# Patient Record
Sex: FEMALE | Race: WHITE | Employment: OTHER | URBAN - METROPOLITAN AREA
[De-identification: names, ages, dates, MRNs, and addresses within clinical notes are randomized per-mention and may not be internally consistent; named-entity substitution may affect disease eponyms.]

---

## 2017-01-10 ENCOUNTER — TELEPHONE (OUTPATIENT)
Dept: RHEUMATOLOGY | Age: 62
End: 2017-01-10

## 2017-01-10 RX ORDER — SODIUM CHLORIDE 9 MG/ML
25 INJECTION, SOLUTION INTRAVENOUS CONTINUOUS
Status: DISPENSED | OUTPATIENT
Start: 2017-01-13 | End: 2017-01-14

## 2017-01-10 RX ORDER — DIPHENHYDRAMINE HYDROCHLORIDE 50 MG/ML
50 INJECTION, SOLUTION INTRAMUSCULAR; INTRAVENOUS
Status: ACTIVE | OUTPATIENT
Start: 2017-01-13 | End: 2017-01-14

## 2017-01-10 RX ORDER — ACETAMINOPHEN 325 MG/1
650 TABLET ORAL
Status: ACTIVE | OUTPATIENT
Start: 2017-01-13 | End: 2017-01-14

## 2017-01-13 ENCOUNTER — HOSPITAL ENCOUNTER (OUTPATIENT)
Dept: INFUSION THERAPY | Age: 62
Discharge: HOME | End: 2017-01-13
Payer: MEDICARE

## 2017-01-17 ENCOUNTER — OFFICE VISIT (OUTPATIENT)
Dept: RHEUMATOLOGY | Age: 62
End: 2017-01-17

## 2017-01-17 VITALS
SYSTOLIC BLOOD PRESSURE: 151 MMHG | OXYGEN SATURATION: 95 % | WEIGHT: 264 LBS | BODY MASS INDEX: 45.07 KG/M2 | HEIGHT: 64 IN | HEART RATE: 99 BPM | DIASTOLIC BLOOD PRESSURE: 104 MMHG | TEMPERATURE: 98.3 F | RESPIRATION RATE: 20 BRPM

## 2017-01-17 DIAGNOSIS — E55.9 VITAMIN D DEFICIENCY: ICD-10-CM

## 2017-01-17 DIAGNOSIS — R42 DIZZINESS: ICD-10-CM

## 2017-01-17 DIAGNOSIS — M05.79 SEROPOSITIVE RHEUMATOID ARTHRITIS OF MULTIPLE SITES (HCC): Primary | ICD-10-CM

## 2017-01-17 DIAGNOSIS — F33.2 SEVERE EPISODE OF RECURRENT MAJOR DEPRESSIVE DISORDER, WITHOUT PSYCHOTIC FEATURES (HCC): ICD-10-CM

## 2017-01-17 DIAGNOSIS — R29.6 FREQUENT FALLS: ICD-10-CM

## 2017-01-17 DIAGNOSIS — Z79.899 LONG TERM CURRENT USE OF IMMUNOSUPPRESSIVE DRUG: ICD-10-CM

## 2017-01-17 DIAGNOSIS — R61 GENERALIZED HYPERHIDROSIS: ICD-10-CM

## 2017-01-17 DIAGNOSIS — E03.9 ACQUIRED HYPOTHYROIDISM: ICD-10-CM

## 2017-01-17 DIAGNOSIS — Z79.52 LONG TERM (CURRENT) USE OF SYSTEMIC STEROIDS: ICD-10-CM

## 2017-01-17 DIAGNOSIS — E78.00 HYPERCHOLESTEROLEMIA: ICD-10-CM

## 2017-01-17 DIAGNOSIS — S81.811S: ICD-10-CM

## 2017-01-17 DIAGNOSIS — E66.01 MORBID OBESITY WITH BMI OF 40.0-44.9, ADULT (HCC): ICD-10-CM

## 2017-01-17 DIAGNOSIS — M85.80 OSTEOPENIA: ICD-10-CM

## 2017-01-17 RX ORDER — METHOTREXATE 2.5 MG/1
20 TABLET ORAL
Qty: 96 TAB | Refills: 0 | Status: SHIPPED | OUTPATIENT
Start: 2017-01-21 | End: 2017-03-06 | Stop reason: SDUPTHER

## 2017-01-17 RX ORDER — FOLIC ACID 1 MG/1
1 TABLET ORAL DAILY
Qty: 90 TAB | Refills: 0 | Status: SHIPPED | OUTPATIENT
Start: 2017-01-17 | End: 2017-04-17

## 2017-01-17 NOTE — MR AVS SNAPSHOT
Visit Information Date & Time Provider Department Dept. Phone Encounter #  
 1/17/2017  1:40 PM Saige Yadav MD [facility] [id number] Upcoming Health Maintenance Date Due  
 FOOT EXAM Q1 12/29/1963 MICROALBUMIN Q1 12/29/1963 EYE EXAM RETINAL OR DILATED Q1 12/29/1963 Pneumococcal 19-64 Medium Risk (1 of 1 - PPSV23) 12/29/1972 DTaP/Tdap/Td series (1 - Tdap) 12/29/1974 FOBT Q 1 YEAR AGE 50-75 12/29/2003 BREAST CANCER SCRN MAMMOGRAM 8/19/2016 HEMOGLOBIN A1C Q6M 4/13/2017 LIPID PANEL Q1 10/13/2017 PAP AKA CERVICAL CYTOLOGY 8/14/2020 Allergies as of 1/17/2017  Review Complete On: 1/17/2017 By: Argentina Parham LPN No Known Allergies Current Immunizations  Reviewed on 12/16/2016 Name Date Influenza Vaccine 9/6/2016 Influenza Vaccine Vesna Miu) 11/19/2015  2:49 PM  
  
 Not reviewed this visit You Were Diagnosed With   
  
 Codes Comments Seropositive rheumatoid arthritis of multiple sites [facility])    -  Primary ICD-10-CM: M05.79 ICD-9-CM: 714.0 Long term (current) use of systemic steroids     ICD-10-CM: Z79.52 
ICD-9-CM: V58.65 Long term current use of immunosuppressive drug     ICD-10-CM: Z79.899 ICD-9-CM: V58.69 Hypercholesterolemia     ICD-10-CM: E78.00 ICD-9-CM: 272.0 Generalized hyperhidrosis     ICD-10-CM: R61 
ICD-9-CM: 780.8 Acquired hypothyroidism     ICD-10-CM: E03.9 ICD-9-CM: 244.9 Morbid obesity with BMI of 40.0-44.9, adult (HCC)     ICD-10-CM: E66.01, Z68.41 
ICD-9-CM: 278.01, V85.41 Osteopenia     ICD-10-CM: M85.80 ICD-9-CM: 733.90 Vitamin D deficiency     ICD-10-CM: E55.9 ICD-9-CM: 268.9 Laceration of leg, right, sequela     ICD-10-CM: S81.811S 
ICD-9-CM: 906.1 Severe episode of recurrent major depressive disorder, without psychotic features ([facility]     ICD-10-CM: F33.2 ICD-9-CM: 296.33 Dizziness     ICD-10-CM: Q66 ICD-9-CM: 780.4 Frequent falls     ICD-10-CM: R29.6 ICD-9-CM: V15.88 Vitals BP Pulse Temp Resp Height(growth percentile) Weight(growth percentile) (!) 151/104 (BP 1 Location: Left arm, BP Patient Position: Sitting) 99 98.3 °F (36.8 °C) (Oral) 20 5' 4\" (1.626 m) 264 lb (119.7 kg) SpO2 BMI OB Status Smoking Status 95% 45.32 kg/m2 Postmenopausal Former Smoker Vitals History BMI and BSA Data Body Mass Index Body Surface Area  
 45.32 kg/m 2 2.32 m 2 Preferred Pharmacy Pharmacy Name Phone [address] [phone] Your Updated Medication List  
  
   
This list is accurate as of: 1/17/17  2:10 PM.  Always use your most recent med list.  
  
  
  
  
 alendronate 70 mg tablet Commonly known as:  FOSAMAX Take 70 mg by mouth every seven (7) days. atorvastatin 40 mg tablet Commonly known as:  LIPITOR  
TAKE 1 TABLET BY MOUTH DAILY  
  
 buPROPion  mg XL tablet Commonly known as:  WELLBUTRIN XL  
TAKE 1 TABLET BY MOUTH EVERY MORNING  
  
 CALCIUM 600 + D 600-125 mg-unit Tab Generic drug:  calcium-cholecalciferol (d3) Take  by mouth daily. carvedilol 12.5 mg tablet Commonly known as:  Victoria Cerratoas Take 1 Tab by mouth two (2) times daily (with meals). Indications: Hypertension DULoxetine 40 mg Cpdr  
TK 1 C PO QD  
  
 folic acid 1 mg tablet Commonly known as:  Google Take 1 Tab by mouth daily for 90 days. gabapentin 600 mg tablet Commonly known as:  NEURONTIN Take  by mouth three (3) times daily. Not taking  
  
 levothyroxine 100 mcg tablet Commonly known as:  SYNTHROID  
TAKE 1 TABLET BY MOUTH DAILY  
  
 methotrexate 2.5 mg tablet Commonly known as:  Terrell Camilo Take 8 Tabs by mouth Every Saturday for 90 days. Start taking on:  1/21/2017  
  
 predniSONE 5 mg tablet Commonly known as:  Elly Ag  
 Take 5 tabs ( 25 mg) daily PROAIR HFA 90 mcg/actuation inhaler Generic drug:  albuterol Take 2 Puffs by inhalation every four (4) hours as needed. TOCILIZUMAB IV  
by IntraVENous route. Not taking VITAMIN D2 50,000 unit capsule Generic drug:  ergocalciferol Take 50,000 Units by mouth every seven (7) days. For 8 weeks Prescriptions Sent to Pharmacy Refills  
 methotrexate (RHEUMATREX) 2.5 mg tablet 0 Starting on: 1/21/2017 Sig: Take 8 Tabs by mouth Every Saturday for 90 days. Class: Normal  
 Pharmacy: [facility] [address] [facility] [email or web address] Ph #: [phone] Route: Oral  
 folic acid (FOLVITE) 1 mg tablet 0 Sig: Take 1 Tab by mouth daily for 90 days. Class: Normal  
 Pharmacy: [facility] [address] [facility] [email or web address] Ph #: [phone] Route: Oral  
  
We Performed the Following REFERRAL TO ENT-OTOLARYNGOLOGY [MON44 Custom] REFERRAL TO PHYSICAL THERAPY [ZOR63 Custom] Comments:  
 Evaluate and treat for frequent falls To-Do List   
 02/10/2017 1:00 PM  
  Appointment with [address] [place] [address] ([phone])  
  
 03/10/2017 1:00 PM  
  Appointment with [address] [place] [address] ([phone]) Referral Information Referral ID Referred By Referred To  
  
 [id number] [facility] [address] [place] [address] Mansoor Canas Visits Status Start Date End Date 1 New Request 1/17/17 1/17/18 If your referral has a status of pending review or denied, additional information will be sent to support the outcome of this decision. Referral ID Referred By Referred To  
 [id number] Job Jeronimo Not Available Visits Status Start Date End Date 1 New Request 1/17/17 1/17/18 If your referral has a status of pending review or denied, additional information will be sent to support the outcome of this decision. Patient Instructions Please discuss with your PCP about lacerations, frequent falls Please discuss with your therapist about your depression Please call the infusion center to schedule your Actemra infusion ([phone]) I refilled your methotrexate I gave you a referral to PT and ENT. Decrease your prednisone to 15 mg daily Introducing [facility] SERVICES! Dear Avi Rodriguez: Thank you for requesting a [employer] account. Our records indicate that you have previously registered for a [employer] account but its currently inactive. Please call our P[employer] support line at [phone]. Additional Information If you have questions, please visit the Frequently Asked Questions section of the [employer] website at [email or web address] Remember, [employer] is NOT to be used for urgent needs. For medical emergencies, dial 911. Now available from your iPhone and Android! Please provide this summary of care documentation to your next provider. Your primary care clinician is listed as [address]. If you have any questions after today's visit, please call [phone].

## 2017-01-17 NOTE — PROGRESS NOTES
Patient has fell 3 times since January 1 ,2017. Fell 2 days ago, and hurt right knee. Patient has skin tear on right knee, and bruising down right calf.

## 2017-01-17 NOTE — PATIENT INSTRUCTIONS
Please discuss with your PCP about lacerations, frequent falls     Please discuss with your therapist about your depression    Please call the infusion center to schedule your Actemra infusion ([phone])    I refilled your methotrexate    I gave you a referral to PT and ENT.     Decrease your prednisone to 15 mg daily

## 2017-01-17 NOTE — PROGRESS NOTES
REASON FOR VISIT    This is a follow-up visit for Ms. Johnson for Seropositive Rheumatoid Arthritis.     Inflammatory arthritis phenotype includes:  Anti-CCP positive: yes (73)  Rheumatoid factor positive: yes (86.2)  Erosive disease: no  Extra-articular manifestations include: none    Quantiferon TB: negative  PPD:  Not performed    Therapy History includes:    Current DMARD therapy includes: methotrexate 20 mg every Saturday, Actemra infusion (12/16/2016)   Prior DMARD therapy includes: methotrexate, Arava, Enbrel, Humira, Orencia  The following DMARDs have been ineffective: methotrexate (6 months), Onencia SQ (4 months)  The following DMARDs were stopped because of side effects: Arava (hepatotoxicity), Humira (elbow infection from MRSA s/p 3 surgeries)     Osteoporosis Historical Synopsis     Height loss since age 25 (at least two inches): 2.5  Fracture history includes: no  Family history of hip fracture: no  Fall Risk: yes     Daily calcium intake is 1,000 mg  Daily vitamin D intake is 50,000 per week     Smoking history: yes (former smoker of 22 years)  Alcohol consumption: no  Prednisone history: yes (25 mg for more than several years)     Exercise: yes     Previous work-up for osteoporosis includes the following:  DEXA Scan: 2015  Vitamin 25OH D level: None  Calcium level: 9.8 mg/dL  PTH: None     Therapy History includes:     Current osteoporosis therapy includes: alendronate 70 mg weekly  Prior osteoporosis therapy includes: none  The following osteoporosis have been ineffective: none  The following osteoporosis were stopped because of side effects: none    Immunizations:   Immunization History   Administered Date(s) Administered    Influenza Vaccine 09/06/2016    Influenza Vaccine (Quad) 11/19/2015       Active problems include:    Patient Active Problem List   Diagnosis Code    Arthritis of knee M19.90    Lumbar stenosis with neurogenic claudication M48.06    Seropositive rheumatoid arthritis of multiple sites ([facility] M05.79    Easy bruising R23.8    Acquired hypothyroidism E03.9    Postmenopausal depression F32.89    Hypercholesterolemia E78.00    Generalized hyperhidrosis R61    Prediabetes R73.03    Long term (current) use of systemic steroids Z79.52    Long term current use of immunosuppressive drug Z79.899    Essential hypertension I10    Controlled type 2 diabetes mellitus with complication, without long-term current use of insulin ([facility]) E11.8    Morbid obesity with BMI of 40.0-44.9, adult ([facility]) E66.01, Z68.41    Vitamin D deficiency E55.9    Osteopenia M85.80    Severe episode of recurrent major depressive disorder, without psychotic features ([facility]) F33.2       HISTORY OF PRESENT ILLNESS    Ms. Avani Garcia returns for a follow-up visit. On her last visit, I increased her methotrexate to 20 mg weeklySaturday with good tolerance and started her on Actemra infusions and reduced her prednisone to 20 mg. She has received 3 Actemra infusions with good tolerance to date. She has had vertigo for two months and then had bronchitis and was treated by antibiotics by urgent, so has missed her methotrexate dose for 3-4 weeks. She has fallen three times since January and has injured her legs and thumb. She injured her right knee and has a large open wound. She did not seek medical care because she is very depressed and does not know why. She has begun seeing a therapist and NP psychiatrist.     Last toxicity monitoring by blood work was done on 12/16/2016and did not reveal any significant adverse effects. Most recent inflammatory markers from 10/13/2016 revealed a ESR 27 mm/hr and CRP 3.8 mg/L. The patient has not had any interval hospital admissions or surgeries but has had infections. Iliana Gu REVIEW OF SYSTEMS    A comprehensive review of systems was performed and pertinent results are documented in the HPI, review of systems is otherwise non-contributory.     PAST MEDICAL HISTORY    She has a past medical history of Anxiety; Arrhythmia; Arthritis; Arthritis of knee (1/30/2012); Autoimmune disease ([facility] Bronchitis; Chronic pain; Depression; Dyslipidemia; Morbid obesity ([address] Nausea & vomiting; Other screening mammogram (8/19/15); Overweight(278.02); Papanicolaou smear for cervical cancer screening (8/14/15); Rheumatoid aortitis ([facility] and SVT (supraventricular tachycardia) (1993). FAMILY HISTORY    Her family history includes Breast Cancer in an other family member; Diabetes in her brother and mother; Heart Disease in her brother and father. SOCIAL HISTORY    She reports that she quit smoking about 4 years ago. She has a 10.00 pack-year smoking history. She has never used smokeless tobacco. She reports that she does not drink alcohol or use illicit drugs. MEDICATIONS    Current Outpatient Prescriptions   Medication Sig Dispense Refill    [START ON 1/21/2017] methotrexate (RHEUMATREX) 2.5 mg tablet Take 8 Tabs by mouth Every Saturday for 90 days. 96 Tab 0    folic acid (FOLVITE) 1 mg tablet Take 1 Tab by mouth daily for 90 days. 90 Tab 0    buPROPion XL (WELLBUTRIN XL) 300 mg XL tablet TAKE 1 TABLET BY MOUTH EVERY MORNING 30 Tab 3    DULoxetine 40 mg cpDR TK 1 C PO QD  1    atorvastatin (LIPITOR) 40 mg tablet TAKE 1 TABLET BY MOUTH DAILY 30 Tab 2    carvedilol (COREG) 12.5 mg tablet Take 1 Tab by mouth two (2) times daily (with meals). Indications: Hypertension 60 Tab 2    levothyroxine (SYNTHROID) 100 mcg tablet TAKE 1 TABLET BY MOUTH DAILY 30 Tab 5    predniSONE (DELTASONE) 5 mg tablet Take 5 tabs ( 25 mg) daily (Patient taking differently: Take 4 tabs ( 20 mg) daily) 140 Tab 2    PROAIR HFA 90 mcg/actuation inhaler Take 2 Puffs by inhalation every four (4) hours as needed. 2    alendronate (FOSAMAX) 70 mg tablet Take 70 mg by mouth every seven (7) days. 3    VITAMIN D2 50,000 unit capsule Take 50,000 Units by mouth every seven (7) days.  For 8 weeks  2    calcium-cholecalciferol, d3, (CALCIUM 600 + D) 600-125 mg-unit tab Take  by mouth daily.  TOCILIZUMAB IV by IntraVENous route. Not taking      gabapentin (NEURONTIN) 600 mg tablet Take  by mouth three (3) times daily. Not taking          ALLERGIES    No Known Allergies    PHYSICAL EXAMINATION    Visit Vitals    BP (!) 151/104 (BP 1 Location: Left arm, BP Patient Position: Sitting)    Pulse 99    Temp 98.3 °F (36.8 °C) (Oral)    Resp 20    Ht 5' 4\" (1.626 m)    Wt 264 lb (119.7 kg)    SpO2 95%    BMI 45.32 kg/m2     Body mass index is 45.32 kg/(m^2). General: Patient is alert, oriented x 3, not in acute distress, emotional lability    HEENT:   Sclerae are not injected and appear moist.  Oral mucous membranes are moist, there are no ulcers present. There is no alopecia. Neck is supple, there is no lymphadenopathy. Thyroid is not enlarged. Cardiovascular:  Heart is regular rate and rhythm, no murmurs. Chest:  Lungs are clear to auscultation bilaterally. No rhonchi, wheezes, or crackles. Abdomen:  Obese. Extremities:  Free of clubbing, cyanosis, edema    Neurological exam:  No focal sensory deficits, muscle strength is full in upper and lower extremities     Skin exam:  There are no alopecia, no discoid lesions, no active Raynaud's, no livedo reticularis, no periungual erythema. Ecchymosis on lower extremities, right more than left. Large right knee laceration with flap. Musculoskeletal exam:  A comprehensive musculoskeletal exam was performed for all joints of each upper and lower extremity and assessed for swelling, tenderness and range of motion.  Positive results are documented as below:     Bilateral CMC crepitus  Bilateral wrist synovitis (right more than left)  Left Elbow flexion contracture  Decreased of ROM of wrists  Right torn biceps  Bilateral MTP synovitis    Joint Count 1/17/2017 11/3/2016 10/13/2016   Patient pain (0-100) 35 80 85   MHAQ 1.5 1.38 1.63   Left elbow - Tender - - 1   Left elbow - Swollen - - 1   Left wrist- Tender - - 1   Left wrist- Swollen - - 1   Left 1st MCP - Tender - - 1   Left 1st MCP - Swollen - - 1   Left 2nd MCP - Tender - - 1   Left 2nd MCP - Swollen - - 1   Left 3rd MCP - Tender - - 1   Left 3rd MCP - Swollen - - 1   Left 4th MCP - Tender - - 1   Left 4th MCP - Swollen - - 1   Left 5th MCP - Tender - - 1   Left 5th MCP - Swollen - - 1   Left thumb IP - Tender - - 1   Left thumb IP - Swollen - - 1   Left 2nd PIP - Tender - - 1   Left 2nd PIP - Swollen - - 1   Left 3rd PIP - Tender - - 1   Left 3rd PIP - Swollen - - 1   Left 4th PIP - Tender - - 1   Left 5th PIP - Tender - - 1   Right elbow - Tender - - 1   Right elbow - Swollen - - 1   Right wrist- Tender - - 1   Right wrist- Swollen - - 1   Right 1st MCP - Tender - - 1   Right 1st MCP - Swollen - - 1   Right 2nd MCP - Tender - - 1   Right 2nd MCP - Swollen - - 1   Right 3rd MCP - Tender - - 1   Right 3rd MCP - Swollen - - 1   Right 4th MCP - Tender - - 1   Right 4th MCP - Swollen - - 1   Right 5th MCP - Tender - - 1   Right 5th MCP - Swollen - - 1   Right thumb IP - Tender - - 1   Right thumb IP - Swollen - - 1   Right 2nd PIP - Tender - - 1   Right 2nd PIP - Swollen - - 1   Right 3rd PIP - Tender - - 1   Right 3rd PIP - Swollen - - 1   Right 4th PIP - Tender - - 1   Right 4th PIP - Swollen - - 1   Right 5th PIP - Tender - - 1   Right 5th PIP - Swollen - - 1   Physician Assessment (0-10) - - 9   Patient Assessment (0-10) 6 7 9       DATA REVIEW    Laboratory     The following laboratory results were reviewed and discussed with the patient:    Hospital Outpatient Visit on 12/16/2016   Component Date Value    WBC 12/16/2016 10.0     RBC 12/16/2016 4.58     HGB 12/16/2016 14.6     HCT 12/16/2016 44.3     MCV 12/16/2016 96.7     MCH 12/16/2016 31.9     MCHC 12/16/2016 33.0     RDW 12/16/2016 15.0*    PLATELET 57/20/8094 606     NEUTROPHILS 12/16/2016 83*    LYMPHOCYTES 12/16/2016 13     MONOCYTES 12/16/2016 3*    EOSINOPHILS 12/16/2016 0     BASOPHILS 12/16/2016 0     MYELOCYTES 12/16/2016 1*    ABS. NEUTROPHILS 12/16/2016 8.3*    ABS. LYMPHOCYTES 12/16/2016 1.3     ABS. MONOCYTES 12/16/2016 0.3     ABS. EOSINOPHILS 12/16/2016 0.0     ABS. BASOPHILS 12/16/2016 0.0     RBC COMMENTS 12/16/2016                      Value:ANISOCYTOSIS  1+      WBC COMMENTS 12/16/2016 REACTIVE LYMPHS     Sodium 12/16/2016 140     Potassium 12/16/2016 4.5     Chloride 12/16/2016 103     CO2 12/16/2016 26     Anion gap 12/16/2016 11     Glucose 12/16/2016 203*    BUN 12/16/2016 22*    Creatinine 12/16/2016 0.95     BUN/Creatinine ratio 12/16/2016 23*    GFR est AA 12/16/2016 >60     GFR est non-AA 12/16/2016 60*    Calcium 12/16/2016 8.3*    Bilirubin, total 12/16/2016 0.3     ALT 12/16/2016 52     AST 12/16/2016 24     Alk. phosphatase 12/16/2016 109     Protein, total 12/16/2016 7.2     Albumin 12/16/2016 3.6     Globulin 12/16/2016 3.6     A-G Ratio 12/16/2016 1.0*   Office Visit on 11/09/2016   Component Date Value    Glucose POC 11/09/2016 198    Office Visit on 10/13/2016   Component Date Value    CCP Antibodies IgG/IgA 10/13/2016 73*    WBC 10/13/2016 10.3     RBC 10/13/2016 4.57     HGB 10/13/2016 14.1     HCT 10/13/2016 44.0     MCV 10/13/2016 96     MCH 10/13/2016 30.9     MCHC 10/13/2016 32.0     RDW 10/13/2016 14.7     PLATELET 04/39/8998 669     NEUTROPHILS 10/13/2016 82     Lymphocytes 10/13/2016 11     MONOCYTES 10/13/2016 4     EOSINOPHILS 10/13/2016 1     BASOPHILS 10/13/2016 1     ABS. NEUTROPHILS 10/13/2016 8.5*    Abs Lymphocytes 10/13/2016 1.2     ABS. MONOCYTES 10/13/2016 0.4     ABS. EOSINOPHILS 10/13/2016 0.1     ABS. BASOPHILS 10/13/2016 0.1     IMMATURE GRANULOCYTES 10/13/2016 1     ABS. IMM. GRANS.  10/13/2016 0.1     Hep B surface Ag screen 10/13/2016 Negative     Hepatitis Be Antigen 10/13/2016 Negative     Hep B Core Ab, IgM 10/13/2016 Negative     Hep B Core Ab, total 10/13/2016 Negative     Hepatitis Be Antibody 10/13/2016 Negative     HEP B SURFACE AB, QUAL 10/13/2016 Non Reactive     HCV Ab 10/13/2016 <0.1     Glucose 10/13/2016 171*    BUN 10/13/2016 16     Creatinine 10/13/2016 0.75     GFR est non-AA 10/13/2016 86     GFR est AA 10/13/2016 99     BUN/Creatinine ratio 10/13/2016 21     Sodium 10/13/2016 139     Potassium 10/13/2016 4.9     Chloride 10/13/2016 99     CO2 10/13/2016 23     Calcium 10/13/2016 9.2     Protein, total 10/13/2016 6.7     Albumin 10/13/2016 4.2     GLOBULIN, TOTAL 10/13/2016 2.5     A-G Ratio 10/13/2016 1.7     Bilirubin, total 10/13/2016 0.3     Alk. phosphatase 10/13/2016 104     AST 10/13/2016 29     ALT 10/13/2016 39*    C-Reactive Protein, Qt 10/13/2016 3.8     Sed rate (ESR) 10/13/2016 27     QuantiFERON Incubation 10/13/2016                      Value:Incubated, specimen forwarded to [facility], [place] for  completion of the assay.       Rheumatoid factor 10/13/2016 86.2*    Albumin 10/13/2016 3.8     Alpha-1-globulin 10/13/2016 0.2     Alpha-2-globulin 10/13/2016 0.7     Beta globulin 10/13/2016 1.4*    Gamma globulin 10/13/2016 0.7     M-spike 10/13/2016 Not Observed     Globulin, total 10/13/2016 2.9     A/G ratio 10/13/2016 1.3     Please note 10/13/2016 Comment     Interpretation (see belo* 10/13/2016 Comment     Specific Gravity 10/13/2016      >=1.030*    pH (UA) 10/13/2016 5.5     Color 10/13/2016 Yellow     Appearance 10/13/2016 Clear     Leukocyte Esterase 10/13/2016 Negative     Protein 10/13/2016 Negative     Glucose 10/13/2016 3+*    Ketone 10/13/2016 Negative     Blood 10/13/2016 Negative     Bilirubin 10/13/2016 Negative     Urobilinogen 10/13/2016 0.2     Nitrites 10/13/2016 Negative     Microscopic Examination 10/13/2016 Comment     Microscopic exam 10/13/2016 See additional order     URINALYSIS REFLEX 10/13/2016 Comment     Uric acid 10/13/2016 4.0     Creatinine, urine 10/13/2016 129.8     Protein total, urine 10/13/2016 10.8     Protein/Creat Ratio 10/13/2016 83     VITAMIN D, 25-HYDROXY 10/13/2016 24.1*    Antinuclear Abs, IFA 10/13/2016 Negative     Sjogren's Anti-SS-A 10/13/2016 <0.2     Sjogren's Anti-SS-B 10/13/2016 <0.2     PTH, Intact 10/13/2016 38     TSH 10/13/2016 0.884     Cholesterol, total 10/13/2016 215*    Triglyceride 10/13/2016 138     HDL Cholesterol 10/13/2016 81     VLDL, calculated 10/13/2016 28     LDL, calculated 10/13/2016 106*    Hemoglobin A1c 10/13/2016 7.0*    WBC 10/13/2016 0-5     RBC 10/13/2016 0-2     Epithelial cells 10/13/2016 0-10     Casts 10/13/2016 None seen     Mucus 10/13/2016 Present     Bacteria 10/13/2016 None seen     Comment 10/13/2016 Comment     QuantiFERON TB Gold 10/13/2016 Negative     QUANTIFERON CRITERIA 10/13/2016 Comment     QuantiFERON TB Ag Value 10/13/2016 0.04     QuantiFERON Nil Value 10/13/2016 0.06     QuantiFERON Mitogen Value 10/13/2016 7.89     QFT TB Ag minus Nil Value 10/13/2016 <0.00     Interpretation: 10/13/2016 Comment        Imaging    Musculoskeletal Ultrasound    None    Radiographs    Left Elbow 10/13/2016: mild to moderate diffuse joint space loss with moderate-sized marginal osteophytes. An elbow effusion is evident. Bones are moderately osteopenic. No acute fracture or dislocation is shown. Bilateral Hand 10/13/2016: moderate diffuse osteopenia. Bilateral carpal soft tissue swelling is shown which is greater on the right. Severe joint space loss is demonstrated in the radiocarpal joints bilaterally with scapholunate advanced collapse of the right. Right capitate abuts the distal radius. Incongruence on the left is shown between the scaphoid and lunate with mild interspace widening indicating early scapholunate advanced collapse. Small right bilateral distal radioulnar joint osteophytes are shown. A 2 to 3 mm loose body is shown on the right.  There is moderate bilateral first CMC osteoarthrosis with lateral subluxation and tiny marginal osteophytes. Mild to moderate uniform joint space narrowing is shown throughout the metacarpophalangeal joints with only minimal bilateral second and left third MCP joint osteophyte formation. Mild MCP level bilateral soft tissue swelling is present. Digital assessment shows mild joint space narrowing of the right second and third and the left third DIP joints. Tiny osteophytes are shown at these articulations as well as a small periosteal calcification along the medial aspect of the right ring finger middle phalangeal head. No definite erosions are identified nor is there soft tissue calcifications indicative of calcium pyrophosphate dihydrate deposition. Bilateral Foot 10/13/2016: chronic appearing fracture at the base of the right fifth metatarsal bone with fracture gap widening measuring up to 6 mm and mild adjacent soft tissue swelling. There is no other evidence for acute or chronic fracture or dislocation. Mild bilateral first metatarsophalangeal joint osteoarthrosis is demonstrated with tiny marginal osteophytes. Mild osseous hypertrophy of the medial first metatarsal heads is shown bilaterally with nonmarginal erosions with sclerotic margins and mild overlying soft tissue swelling. There is also mild valgus at posterior first MTP joints would lateral subluxation of the hallux sesamoid bones. Soft tissue swelling also overlies the fifth metatarsal heads bilaterally. On the right, there are nonmarginal erosions measuring up to 6 mm in size, also sharply demarcated with sclerotic margins. Mild joint space loss of the left fifth metatarsophalangeal joint is demonstrated with tiny marginal osteophytes. Several discrete nonmarginal erosions are shown medially and laterally in the third metatarsal head with mild MTP joint space loss on the right.  First through third tarsometatarsal joint space narrowing is present bilaterally with subcortical demineralization at the right third metatarsal base. Mild overlying dorsal soft tissue swelling is shown. The patient is status post resection of the left second metatarsal head. Extensive atherosclerotic calcifications are shown. Pelvic 1/07/2016: bilateral hip prostheses are partially visualized and in satisfactory alignment. There is no acute fracture. Lumbosacral fusion hardware is intact. 1/07/2016:    Lumbar 7/11/2014: recent laminectomy and instrumentation from , with placement of rods and pedicle screws. The hardware appears intact and hardware position is appropriate. Degenerative disc disease is present from L2-S1. There is grade 1 spondylolisthesis at L4-5 which is probably chronic. There is no evidence of vertebral compression fracture. CT Imaging    CTA chest with and without contrast 7/10/2014: no mediastinal, axillary or hilar lymphadenopathy. There is no pleural or pericardial effusion. The aorta is normal in course and caliber. The proximal pulmonary arteries are without evidence of filling defects, the caliber of the pulmonary arteries is also normal. The pulmonary parenchyma is unremarkable. No lytic or blastic lesions are identified. Subcutaneous edema in the soft tissues. Hepatic steatosis. No aortic aneurysm. No pulmonary embolism. The remainder of the upper abdomen visualized is unremarkable    MR Imaging    None    DXA     DXA 6/25/2015: (Excluded sites: prosthetic hips, L3 and L4 for hardware and both hips for hardware) lumbar spine L1-L2 T score -0.2 (BMD 1.150 g/cm2). ASSESSMENT AND PLAN    This is a follow-up visit for Ms. Johnson. 1) Seropositive Rheumatoid Arthritis. She was tolerating methotrexate 20 mg every Saturday but held it for about 3-4 doses due to URI. She is better now. . She missed her most recent Actemra infusion due missing her date.  She is prednisone 20 mg as the beginning on her taper I asked her decrease her prednisone to 15 mg daily and I refilled her methotrexate today. She should call and resume her Actemra infusions. 2) Long Term Use of Systemic Steroids. She is on 15 mg. I am weaning her down slowly. 3) Fibromyalgia. This was not an active issue today. 4) Long Term Use of Immunosuppressants. The patient remains on immunomodulatory medications (methotrexate, Actemra) and requires frequent toxicity monitoring by blood work.     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 She is on alendronate 70 mg weekly with good tolerance. She takes 1000 mg of calcium daily and ergocalciferol.     6) Morbid Obesity. Her BMI was 45.32  (previously 43.69, 44.46). She is actively trying to lose weight. She was been more physically active and was doing Aqua Therapy, but with her depression she has stopped.    7) Hypertension. Her blood pressure was 151/104 (previously 160/112). She is on Coreg 12.5 mg twice daily. I recommend increasing to 25 mg twice daily.    8) Vitamin D Deficiency. Her vitamin D was 24.1. She is on ergocalciferol 50,000 weekly.    9) Hypercholesterolemia. Her cholesterol was 215. She is onLipitor 80 mg. .      10) Constant Diaphoresis. Normal TSH. Not hormonal problem as per patient. This could from medications. 11) Major Depression. This is not well controlled. She follow with a therapist and an NP psychiatrist. This is not controlled. I asked her to follow up with them. 12) Vertigo with Frequent Falls. I gave her a referral to ENT, Dr. Omayra Harry, and physical therapy. 13) Right Knee Laceration. I asked her to follow up with her PCP. I personally spoke with her PCP's office and scheduled her with Javed Webb NP at 2:00pm.    15) Diabetes Mellitus Type II. Her HbA1c was 6.6%. The patient voiced understanding of the aforementioned assessment and plan. Summary of plan was provided in the After Visit Summary patient instructions.      TODAY'S ORDERS    Orders Placed This Encounter    REFERRAL TO ENT-OTOLARYNGOLOGY  REFERRAL TO PHYSICAL THERAPY    methotrexate (RHEUMATREX) 2.5 mg tablet    folic acid (FOLVITE) 1 mg tablet       Future Appointments  Date Time Provider Kenn Daphnie   1/18/2017 2:00 PM SHANIA Weir   2/10/2017 1:00 PM [place] INFUSION NURSE 2 [facility]   2/14/2017 1:40 PM Mart Hernandez MD [facility]   3/10/2017 1:00  [place] 2 [facility] Sabrina Gruber MD, [address] [facility]    Adult Rheumatology   Musculoskeletal Ultrasound Certified  [address]   [address] [facility], [address] [place] [address] Road   Phone [phone]  Fax [phone]

## 2017-01-18 ENCOUNTER — OFFICE VISIT (OUTPATIENT)
Dept: INTERNAL MEDICINE CLINIC | Age: 62
End: 2017-01-18

## 2017-01-18 VITALS
HEIGHT: 64 IN | HEART RATE: 88 BPM | WEIGHT: 263 LBS | OXYGEN SATURATION: 98 % | SYSTOLIC BLOOD PRESSURE: 134 MMHG | DIASTOLIC BLOOD PRESSURE: 94 MMHG | BODY MASS INDEX: 44.9 KG/M2 | TEMPERATURE: 97.6 F | RESPIRATION RATE: 16 BRPM

## 2017-01-18 DIAGNOSIS — M05.79 SEROPOSITIVE RHEUMATOID ARTHRITIS OF MULTIPLE SITES (HCC): ICD-10-CM

## 2017-01-18 DIAGNOSIS — S81.001A: Primary | ICD-10-CM

## 2017-01-18 DIAGNOSIS — E11.8 CONTROLLED TYPE 2 DIABETES MELLITUS WITH COMPLICATION, WITHOUT LONG-TERM CURRENT USE OF INSULIN (HCC): ICD-10-CM

## 2017-01-18 RX ORDER — IPRATROPIUM BROMIDE AND ALBUTEROL SULFATE 2.5; .5 MG/3ML; MG/3ML
SOLUTION RESPIRATORY (INHALATION)
Refills: 0 | COMMUNITY
Start: 2016-12-27 | End: 2017-06-21

## 2017-01-18 NOTE — PROGRESS NOTES
58 yo female in today primarily because of open wound of R knee. She has fallen 3 times since Jan 1st, and is under the care of Dr. Doreen Webber, Rheumatologist.  She was seen there yesterday, and referred here today because of the severity of this knee wound; current knee injury occurred 2 weeks ago  She believes her foot got hung up when she was turning away from the window at home. She fell again 3 days ago. Dr. Doreen Webber reports her depression was the reason she didn't seek care for this wound. She has had dysequilibrium and some vertigo when lying in bed. Dr. Doreen Webber has referred her to Otolaryngology for eval, and has ordered PT for balance and strength improvement. She has not seen Dr. Nel Henderson since May 2016. She did see me for DM f/u in November. She sees mental health providers. PE: Obese WF is alert   T - 97.6   BP - 134/94   Skin - RLE - bruising from just above the knee to the ankle. Overlying knee laterally and measuring 7 cm by 3.5 cm. She had TKR bilat in 2012 (Dr. Tristian Bowen)             Both UEs - scattered ecchymoses    Imp: Traumatic Wound R Knee   Rheumatoid Arthritis   Diabetes    Plan: Adaptic and 4x4s applied to knee wound and wrapped with Leonides   Refer to Wound Care   See Dr. Nel Henderson in 1 month  ______________________  Expected course of current diagnosed problem(s) as well as expected progression and possible complications, and desired follow up with provider are discussed with patient. Patient is encouraged to be back in touch with any questions or concerns. Patient expresses understanding of plan of care. Patient is given AVS which includes diagnoses, current medications, vitals.

## 2017-01-18 NOTE — PROGRESS NOTES
1. Have you been to the ER, urgent care clinic since your last visit? Hospitalized since your last visit? No    2. Have you seen or consulted any other health care providers outside of the [address] [place] [address] since your last visit? Include any pap smears or colon screening.  No  Chief Complaint   Patient presents with    Fall     3 falls in the last 3 weeks injury to right knee     Depression    Sweats

## 2017-01-18 NOTE — MR AVS SNAPSHOT
Visit Information Date & Time Provider Department Dept. Phone Encounter #  
 1/18/2017  2:00 PM [facility] [phone] [id number] Follow-up Instructions Return in about 1 month (around 2/18/2017) for Diabetes, HTN. Your Appointments 2/14/2017  1:40 PM  
ESTABLISHED PATIENT with Carolina Cortes MD  
Arthritis and [facility] ([facility]) Appt Note: 4 week f/up  
 [address] [facility] [address]  
[phone]  
  
   
 [address] [address] Upcoming Health Maintenance Date Due  
 FOOT EXAM Q1 12/29/1963 MICROALBUMIN Q1 12/29/1963 EYE EXAM RETINAL OR DILATED Q1 12/29/1963 Pneumococcal 19-64 Medium Risk (1 of 1 - PPSV23) 12/29/1972 DTaP/Tdap/Td series (1 - Tdap) 12/29/1974 FOBT Q 1 YEAR AGE 50-75 12/29/2003 BREAST CANCER SCRN MAMMOGRAM 8/19/2016 HEMOGLOBIN A1C Q6M 4/13/2017 LIPID PANEL Q1 10/13/2017 PAP AKA CERVICAL CYTOLOGY 8/14/2020 Allergies as of 1/18/2017  Review Complete On: 1/18/2017 By: Francine Obando NP No Known Allergies Current Immunizations  Reviewed on 12/16/2016 Name Date Influenza Vaccine 9/6/2016 Influenza Vaccine Sendy Buerger) 11/19/2015  2:49 PM  
  
 Not reviewed this visit You Were Diagnosed With   
  
 Codes Comments Open wound of right knee without complication, initial encounter    -  Primary ICD-10-CM: S81.001A ICD-9-CM: 891.0 Controlled type 2 diabetes mellitus with complication, without long-term current use of insulin (HCC)     ICD-10-CM: E11.8 ICD-9-CM: 250.90 Seropositive rheumatoid arthritis of multiple sites [facility])     ICD-10-CM: M05.79 ICD-9-CM: 714.0 Vitals BP Pulse Temp Resp Height(growth percentile) Weight(growth percentile) (!) 134/94 88 97.6 °F (36.4 °C) (Oral) 16 5' 4\" (1.626 m) 263 lb (119.3 kg) SpO2 BMI OB Status Smoking Status 98% 45.14 kg/m2 Postmenopausal Former Smoker Vitals History BMI and BSA Data Body Mass Index Body Surface Area  
 45.14 kg/m 2 2.32 m 2 Preferred Pharmacy Pharmacy Name Phone [address] [phone] Your Updated Medication List  
  
   
This list is accurate as of: 1/18/17  2:53 PM.  Always use your most recent med list.  
  
  
  
  
 albuterol-ipratropium 2.5 mg-0.5 mg/3 ml Nebu Commonly known as:  DUO-NEB  
every six (6) hours as needed. alendronate 70 mg tablet Commonly known as:  FOSAMAX Take 70 mg by mouth every seven (7) days. atorvastatin 40 mg tablet Commonly known as:  LIPITOR  
TAKE 1 TABLET BY MOUTH DAILY  
  
 buPROPion  mg XL tablet Commonly known as:  WELLBUTRIN XL  
TAKE 1 TABLET BY MOUTH EVERY MORNING  
  
 CALCIUM 600 + D 600-125 mg-unit Tab Generic drug:  calcium-cholecalciferol (d3) Take  by mouth daily. carvedilol 12.5 mg tablet Commonly known as:  Sherry Steinka Take 1 Tab by mouth two (2) times daily (with meals). Indications: Hypertension DULoxetine 40 mg Cpdr  
TK 1 C PO QD  
  
 folic acid 1 mg tablet Commonly known as:  Google Take 1 Tab by mouth daily for 90 days. gabapentin 600 mg tablet Commonly known as:  NEURONTIN Take  by mouth three (3) times daily. Not taking  
  
 levothyroxine 100 mcg tablet Commonly known as:  SYNTHROID  
TAKE 1 TABLET BY MOUTH DAILY  
  
 methotrexate 2.5 mg tablet Commonly known as:  Iona Ek Take 8 Tabs by mouth Every Saturday for 90 days. Start taking on:  1/21/2017  
  
 predniSONE 5 mg tablet Commonly known as:  Susen Arbour Take 5 tabs ( 25 mg) daily PROAIR HFA 90 mcg/actuation inhaler Generic drug:  albuterol Take 2 Puffs by inhalation every four (4) hours as needed.   
  
 TOCILIZUMAB IV  
 by IntraVENous route every thirty (30) days. Not taking VITAMIN D2 50,000 unit capsule Generic drug:  ergocalciferol Take 50,000 Units by mouth every seven (7) days. For 8 weeks We Performed the Following REFERRAL TO WOUND CARE [PMW538 Custom] Comments:  
 Please evaluate patient for open wound R knee; DM; Rheumatoid Arthritis. Follow-up Instructions Return in about 1 month (around 2/18/2017) for Diabetes, HTN. To-Do List   
 02/10/2017 1:00 PM  
  Appointment with [address] at [facility] ([phone])  
  
 03/10/2017 1:00 PM  
  Appointment with [address] at [facility] ([phone]) Referral Information Referral ID Referred By Referred To  
  
 [id number] [address] , [place] [address] [facility], [address] [place] [address] Street Phone: [phone] Fax: [phone] Visits Status Start Date End Date 15 New Request 1/18/17 1/18/18 If your referral has a status of pending review or denied, additional information will be sent to support the outcome of this decision. Introducing [facility] & HEALTH SERVICES! Dear Helio Hanley: Thank you for requesting a Picket account. Our records indicate that you have previously registered for a Picket account but its currently inactive. Please call our Picket support line at [phone]. Additional Information If you have questions, please visit the Frequently Asked Questions section of the Picket website at [email or web address] Remember, Picket is NOT to be used for urgent needs. For medical emergencies, dial 911. Now available from your iPhone and Android! Please provide this summary of care documentation to your next provider. Your primary care clinician is listed as Lexus Guevara. If you have any questions after today's visit, please call [phone].

## 2017-01-19 RX ORDER — DIPHENHYDRAMINE HYDROCHLORIDE 50 MG/ML
50 INJECTION, SOLUTION INTRAMUSCULAR; INTRAVENOUS
Status: ACTIVE | OUTPATIENT
Start: 2017-01-23 | End: 2017-01-24

## 2017-01-19 RX ORDER — SODIUM CHLORIDE 9 MG/ML
25 INJECTION, SOLUTION INTRAVENOUS CONTINUOUS
Status: DISPENSED | OUTPATIENT
Start: 2017-01-23 | End: 2017-01-24

## 2017-01-19 RX ORDER — ACETAMINOPHEN 325 MG/1
650 TABLET ORAL
Status: ACTIVE | OUTPATIENT
Start: 2017-01-23 | End: 2017-01-24

## 2017-01-20 ENCOUNTER — APPOINTMENT (OUTPATIENT)
Dept: INFUSION THERAPY | Age: 62
End: 2017-01-20
Payer: MEDICARE

## 2017-01-23 ENCOUNTER — HOSPITAL ENCOUNTER (OUTPATIENT)
Dept: INFUSION THERAPY | Age: 62
Discharge: HOME | End: 2017-01-23
Payer: MEDICARE

## 2017-01-23 VITALS — TEMPERATURE: 98.3 F

## 2017-01-23 PROCEDURE — 99211 OFF/OP EST MAY X REQ PHY/QHP: CPT

## 2017-01-23 RX ORDER — SODIUM CHLORIDE 0.9 % (FLUSH) 0.9 %
5-10 SYRINGE (ML) INJECTION AS NEEDED
Status: ACTIVE | OUTPATIENT
Start: 2017-01-23 | End: 2017-01-24

## 2017-01-25 ENCOUNTER — TELEPHONE (OUTPATIENT)
Dept: RHEUMATOLOGY | Age: 62
End: 2017-01-25

## 2017-01-25 NOTE — TELEPHONE ENCOUNTER
Patient left message she wanted to get her Infusion scheduled, but [facility] will not schedule due to her leg injury. Patient informed she must see her PCP first per Dr. Nettie Florence to rule out any infection. Patient states her foot is now swollen. Instructed to make appointment today with her PCP.

## 2017-01-26 ENCOUNTER — TELEPHONE (OUTPATIENT)
Dept: INTERNAL MEDICINE CLINIC | Age: 62
End: 2017-01-26

## 2017-01-26 NOTE — TELEPHONE ENCOUNTER
Patient states the wound on her leg, that she was seen for last week, and now the bruising has gone to the bottom of her foot, and she has swelling around the ankle and top of foot. She states the wound does not look infected, and it has scabbing around the wound. She also states the wound clinic would not see her unless she has had the wound for 4 weeks.

## 2017-01-26 NOTE — TELEPHONE ENCOUNTER
Patient reports the [address] advised they don't usually see/treat wounds that are less than 3weeks old, so she has not been there. Also the infusion center declined to give her the monthly Tocilizumab due to the open wound, and would like the go ahead to do this after the PCP says there is no active infection. Patient feels the wound is resolving, but is concerned about the swelling around her ankle and bruising on bottom of the foot.     Plan: OV tomorrow

## 2017-01-30 ENCOUNTER — OFFICE VISIT (OUTPATIENT)
Dept: INTERNAL MEDICINE CLINIC | Age: 62
End: 2017-01-30

## 2017-01-30 VITALS
TEMPERATURE: 98.5 F | HEIGHT: 64 IN | WEIGHT: 262 LBS | OXYGEN SATURATION: 98 % | HEART RATE: 100 BPM | DIASTOLIC BLOOD PRESSURE: 100 MMHG | BODY MASS INDEX: 44.73 KG/M2 | SYSTOLIC BLOOD PRESSURE: 122 MMHG | RESPIRATION RATE: 20 BRPM

## 2017-01-30 DIAGNOSIS — S81.801D OPEN WOUND OF LEG, RIGHT, SUBSEQUENT ENCOUNTER: Primary | ICD-10-CM

## 2017-01-30 DIAGNOSIS — M25.50 ARTHRALGIA OF MULTIPLE SITES: ICD-10-CM

## 2017-01-30 RX ORDER — CARVEDILOL 12.5 MG/1
12.5 TABLET ORAL 2 TIMES DAILY
COMMUNITY
Start: 2017-01-05 | End: 2017-02-06 | Stop reason: SDUPTHER

## 2017-01-30 RX ORDER — GABAPENTIN 300 MG/1
CAPSULE ORAL
Qty: 90 CAP | Refills: 1 | Status: SHIPPED | OUTPATIENT
Start: 2017-01-30 | End: 2017-03-30 | Stop reason: SDUPTHER

## 2017-01-30 NOTE — MR AVS SNAPSHOT
Visit Information Date & Time Provider Department Dept. Phone Encounter #  
 1/30/2017  2:00 PM SHANIA Davis [facility] [phone] Your Appointments 2/14/2017  1:40 PM  
ESTABLISHED PATIENT with Saige Yadav MD  
Arthritis and [address] ([facility]) Appt Note: 4 week f/up  
 [address] [facility] [address]  
[phone]  
  
   
 [address] [address]  
  
    
 2/24/2017 12:40 PM  
ROUTINE CARE with MD Be Poole 51 Intern[facility]) Appt Note: 1m fu for Diabetes, HTN  
 [address] , [address] [place] [address]  
[address], [facility] [address] [address] Upcoming Health Maintenance Date Due  
 FOOT EXAM Q1 12/29/1963 MICROALBUMIN Q1 12/29/1963 EYE EXAM RETINAL OR DILATED Q1 12/29/1963 Pneumococcal 19-64 Medium Risk (1 of 1 - PPSV23) 12/29/1972 DTaP/Tdap/Td series (1 - Tdap) 12/29/1974 FOBT Q 1 YEAR AGE 50-75 12/29/2003 BREAST CANCER SCRN MAMMOGRAM 8/19/2016 HEMOGLOBIN A1C Q6M 4/13/2017 LIPID PANEL Q1 10/13/2017 PAP AKA CERVICAL CYTOLOGY 8/14/2020 Allergies as of 1/30/2017  Review Complete On: 1/30/2017 By: Nicole Stack NP No Known Allergies Current Immunizations  Reviewed on 12/16/2016 Name Date Influenza Vaccine 9/6/2016 Influenza Vaccine Vesna Miu) 11/19/2015  2:49 PM  
  
 Not reviewed this visit You Were Diagnosed With   
  
 Codes Comments Open wound of leg, right, subsequent encounter    -  Primary ICD-10-CM: P64.856A ICD-9-CM: V58.89, 891.0 Arthralgia of multiple sites     ICD-10-CM: M25.50 ICD-9-CM: 719.49 Vitals BP Pulse Temp Resp Height(growth percentile) Weight(growth percentile) (!) 122/100 100 98.5 °F (36.9 °C) (Oral) 20 5' 4\" (1.626 m) 262 lb (118.8 kg) SpO2 BMI OB Status Smoking Status 98% 44.97 kg/m2 Postmenopausal Former Smoker Vitals History BMI and BSA Data Body Mass Index Body Surface Area 44.97 kg/m 2 2.32 m 2 Preferred Pharmacy Pharmacy Name Phone [address] [phone] Your Updated Medication List  
  
   
This list is accurate as of: 1/30/17  2:44 PM.  Always use your most recent med list.  
  
  
  
  
 albuterol-ipratropium 2.5 mg-0.5 mg/3 ml Nebu Commonly known as:  DUO-NEB  
every six (6) hours as needed. alendronate 70 mg tablet Commonly known as:  FOSAMAX Take 70 mg by mouth every seven (7) days. atorvastatin 40 mg tablet Commonly known as:  LIPITOR  
TAKE 1 TABLET BY MOUTH DAILY  
  
 buPROPion  mg XL tablet Commonly known as:  WELLBUTRIN XL  
TAKE 1 TABLET BY MOUTH EVERY MORNING  
  
 CALCIUM 600 + D 600-125 mg-unit Tab Generic drug:  calcium-cholecalciferol (d3) Take  by mouth daily. carvedilol 12.5 mg tablet Commonly known as:  Gaylin Hardeeville Take 12.5 mg by mouth two (2) times a day. DULoxetine 40 mg Cpdr  
TK 1 C PO QD  
  
 folic acid 1 mg tablet Commonly known as:  Google Take 1 Tab by mouth daily for 90 days. gabapentin 300 mg capsule Commonly known as:  NEURONTIN One qHS for 3 days, then may increase up to one 3 times/day  
  
 levothyroxine 100 mcg tablet Commonly known as:  SYNTHROID  
TAKE 1 TABLET BY MOUTH DAILY  
  
 methotrexate 2.5 mg tablet Commonly known as:  Domenico Jorge Alberto Take 8 Tabs by mouth Every Saturday for 90 days. predniSONE 5 mg tablet Commonly known as:  Anel Roers Take 5 tabs ( 25 mg) daily PROAIR HFA 90 mcg/actuation inhaler Generic drug:  albuterol Take 2 Puffs by inhalation every four (4) hours as needed. TOCILIZUMAB IV  
by IntraVENous route every thirty (30) days. Not taking VITAMIN D2 50,000 unit capsule Generic drug:  ergocalciferol Take 50,000 Units by mouth every seven (7) days. For 8 weeks Prescriptions Sent to Pharmacy Refills  
 gabapentin (NEURONTIN) 300 mg capsule 1 Sig: One qHS for 3 days, then may increase up to one 3 times/day Class: Normal  
 Pharmacy: [facility] [address], [address] [facility] [place]  #: [phone] Introducing [facility] SERVICES! Dear Caroline Holly: Thank you for requesting a mediafeedia account. Our records indicate that you have previously registered for a mediafeedia account but its currently inactive. Please call our mediafeedia support line at [phone]. Additional Information If you have questions, please visit the Frequently Asked Questions section of the mediafeedia website at [email or web address] Remember, mediafeedia is NOT to be used for urgent needs. For medical emergencies, dial 911. Now available from your iPhone and Android! Please provide this summary of care documentation to your next provider. Your primary care clinician is listed as Alexis Gonzales. If you have any questions after today's visit, please call [phone].

## 2017-01-31 ENCOUNTER — TELEPHONE (OUTPATIENT)
Dept: INTERNAL MEDICINE CLINIC | Age: 62
End: 2017-01-31

## 2017-01-31 NOTE — TELEPHONE ENCOUNTER
Bradley Minor MANA Johnson 1955 Phone:[phone]    Pt was seen by Wendi Pringle yesterday 1/30/17 Pt states that she has a wound on her R leg that Wendi Pringle has been monitoring. Pt states that she has an infusion done in Feb that weakens her immune system and she needs the ok to have the infusion done. Pt states that she forgot to ask Wendi Pringle yesterday. Please call pt and [facility] to give the ok.

## 2017-01-31 NOTE — TELEPHONE ENCOUNTER
Patient advised that per Alonzo Service NP the person who needs to make that judgement call is Dr Rut Zhao and expressed understanding stating that she will contact his office

## 2017-02-03 DIAGNOSIS — I10 ESSENTIAL HYPERTENSION: ICD-10-CM

## 2017-02-03 DIAGNOSIS — E78.00 HYPERCHOLESTEROLEMIA: ICD-10-CM

## 2017-02-06 RX ORDER — ATORVASTATIN CALCIUM 40 MG/1
TABLET, FILM COATED ORAL
Qty: 30 TAB | Refills: 6 | Status: SHIPPED | OUTPATIENT
Start: 2017-02-06 | End: 2017-04-07 | Stop reason: SDUPTHER

## 2017-02-06 RX ORDER — CARVEDILOL 12.5 MG/1
TABLET ORAL
Qty: 60 TAB | Refills: 0 | Status: SHIPPED | OUTPATIENT
Start: 2017-02-06 | End: 2017-04-27 | Stop reason: SDUPTHER

## 2017-02-10 ENCOUNTER — APPOINTMENT (OUTPATIENT)
Dept: INFUSION THERAPY | Age: 62
End: 2017-02-10

## 2017-02-14 RX ORDER — ACETAMINOPHEN 325 MG/1
650 TABLET ORAL
Status: ACTIVE | OUTPATIENT
Start: 2017-02-17 | End: 2017-02-18

## 2017-02-14 RX ORDER — DIPHENHYDRAMINE HYDROCHLORIDE 50 MG/ML
50 INJECTION, SOLUTION INTRAMUSCULAR; INTRAVENOUS
Status: ACTIVE | OUTPATIENT
Start: 2017-02-17 | End: 2017-02-18

## 2017-02-14 RX ORDER — SODIUM CHLORIDE 9 MG/ML
25 INJECTION, SOLUTION INTRAVENOUS CONTINUOUS
Status: DISPENSED | OUTPATIENT
Start: 2017-02-17 | End: 2017-02-18

## 2017-02-17 ENCOUNTER — HOSPITAL ENCOUNTER (OUTPATIENT)
Dept: INFUSION THERAPY | Age: 62
Discharge: HOME | End: 2017-02-17

## 2017-02-20 ENCOUNTER — APPOINTMENT (OUTPATIENT)
Dept: INFUSION THERAPY | Age: 62
End: 2017-02-20

## 2017-02-24 ENCOUNTER — OFFICE VISIT (OUTPATIENT)
Dept: INTERNAL MEDICINE CLINIC | Age: 62
End: 2017-02-24

## 2017-02-24 VITALS
HEIGHT: 64 IN | HEART RATE: 102 BPM | SYSTOLIC BLOOD PRESSURE: 134 MMHG | DIASTOLIC BLOOD PRESSURE: 90 MMHG | RESPIRATION RATE: 20 BRPM | OXYGEN SATURATION: 97 % | BODY MASS INDEX: 45.07 KG/M2 | WEIGHT: 264 LBS | TEMPERATURE: 98.3 F

## 2017-02-24 DIAGNOSIS — F33.2 SEVERE EPISODE OF RECURRENT MAJOR DEPRESSIVE DISORDER, WITHOUT PSYCHOTIC FEATURES (HCC): ICD-10-CM

## 2017-02-24 DIAGNOSIS — I10 ESSENTIAL HYPERTENSION: ICD-10-CM

## 2017-02-24 DIAGNOSIS — E78.00 HYPERCHOLESTEROLEMIA: ICD-10-CM

## 2017-02-24 DIAGNOSIS — E09.9 STEROID-INDUCED DIABETES (HCC): Primary | ICD-10-CM

## 2017-02-24 DIAGNOSIS — E66.01 MORBID OBESITY WITH BMI OF 45.0-49.9, ADULT (HCC): ICD-10-CM

## 2017-02-24 DIAGNOSIS — T38.0X5A STEROID-INDUCED DIABETES (HCC): Primary | ICD-10-CM

## 2017-02-24 DIAGNOSIS — Z12.31 ENCOUNTER FOR SCREENING MAMMOGRAM FOR MALIGNANT NEOPLASM OF BREAST: ICD-10-CM

## 2017-02-24 DIAGNOSIS — E03.9 ACQUIRED HYPOTHYROIDISM: ICD-10-CM

## 2017-02-24 RX ORDER — VALSARTAN 80 MG/1
80 TABLET ORAL DAILY
Qty: 30 TAB | Refills: 2 | Status: SHIPPED | OUTPATIENT
Start: 2017-02-24 | End: 2017-05-30 | Stop reason: SDUPTHER

## 2017-02-24 NOTE — MR AVS SNAPSHOT
Visit Information Date & Time Provider Department Dept. Phone Encounter #  
 2/24/2017 12:40 PM MD [address] [facility] [phone] Follow-up Instructions Return in about 3 months (around 5/24/2017) for Diabetes - establish with new provider . Upcoming Health Maintenance Date Due  
 FOOT EXAM Q1 12/29/1963 MICROALBUMIN Q1 12/29/1963 EYE EXAM RETINAL OR DILATED Q1 12/29/1963 Pneumococcal 19-64 Medium Risk (1 of 1 - PPSV23) 12/29/1972 DTaP/Tdap/Td series (1 - Tdap) 12/29/1974 FOBT Q 1 YEAR AGE 50-75 12/29/2003 BREAST CANCER SCRN MAMMOGRAM 8/19/2016 HEMOGLOBIN A1C Q6M 4/13/2017 LIPID PANEL Q1 10/13/2017 PAP AKA CERVICAL CYTOLOGY 8/14/2020 Allergies as of 2/24/2017  Review Complete On: 2/24/2017 By: Ai Hardwick LPN No Known Allergies Current Immunizations  Reviewed on 2/24/2017 Name Date Influenza Vaccine 9/6/2016 Influenza Vaccine [place] Jignesh) 11/19/2015  2:49 PM  
  
 Reviewed by Arleth Ordoñez MD on 2/24/2017 at  1:31 PM  
 Reviewed by Arleth Ordoñez MD on 2/24/2017 at  1:31 PM  
 Reviewed by Arleth Ordoñez MD on 2/24/2017 at  1:33 PM  
You Were Diagnosed With   
  
 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 
ICD-9-CM: 249.00, E980.4 Morbid obesity with BMI of 45.0-49.9, adult (HCC)     ICD-10-CM: E66.01, Z68.42 
ICD-9-CM: 278.01, V85.42 Hypercholesterolemia     ICD-10-CM: E78.00 ICD-9-CM: 272.0 Essential hypertension     ICD-10-CM: I10 
ICD-9-CM: 401.9 Severe episode of recurrent major depressive disorder, without psychotic features ([facility]     ICD-10-CM: F33.2 ICD-9-CM: 296.33 Acquired hypothyroidism     ICD-10-CM: E03.9 ICD-9-CM: 244.9 Encounter for screening mammogram for malignant neoplasm of breast     ICD-10-CM: Z12.31 
ICD-9-CM: V76.12 Vitals  BP  
  
  
  
  
  
 134/90 (BP 1 Location: Left arm, BP Patient Position: Sitting) Vitals History BMI and BSA Data Body Mass Index Body Surface Area  
 45.32 kg/m 2 2.32 m 2 Preferred Pharmacy Pharmacy Name Phone [address] [phone] Your Updated Medication List  
  
   
This list is accurate as of: 2/24/17  1:37 PM.  Always use your most recent med list.  
  
  
  
  
 albuterol-ipratropium 2.5 mg-0.5 mg/3 ml Nebu Commonly known as:  DUO-NEB  
every six (6) hours as needed. alendronate 70 mg tablet Commonly known as:  FOSAMAX Take 70 mg by mouth every seven (7) days. atorvastatin 40 mg tablet Commonly known as:  LIPITOR  
TAKE 1 TABLET BY MOUTH DAILY  
  
 buPROPion  mg XL tablet Commonly known as:  WELLBUTRIN XL  
TAKE 1 TABLET BY MOUTH EVERY MORNING  
  
 CALCIUM 600 + D 600-125 mg-unit Tab Generic drug:  calcium-cholecalciferol (d3) Take  by mouth daily. carvedilol 12.5 mg tablet Commonly known as:  COREG  
TAKE 1 TABLET BY MOUTH TWICE DAILY WITH MEALS FOR HIGH BLOOD PRESSURE DULoxetine 40 mg Cpdr  
TK 1 C PO QD  
  
 folic acid 1 mg tablet Commonly known as:  Google Take 1 Tab by mouth daily for 90 days. gabapentin 300 mg capsule Commonly known as:  NEURONTIN One qHS for 3 days, then may increase up to one 3 times/day  
  
 levothyroxine 100 mcg tablet Commonly known as:  SYNTHROID  
TAKE 1 TABLET BY MOUTH DAILY  
  
 methotrexate 2.5 mg tablet Commonly known as:  Lavona Shaniqua Take 8 Tabs by mouth Every Saturday for 90 days. predniSONE 5 mg tablet Commonly known as:  Darylace Earnest Take 5 tabs ( 25 mg) daily PROAIR HFA 90 mcg/actuation inhaler Generic drug:  albuterol Take 2 Puffs by inhalation every four (4) hours as needed. TOCILIZUMAB IV  
by IntraVENous route every thirty (30) days. Not taking valsartan 80 mg tablet Commonly known as:  DIOVAN Take 1 Tab by mouth daily. VITAMIN D2 50,000 unit capsule Generic drug:  ergocalciferol Take 50,000 Units by mouth every seven (7) days. For 8 weeks Prescriptions Sent to Pharmacy Refills  
 valsartan (DIOVAN) 80 mg tablet 2 Sig: Take 1 Tab by mouth daily. Class: Normal  
 Pharm[facility]: [facility] [address] [facility] [place] Ph #: [phone] Route: Oral  
  
We Performed the Following CBC W/O DIFF [26887 CPT(R)] HEMOGLOBIN A1C WITH EAG [91628 CPT(R)] LIPID PANEL [42823 CPT(R)] METABOLIC PANEL, COMPREHENSIVE [95914 CPT(R)] MICROALBUMIN, UR, RAND W/ MICROALBUMIN/CREA RATIO [id number] CPT(R)] Follow-up Instructions Return in about 3 months (around 5/24/2017) for Diabetes - establish with new provider . To-Do List   
 02/27/2017 Imaging:  [facility] MAMMO BI SCREENING INCL CAD Patient Instructions WEIGHT LOSS RECOMMENDATIONS: 
 
Lee Stallings Medically Supervised Weight Loss Program: - Multidisciplinary & holistic approach to your weight loss - [phone] Jose Rafael Pérez:  
            - [address] [facility]. [place], [place] 
 - [phone] 
 - [email or web address] 
 - 3 month initial course - Initial fee + monthly membership fee [facility] - Dr Pranav Bauer 
 - [facility]. [place], [place] 
 - [phone] - [email or web address] 
  
ACAC PREP Program (Physician Recommend Exercise Program 
 - $60 for 60 days [facility] - Patient Engagement Workshop: Healthy Lifestyle 
 -Run by Dr Steven Ross at [facility] Internal Medicine 
 -Once a month sessions, 10-12 patients -FREE Weight Watchers: 
 - See website Melodie Velez: - See website New Technology: - My Fitness Pal or other Apps for your phone - FitBit or FuelBand Medications: - Contrave - Qsymia - Belviq 
            - Saxenda Aerobic exercise: goal of 3-5 times per week, about 30 minutes Diet changes: limiting daily calorie intake to 2,000. Work on reading nutrition labels on food (in particular the serving size, the calories per serving, and carbohydrates). Work on decreasing portion sizes & snacking. Introducing [facility] & HEALTH SERVICES! Dear Fernando Solorio: Thank you for requesting a Doocuments account. Our records indicate that you have previously registered for a Doocuments account but its currently inactive. Please call our Doocuments support line at [phone]. Additional Information If you have questions, please visit the Frequently Asked Questions section of the Doocuments website at [email or web address] Remember, Doocuments is NOT to be used for urgent needs. For medical emergencies, dial 911. Now available from your iPhone and Android! Please provide this summary of care documentation to your next provider. Your primary care clinician is listed as Laura Arredondo. If you have any questions after today's visit, please call [phone].

## 2017-02-24 NOTE — PROGRESS NOTES
Chief Complaint   Patient presents with    Diabetes     pt here for follow up    Hypertension     pt here for follow up    Knee Pain     pt fell 2 months ago still having pain in her right knee

## 2017-02-24 NOTE — PATIENT INSTRUCTIONS
WEIGHT LOSS RECOMMENDATIONS:    Beck Osborne Medically Supervised Weight Loss Program:   - Multidisciplinary & holistic approach to your weight loss   - [phone]    [place]:               - [address] [facility]. [place], [place]   - [phone]   - [email or web address]   - 3 month initial course   - Initial fee + monthly membership fee    [place] [facility] Doris Weems[facility]   - Dr Shantia Kaufman   - Lily Vasquez. [place], [place]   - [phone]   - [email or web address]     ACAC PREP Program (Physician Recommend Exercise Program   - $60 for 60 days    [facility] Internal Medicine - Patient Engagement Workshop: Healthy Lifestyle   -Run by Dr Tiago Samuels at [address] [address] Internal Medicine   -Once a month sessions, 10-12 patients   -FREE    Weight Watchers:   - See website    Tamir Puckett:   - See website    New Technology:   - My Ria Sings or other Apps for your phone   - FitBit or FuelBand    Medications:   - Contrave    - Qsymia   - Belviq              - Saxenda      Aerobic exercise: goal of 3-5 times per week, about 30 minutes    Diet changes: limiting daily calorie intake to 2,000. Work on reading nutrition labels on food (in particular the serving size, the calories per serving, and carbohydrates). Work on decreasing portion sizes & snacking.

## 2017-02-24 NOTE — PROGRESS NOTES
HPI:  Gloria Abrams is a 59y.o. year old female who returns to clinic today for routine follow up appointment to discuss the issues below:    She is here for f/u of diabetes. She is currently not on medication for diabetes. Has not had an eye exam.  Has dry eyes. She fell three times in Jan landing on her right knee - she had a significant open wound over her patella, prior knee replacement. She reports marked improvement over the past month. Had been referred to wound care but they declined to see her as the wound hadn't been given a chance yet to heal (they typically require at least 4 weeks prior to evaluation). She is now leaving the wound open. She denies fevers, chills. Her knee is chronically numb after the replacement so she doesn't have any pain. She has full movement of the knee joint. HTN- she has been taking 25 mg of carvedilol twice a day. This is her only hypertensive. There is some confusion as she thought it should be 12.5 mg bid, which in fact is what her chart shows. She is on chronic prednisone for RA. She is down to 15 mg daily now after being on 25 mg for several years. Follows with Dr. iGorgi Blevins. She has a lot of pain but pushes through. She is on a DMARD but missed last infusion due to the knee skin lesion. Weight is up 30 lbs in one year. She admits to stress eating. 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 She is on disability for the RA. Admits to feeling depressed and not wanting to get out of bed. No thoughts of self harm. Her sister has cancer (now in remission) and this has been hard on her. Breakfast - 2 eggs, 2 slices toast (low carb) and fruit  Lunch - skips some days  Dinner - grilled chicken, baked potato, broccoli  Drinks - water with no calorie flavor packs  Nighttime snacking - cookies, cakes, donuts - knows she overdoes it at night    She reports fatigue all day long. Sleeps in 2 hr bouts. She snores. [facility]. Hypothyroid - adherent to levothyroxine daily on empty stomach. TSH wnl in Oct.     Lab Results  Component Value Date/Time   TSH 0.884 10/13/2016 10:13 AM        Lab Results  Component Value Date/Time   Hemoglobin A1c 7.0 10/13/2016 10:13 AM   Hemoglobin A1c 6.6 05/05/2016 10:33 AM   Hemoglobin A1c 6.4 06/30/2014 02:45 PM   Glucose 203 12/16/2016 01:20 PM   Glucose  11/09/2016 11:39 AM   LDL, calculated 106 10/13/2016 10:14 AM   Creatinine 0.95 12/16/2016 01:20 PM              Prior to Admission medications    Medication Sig Start Date End Date Taking? Authorizing Provider   atorvastatin (LIPITOR) 40 mg tablet TAKE 1 TABLET BY MOUTH DAILY 2/6/17  Yes Sherrill Hardy MD   carvedilol (COREG) 12.5 mg tablet TAKE 1 TABLET BY MOUTH TWICE DAILY WITH MEALS FOR HIGH BLOOD PRESSURE  Patient taking differently: TAKE 25mg TABLET BY MOUTH TWICE DAILY WITH MEALS FOR HIGH BLOOD PRESSURE 2/6/17  Yes Sherrill Hardy MD   gabapentin (NEURONTIN) 300 mg capsule One qHS for 3 days, then may increase up to one 3 times/day 1/30/17  Yes Frederic Kaba NP   albuterol-ipratropium (DUO-NEB) 2.5 mg-0.5 mg/3 ml nebu every six (6) hours as needed. 12/27/16  Yes Historical Provider   methotrexate (RHEUMATREX) 2.5 mg tablet Take 8 Tabs by mouth Every Saturday for 90 days. 1/21/17 4/21/17 Yes Fermín Banuelos MD   folic acid (FOLVITE) 1 mg tablet Take 1 Tab by mouth daily for 90 days. 1/17/17 4/17/17 Yes Fermín Banuelos MD   TOCILIZUMAB IV by IntraVENous route every thirty (30) days.  Not taking   Yes Historical Provider   buPROPion XL (WELLBUTRIN XL) 300 mg XL tablet TAKE 1 TABLET BY MOUTH EVERY MORNING 11/14/16  Yes Sherrill Hardy MD   DULoxetine 40 mg cpDR TK 1 C PO QD 10/17/16  Yes Historical Provider   levothyroxine (SYNTHROID) 100 mcg tablet TAKE 1 TABLET BY MOUTH DAILY 10/29/16  Yes Sherrill Hardy MD   predniSONE (DELTASONE) 5 mg tablet Take 5 tabs ( 25 mg) daily  Patient taking differently: 15 mg. Take 4 tabs ( 20 mg) daily 10/21/16  Yes Yovany Coombs MD   alendronate (FOSAMAX) 70 mg tablet Take 70 mg by mouth every seven (7) days. 11/3/15  Yes Historical Provider   VITAMIN D2 50,000 unit capsule Take 50,000 Units by mouth every seven (7) days. For 8 weeks 11/7/15  Yes Historical Provider   calcium-cholecalciferol, d3, (CALCIUM 600 + D) 600-125 mg-unit tab Take  by mouth daily. Yes Historical Provider   PROAIR HFA 90 mcg/actuation inhaler Take 2 Puffs by inhalation every four (4) hours as needed. 5/17/16   Historical Provider          No Known Allergies        Review of Systems   Constitutional: Positive for diaphoresis (chronic) and malaise/fatigue. Negative for chills and fever. HENT: Negative for congestion. Respiratory: Positive for shortness of breath. Negative for cough and wheezing. Cardiovascular: Negative for chest pain, palpitations and leg swelling. Gastrointestinal: Negative for abdominal pain, blood in stool and heartburn. Musculoskeletal: Positive for joint pain. Negative for falls and myalgias. Neurological: Negative for dizziness and headaches. Physical Exam   Constitutional: She appears well-nourished. Obese   Neck: Carotid bruit is not present. Cardiovascular: Normal rate, regular rhythm and normal heart sounds. No murmur heard. Pulses:       Carotid pulses are 2+ on the right side, and 2+ on the left side. Pulmonary/Chest: Effort normal and breath sounds normal.   Abdominal: Soft. Bowel sounds are normal. There is no hepatosplenomegaly. There is no tenderness. Musculoskeletal: She exhibits no edema. Psychiatric: She has a normal mood and affect.  Her behavior is normal.         Visit Vitals    /90 (BP 1 Location: Left arm, BP Patient Position: Sitting)    Pulse (!) 102    Temp 98.3 °F (36.8 °C) (Oral)    Resp 20    Ht 5' 4\" (1.626 m)    Wt 264 lb (119.7 kg)    SpO2 97%    BMI 45.32 kg/m2         Assessment & Plan:  Enoc Unger was seen today for diabetes, hypertension and knee pain. Diagnoses and all orders for this visit:    Steroid-induced diabetes ([facility] [address]  A1c was rising last Oct.  I suspect she will need to start an oral hypoglycemic. Discussed Metformin initiation during office visit. Will wait on prescription pending lab results. -     LIPID PANEL  -     HEMOGLOBIN A1C WITH EAG  -     CBC W/O DIFF  -     METABOLIC PANEL, COMPREHENSIVE  -     MICROALBUMIN, UR, RAND W/ MICROALBUMIN/CREA RATIO    Morbid obesity with BMI of 45.0-49.9, adult ([facility])  Counseled at length. Obesity is related to her psychiatric condition in addition to chronic steroid use. I encouraged her to get more active by involving herself in her community which will help to lift her depression which leads to nighttime bingeing. Consider OA - we didn't discuss this at today's visit. Hypercholesterolemia  I evaluated and recommended to continue current doses of medications pending lab work. -     LIPID PANEL    Essential hypertension  bp not at goal  Add valsartan   Continue carvedilol at 25 mg bid  -     valsartan (DIOVAN) 80 mg tablet; Take 1 Tab by mouth daily. Severe episode of recurrent major depressive disorder, without psychotic features ([facility]  As above - recommend more involvement in her community and getting out of her home. Continue medication management per psychiatry  I encouraged her to be more open with her psychiatric providers about how she is doing    Acquired hypothyroidism  I evaluated and recommended to continue current doses of medications.      Encounter for screening mammogram for malignant neoplasm of breast   -     [facility] BI SCREENING INCL CAD; Future         Follow-up Disposition:  Return in about 3 months (around 5/24/2017) for Diabetes - establish with new provider .    Advised her to call back or return to office if symptoms worsen/change/persist.  Discussed expected course/resolution/complications of diagnosis in detail with patient. Medication risks/benefits/costs/interactions/alternatives discussed with patient. She was given an after visit summary which includes diagnoses, current medications, & vitals. She expressed understanding with the diagnosis and plan.

## 2017-03-06 ENCOUNTER — OFFICE VISIT (OUTPATIENT)
Dept: RHEUMATOLOGY | Age: 62
End: 2017-03-06

## 2017-03-06 VITALS
RESPIRATION RATE: 18 BRPM | BODY MASS INDEX: 45.93 KG/M2 | HEART RATE: 104 BPM | SYSTOLIC BLOOD PRESSURE: 149 MMHG | DIASTOLIC BLOOD PRESSURE: 89 MMHG | TEMPERATURE: 98 F | OXYGEN SATURATION: 95 % | WEIGHT: 269 LBS | HEIGHT: 64 IN

## 2017-03-06 DIAGNOSIS — Z79.52 LONG TERM (CURRENT) USE OF SYSTEMIC STEROIDS: ICD-10-CM

## 2017-03-06 DIAGNOSIS — I10 ESSENTIAL HYPERTENSION: ICD-10-CM

## 2017-03-06 DIAGNOSIS — E03.9 ACQUIRED HYPOTHYROIDISM: ICD-10-CM

## 2017-03-06 DIAGNOSIS — F33.2 SEVERE EPISODE OF RECURRENT MAJOR DEPRESSIVE DISORDER, WITHOUT PSYCHOTIC FEATURES (HCC): ICD-10-CM

## 2017-03-06 DIAGNOSIS — E11.8 CONTROLLED TYPE 2 DIABETES MELLITUS WITH COMPLICATION, WITHOUT LONG-TERM CURRENT USE OF INSULIN (HCC): ICD-10-CM

## 2017-03-06 DIAGNOSIS — E66.01 MORBID OBESITY WITH BMI OF 45.0-49.9, ADULT (HCC): ICD-10-CM

## 2017-03-06 DIAGNOSIS — E55.9 VITAMIN D DEFICIENCY: ICD-10-CM

## 2017-03-06 DIAGNOSIS — Z79.899 LONG TERM CURRENT USE OF IMMUNOSUPPRESSIVE DRUG: ICD-10-CM

## 2017-03-06 DIAGNOSIS — M06.9 FLARE OF RHEUMATOID ARTHRITIS (HCC): ICD-10-CM

## 2017-03-06 DIAGNOSIS — R61 GENERALIZED HYPERHIDROSIS: ICD-10-CM

## 2017-03-06 DIAGNOSIS — M05.79 SEROPOSITIVE RHEUMATOID ARTHRITIS OF MULTIPLE SITES (HCC): Primary | ICD-10-CM

## 2017-03-06 DIAGNOSIS — E78.00 HYPERCHOLESTEROLEMIA: ICD-10-CM

## 2017-03-06 RX ORDER — METHOTREXATE 2.5 MG/1
20 TABLET ORAL
Qty: 96 TAB | Refills: 0 | Status: SHIPPED | OUTPATIENT
Start: 2017-03-11 | End: 2017-06-09

## 2017-03-06 RX ORDER — PREDNISONE 5 MG/1
5 TABLET ORAL DAILY
Qty: 90 TAB | Refills: 0 | Status: SHIPPED | OUTPATIENT
Start: 2017-03-06 | End: 2017-04-07 | Stop reason: SDUPTHER

## 2017-03-06 NOTE — PROGRESS NOTES
REASON FOR VISIT    This is a follow-up visit for Ms. Johnson for Seropositive Rheumatoid Arthritis.     Inflammatory arthritis phenotype includes:  Anti-CCP positive: yes (73)  Rheumatoid factor positive: yes (86.2)  Erosive disease: no  Extra-articular manifestations include: none    Quantiferon TB: negative  PPD:  Not performed    Therapy History includes:    Current DMARD therapy includes: methotrexate 20 mg every Saturday, Actemra infusion (12/16/2016 on hold)   Prior DMARD therapy includes: methotrexate, Arava, Enbrel, Humira, Orencia  The following DMARDs have been ineffective: methotrexate (6 months), Onencia SQ (4 months)  The following DMARDs were stopped because of side effects: Arava (hepatotoxicity), Humira (elbow infection from MRSA s/p 3 surgeries)     Osteoporosis Historical Synopsis     Height loss since age 25 (at least two inches): 2.5  Fracture history includes: no  Family history of hip fracture: no  Fall Risk: yes     Daily calcium intake is 1,000 mg  Daily vitamin D intake is 50,000 per week     Smoking history: yes (former smoker of 22 years)  Alcohol consumption: no  Prednisone history: yes (25 mg for more than several years)     Exercise: yes     Previous work-up for osteoporosis includes the following:  DEXA Scan: 2015  Vitamin 25OH D level: None  Calcium level: 9.8 mg/dL  PTH: None     Therapy History includes:     Current osteoporosis therapy includes: alendronate 70 mg weekly  Prior osteoporosis therapy includes: none  The following osteoporosis have been ineffective: none  The following osteoporosis were stopped because of side effects: none    Immunizations:   Immunization History   Administered Date(s) Administered    Influenza Vaccine 09/06/2016    Influenza Vaccine (Quad) 11/19/2015       Active problems include:    Patient Active Problem List   Diagnosis Code    Arthritis of knee M19.90    Lumbar stenosis with neurogenic claudication M48.06    Seropositive rheumatoid arthritis of multiple sites ([facility] [address] M05.79    Easy bruising R23.8    Acquired hypothyroidism E03.9    Postmenopausal depression F32.89    Hypercholesterolemia E78.00    Generalized hyperhidrosis R61    Long term (current) use of systemic steroids Z79.52    Long term current use of immunosuppressive drug Z79.899    Essential hypertension I10    Controlled type 2 diabetes mellitus with complication, without long-term current use of insulin ([facility]) E11.8    Morbid obesity with BMI of 45.0-49.9, adult ([facility]) E66.01, Z68.42    Vitamin D deficiency E55.9    Osteopenia M85.80    Severe episode of recurrent major depressive disorder, without psychotic features ([facility]) F33.2       HISTORY OF PRESENT ILLNESS    Ms. Ronda Bearden returns for a follow-up visit. On her last visit, I continued her methotrexate to 20 mg weekly Saturday with discussed with her PCP about her wound. She was referred to wound care but was declined due to time of wound. She has not received her infusion since 12/2016. Her last methotrexate was this past Saturday. He is on prednisone 5 mg today. She feels pain, stifness and swelling. She has stiffness that lasts for hours. Last toxicity monitoring by blood work was done on 12/16/2016 and did not reveal any significant adverse effects. Most recent inflammatory markers from 10/13/2016 revealed a ESR 27 mm/hr and CRP 3.8 mg/L. The patient has not had any interval hospital admissions or surgeries but has had infections. Candance Huger REVIEW OF SYSTEMS    A comprehensive review of systems was performed and pertinent results are documented in the HPI, review of systems is otherwise non-contributory. PAST MEDICAL HISTORY    She has a past medical history of Anxiety; Arrhythmia; Arthritis; Arthritis of knee (1/30/2012); Autoimmune disease ([facility] [address] Bronchitis; Chronic pain; Depression; Dyslipidemia; Morbid obesity ([facility] [address] Nausea & vomiting; Other screening mammogram (8/19/15);  Overweight(278.02); Papanicolaou smear for cervical cancer screening (8/14/15); Rheumatoid aortitis ([facility] [address] and SVT (supraventricular tachycardia) (1993). FAMILY HISTORY    Her family history includes Breast Cancer in an other family member; Diabetes in her brother and mother; Heart Disease in her brother and father. SOCIAL HISTORY    She reports that she quit smoking about 4 years ago. She has a 10.00 pack-year smoking history. She has never used smokeless tobacco. She reports that she does not drink alcohol or use illicit drugs. MEDICATIONS    Current Outpatient Prescriptions   Medication Sig Dispense Refill    [START ON 3/11/2017] methotrexate (RHEUMATREX) 2.5 mg tablet Take 8 Tabs by mouth Every Saturday for 90 days. 96 Tab 0    predniSONE (DELTASONE) 5 mg tablet Take 1 Tab by mouth daily for 90 days. 90 Tab 0    valsartan (DIOVAN) 80 mg tablet Take 1 Tab by mouth daily. 30 Tab 2    atorvastatin (LIPITOR) 40 mg tablet TAKE 1 TABLET BY MOUTH DAILY 30 Tab 6    carvedilol (COREG) 12.5 mg tablet TAKE 1 TABLET BY MOUTH TWICE DAILY WITH MEALS FOR HIGH BLOOD PRESSURE (Patient taking differently: TAKE 25mg TABLET BY MOUTH TWICE DAILY WITH MEALS FOR HIGH BLOOD PRESSURE) 60 Tab 0    gabapentin (NEURONTIN) 300 mg capsule One qHS for 3 days, then may increase up to one 3 times/day 90 Cap 1    albuterol-ipratropium (DUO-NEB) 2.5 mg-0.5 mg/3 ml nebu every six (6) hours as needed. 0    folic acid (FOLVITE) 1 mg tablet Take 1 Tab by mouth daily for 90 days. 90 Tab 0    buPROPion XL (WELLBUTRIN XL) 300 mg XL tablet TAKE 1 TABLET BY MOUTH EVERY MORNING 30 Tab 3    DULoxetine 40 mg cpDR TK 1 C PO QD  1    levothyroxine (SYNTHROID) 100 mcg tablet TAKE 1 TABLET BY MOUTH DAILY 30 Tab 5    PROAIR HFA 90 mcg/actuation inhaler Take 2 Puffs by inhalation every four (4) hours as needed. 2    alendronate (FOSAMAX) 70 mg tablet Take 70 mg by mouth every seven (7) days.   3    VITAMIN D2 50,000 unit capsule Take 50,000 Units by mouth every seven (7) days. For 8 weeks  2    calcium-cholecalciferol, d3, (CALCIUM 600 + D) 600-125 mg-unit tab Take  by mouth daily.  TOCILIZUMAB IV by IntraVENous route every thirty (30) days. Not taking          ALLERGIES    No Known Allergies    PHYSICAL EXAMINATION    Visit Vitals    /89 (BP 1 Location: Left arm, BP Patient Position: Sitting)    Pulse (!) 104    Temp 98 °F (36.7 °C)    Resp 18    Ht 5' 4\" (1.626 m)    Wt 269 lb (122 kg)    SpO2 95%    BMI 46.17 kg/m2     Body mass index is 46.17 kg/(m^2). General: Patient is alert, oriented x 3, not in acute distress. HEENT:   Sclerae are not injected and appear moist.  Oral mucous membranes are moist, there are no ulcers present. There is no alopecia. Neck is supple. Cardiovascular:  Heart is regular rate and rhythm, no murmurs. Chest:  Lungs are clear to auscultation bilaterally. No rhonchi, wheezes, or crackles. Abdomen:  Obese. Extremities:  Free of clubbing, cyanosis, edema    Neurological exam:  No focal sensory deficits, muscle strength is full in upper and lower extremities     Skin exam:  There are no alopecia, no discoid lesions, no active Raynaud's, no livedo reticularis, no periungual erythema. Large right knee laceration with flap marked improved    Musculoskeletal exam:  A comprehensive musculoskeletal exam was performed for all joints of each upper and lower extremity and assessed for swelling, tenderness and range of motion.  Positive results are documented as below:    Bilateral CMC crepitus  Bilateral wrist synovitis (right more than left)  Left Elbow flexion contracture  Decreased of ROM of wrists  Right torn biceps    Joint Count 3/6/2017 1/17/2017 11/3/2016 10/13/2016   Patient pain (0-100) 70 35 80 85   MHAQ 1.5 1.5 1.38 1.63   Left elbow - Tender - - - 1   Left elbow - Swollen - - - 1   Left wrist- Tender - - - 1   Left wrist- Swollen - - - 1   Left 1st MCP - Tender - - - 1   Left 1st MCP - Swollen - - - 1   Left 2nd MCP - Tender - - - 1   Left 2nd MCP - Swollen - - - 1   Left 3rd MCP - Tender - - - 1   Left 3rd MCP - Swollen - - - 1   Left 4th MCP - Tender - - - 1   Left 4th MCP - Swollen - - - 1   Left 5th MCP - Tender - - - 1   Left 5th MCP - Swollen - - - 1   Left thumb IP - Tender - - - 1   Left thumb IP - Swollen - - - 1   Left 2nd PIP - Tender - - - 1   Left 2nd PIP - Swollen - - - 1   Left 3rd PIP - Tender - - - 1   Left 3rd PIP - Swollen - - - 1   Left 4th PIP - Tender - - - 1   Left 5th PIP - Tender - - - 1   Right elbow - Tender - - - 1   Right elbow - Swollen - - - 1   Right wrist- Tender - - - 1   Right wrist- Swollen - - - 1   Right 1st MCP - Tender - - - 1   Right 1st MCP - Swollen - - - 1   Right 2nd MCP - Tender - - - 1   Right 2nd MCP - Swollen - - - 1   Right 3rd MCP - Tender - - - 1   Right 3rd MCP - Swollen - - - 1   Right 4th MCP - Tender - - - 1   Right 4th MCP - Swollen - - - 1   Right 5th MCP - Tender - - - 1   Right 5th MCP - Swollen - - - 1   Right thumb IP - Tender - - - 1   Right thumb IP - Swollen - - - 1   Right 2nd PIP - Tender - - - 1   Right 2nd PIP - Swollen - - - 1   Right 3rd PIP - Tender - - - 1   Right 3rd PIP - Swollen - - - 1   Right 4th PIP - Tender - - - 1   Right 4th PIP - Swollen - - - 1   Right 5th PIP - Tender - - - 1   Right 5th PIP - Swollen - - - 1   Physician Assessment (0-10) - - - 9   Patient Assessment (0-10) 7.5 6 7 9       DATA REVIEW    Laboratory     The following laboratory results were reviewed and discussed with the patient:    Hospital Outpatient Visit on 12/16/2016   Component Date Value    WBC 12/16/2016 10.0     RBC 12/16/2016 4.58     HGB 12/16/2016 14.6     HCT 12/16/2016 44.3     MCV 12/16/2016 96.7     MCH 12/16/2016 31.9     MCHC 12/16/2016 33.0     RDW 12/16/2016 15.0*    PLATELET 45/22/9729 251     NEUTROPHILS 12/16/2016 83*    LYMPHOCYTES 12/16/2016 13     MONOCYTES 12/16/2016 3*    EOSINOPHILS 12/16/2016 0     BASOPHILS 12/16/2016 0     MYELOCYTES 12/16/2016 1*    ABS. NEUTROPHILS 12/16/2016 8.3*    ABS. LYMPHOCYTES 12/16/2016 1.3     ABS. MONOCYTES 12/16/2016 0.3     ABS. EOSINOPHILS 12/16/2016 0.0     ABS. BASOPHILS 12/16/2016 0.0     RBC COMMENTS 12/16/2016                      Value:ANISOCYTOSIS  1+      WBC COMMENTS 12/16/2016 REACTIVE LYMPHS     Sodium 12/16/2016 140     Potassium 12/16/2016 4.5     Chloride 12/16/2016 103     CO2 12/16/2016 26     Anion gap 12/16/2016 11     Glucose 12/16/2016 203*    BUN 12/16/2016 22*    Creatinine 12/16/2016 0.95     BUN/Creatinine ratio 12/16/2016 23*    GFR est AA 12/16/2016 >60     GFR est non-AA 12/16/2016 60*    Calcium 12/16/2016 8.3*    Bilirubin, total 12/16/2016 0.3     ALT (SGPT) 12/16/2016 52     AST (SGOT) 12/16/2016 24     Alk. phosphatase 12/16/2016 109     Protein, total 12/16/2016 7.2     Albumin 12/16/2016 3.6     Globulin 12/16/2016 3.6     A-G Ratio 12/16/2016 1.0*       Imaging    Musculoskeletal Ultrasound    None    Radiographs    Left Elbow 10/13/2016: mild to moderate diffuse joint space loss with moderate-sized marginal osteophytes. An elbow effusion is evident. Bones are moderately osteopenic. No acute fracture or dislocation is shown. Bilateral Hand 10/13/2016: moderate diffuse osteopenia. Bilateral carpal soft tissue swelling is shown which is greater on the right. Severe joint space loss is demonstrated in the radiocarpal joints bilaterally with scapholunate advanced collapse of the right. Right capitate abuts the distal radius. Incongruence on the left is shown between the scaphoid and lunate with mild interspace widening indicating early scapholunate advanced collapse. Small right bilateral distal radioulnar joint osteophytes are shown. A 2 to 3 mm loose body is shown on the right. There is moderate bilateral first CMC osteoarthrosis with lateral subluxation and tiny marginal osteophytes.  Mild to moderate uniform joint space narrowing is shown throughout the metacarpophalangeal joints with only minimal bilateral second and left third MCP joint osteophyte formation. Mild MCP level bilateral soft tissue swelling is present. Digital assessment shows mild joint space narrowing of the right second and third and the left third DIP joints. Tiny osteophytes are shown at these articulations as well as a small periosteal calcification along the medial aspect of the right ring finger middle phalangeal head. No definite erosions are identified nor is there soft tissue calcifications indicative of calcium pyrophosphate dihydrate deposition. Bilateral Foot 10/13/2016: chronic appearing fracture at the base of the right fifth metatarsal bone with fracture gap widening measuring up to 6 mm and mild adjacent soft tissue swelling. There is no other evidence for acute or chronic fracture or dislocation. Mild bilateral first metatarsophalangeal joint osteoarthrosis is demonstrated with tiny marginal osteophytes. Mild osseous hypertrophy of the medial first metatarsal heads is shown bilaterally with nonmarginal erosions with sclerotic margins and mild overlying soft tissue swelling. There is also mild valgus at posterior first MTP joints would lateral subluxation of the hallux sesamoid bones. Soft tissue swelling also overlies the fifth metatarsal heads bilaterally. On the right, there are nonmarginal erosions measuring up to 6 mm in size, also sharply demarcated with sclerotic margins. Mild joint space loss of the left fifth metatarsophalangeal joint is demonstrated with tiny marginal osteophytes. Several discrete nonmarginal erosions are shown medially and laterally in the third metatarsal head with mild MTP joint space loss on the right. First through third tarsometatarsal joint space narrowing is present bilaterally with subcortical demineralization at the right third metatarsal base. Mild overlying dorsal soft tissue swelling is shown.  The patient is status post resection of the left second metatarsal head. Extensive atherosclerotic calcifications are shown. Pelvic 1/07/2016: bilateral hip prostheses are partially visualized and in satisfactory alignment. There is no acute fracture. Lumbosacral fusion hardware is intact. 1/07/2016:    Lumbar 7/11/2014: recent laminectomy and instrumentation from , with placement of rods and pedicle screws. The hardware appears intact and hardware position is appropriate. Degenerative disc disease is present from L2-S1. There is grade 1 spondylolisthesis at L4-5 which is probably chronic. There is no evidence of vertebral compression fracture. CT Imaging    CTA chest with and without contrast 7/10/2014: no mediastinal, axillary or hilar lymphadenopathy. There is no pleural or pericardial effusion. The aorta is normal in course and caliber. The proximal pulmonary arteries are without evidence of filling defects, the caliber of the pulmonary arteries is also normal. The pulmonary parenchyma is unremarkable. No lytic or blastic lesions are identified. Subcutaneous edema in the soft tissues. Hepatic steatosis. No aortic aneurysm. No pulmonary embolism. The remainder of the upper abdomen visualized is unremarkable    MR Imaging    None    DXA     DXA 6/25/2015: (Excluded sites: prosthetic hips, L3 and L4 for hardware and both hips for hardware) lumbar spine L1-L2 T score -0.2 (BMD 1.150 g/cm2). ASSESSMENT AND PLAN    This is a follow-up visit for Ms. Johnson. 1) Seropositive Rheumatoid Arthritis. She was tolerating methotrexate 20 mg every Saturday. She was on Actemra infusion but has missed doses since December due to her leg laceration. She has successfully weaned her prednisone down to 5 mg daily. I asked her to call and resume her Actemra. I submitted a resume order. 2) Long Term Use of Systemic Steroids. She is on 5 mg. I am weaning her down slowly. I refilled it today. 3) Fibromyalgia.  This was not an active issue today. 4) Long Term Use of Immunosuppressants. The patient remains on immunomodulatory medications (methotrexate) and requires frequent toxicity monitoring by blood work. Respective labs were ordered (CBC and CMP).    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 She is on alendronate 70 mg weekly with good tolerance. She takes 1000 mg of calcium daily and ergocalciferol.     6) Morbid Obesity. Her BMI was 46.17 (previously 45.32, 43.69, 44.46). She is actively trying to lose weight. She was been more physically active and was doing Aqua Therapy, but has been on hold due her to laceration.    7) Hypertension. Her blood pressure was 149/89 (previously 151/104, 160/112). She is on Coreg 12.5 mg twice daily. I recommend increasing to 25 mg twice daily, valsartan 80 mg daily.    8) Vitamin D Deficiency. Her vitamin D was 24.1. She is on ergocalciferol 50,000 weekly.    9) Hypercholesterolemia. Her cholesterol was 215. She is on Lipitor 80 mg. I will repeat it today. 10) Constant Diaphoresis. This has improved with her prednisone reduction. 11) Major Depression. This is improving. She follow with a therapist and an NP psychiatrist.      12) Vertigo with Frequent Falls. She has not seen Dr. Laquita Nagel. 13) Right Knee Laceration. This has improved. 14) Diabetes Mellitus Type II. Her HbA1c was 6.6%. I will repeat today. The patient voiced understanding of the aforementioned assessment and plan. Summary of plan was provided in the After Visit Summary patient instructions.      TODAY'S ORDERS    Orders Placed This Encounter    CBC WITH AUTOMATED DIFF    METABOLIC PANEL, COMPREHENSIVE    C REACTIVE PROTEIN, QT    SED RATE (ESR)    URIC ACID    VITAMIN D, 25 HYDROXY    LIPID PANEL    TSH REFLEX TO T4    HEMOGLOBIN A1C W/O EAG    methotrexate (RHEUMATREX) 2.5 mg tablet    predniSONE (DELTASONE) 5 mg tablet     Future Appointments  Date Time Provider Kenn Eller   3/20/2017 2:00 PM [facility] INFUSION NURSE 2 [facility]   4/17/2017 2:00 PM [facility] INFUSION NURSE 2 [facility]   5/15/2017 2:00 PM [place] INFUSION NURSE 2 [facility]   6/1/2017 1:15 PM Мария Koch MD [address]   6/6/2017 2:20 PM Christian Du MD [address] [place] [address]   6/12/2017 2:00 PM [place] INFUSION NURSE 2 [facility] Ashtyn De Souza MD, [address] [facility]    Adult Rheumatology   Musculoskeletal Ultrasound Certified  [address] [facility]   [address] [facility] [place], [address] [place] [address] Road   Phone [phone]  Fax [phone]

## 2017-03-06 NOTE — LETTER
3/6/2017 2:49 PM 
 
Ms. Poe [facility] [address] [facility] [address] [facility] [id number] To whom it may concern, 
 
Marry Ring may resume Actemra infusions.  
 
 
 
 
Sincerely, 
 
 
Christian Du MD

## 2017-03-06 NOTE — PATIENT INSTRUCTIONS
Labs today    I refilled methotrexate 8 tablets every Saturday    I refilled prednisone 5 mg daily    Please resume Actemra infusions

## 2017-03-06 NOTE — MR AVS SNAPSHOT
Visit Information Date & Time Provider Department Dept. Phone Encounter #  
 3/6/2017  2:20 PM Mart Hernandez MD [facility] [id number] Follow-up Instructions Return in about 3 months (around 6/6/2017). Your Appointments 6/1/2017  1:15 PM  
New Patient with MD Chantal German. Mulugeta Packer [address] [facility]) Appt Note: est pcp new pt visit,  
 [place] [phone]  
[facility] [address] Upcoming Health Maintenance Date Due  
 FOOT EXAM Q1 12/29/1963 MICROALBUMIN Q1 12/29/1963 EYE EXAM RETINAL OR DILATED Q1 12/29/1963 Pneumococcal 19-64 Medium Risk (1 of 1 - PPSV23) 12/29/1972 DTaP/Tdap/Td series (1 - Tdap) 12/29/1974 FOBT Q 1 YEAR AGE 50-75 12/29/2003 BREAST CANCER SCRN MAMMOGRAM 8/19/2016 HEMOGLOBIN A1C Q6M 4/13/2017 LIPID PANEL Q1 10/13/2017 PAP AKA CERVICAL CYTOLOGY 8/14/2020 Allergies as of 3/6/2017  Review Complete On: 3/6/2017 By: Nuris Armando RN No Known Allergies Current Immunizations  Reviewed on 2/24/2017 Name Date Influenza Vaccine 9/6/2016 Influenza Vaccine Veronia Ingrid) 11/19/2015  2:49 PM  
  
 Not reviewed this visit You Were Diagnosed With   
  
 Codes Comments Seropositive rheumatoid arthritis of multiple sites [facility])    -  Primary ICD-10-CM: M05.79 ICD-9-CM: 714.0 Long term current use of immunosuppressive drug     ICD-10-CM: Z79.899 ICD-9-CM: V58.69 Morbid obesity with BMI of 45.0-49.9, adult (HCC)     ICD-10-CM: E66.01, Z68.42 
ICD-9-CM: 278.01, V85.42 Long term (current) use of systemic steroids     ICD-10-CM: Z79.52 
ICD-9-CM: V58.65 Vitamin D deficiency     ICD-10-CM: E55.9 ICD-9-CM: 268.9 Severe episode of recurrent major depressive disorder, without psychotic features ([facility] [address]     ICD-10-CM: F33.2 ICD-9-CM: 296.33   
 Flare of rheumatoid arthritis ([facility] [address]     ICD-10-CM: M06.9 ICD-9-CM: 714.0 Acquired hypothyroidism     ICD-10-CM: E03.9 ICD-9-CM: 244.9 Controlled type 2 diabetes mellitus with complication, without long-term current use of insulin (HCC)     ICD-10-CM: E11.8 ICD-9-CM: 250.90 Generalized hyperhidrosis     ICD-10-CM: R61 
ICD-9-CM: 780.8 Essential hypertension     ICD-10-CM: I10 
ICD-9-CM: 401.9 Hypercholesterolemia     ICD-10-CM: E78.00 ICD-9-CM: 272.0 Vitals BP Pulse Temp Resp Height(growth percentile) Weight(growth percentile) 149/89 (BP 1 Location: Left arm, BP Patient Position: Sitting) (!) 104 98 °F (36.7 °C) 18 5' 4\" (1.626 m) 269 lb (122 kg) SpO2 BMI OB Status Smoking Status 95% 46.17 kg/m2 Postmenopausal Former Smoker BMI and BSA Data Body Mass Index Body Surface Area  
 46.17 kg/m 2 2.35 m 2 Preferred Pharmacy Pharmacy Name Phone [address] [phone] Your Updated Medication List  
  
   
This list is accurate as of: 3/6/17  2:53 PM.  Always use your most recent med list.  
  
  
  
  
 albuterol-ipratropium 2.5 mg-0.5 mg/3 ml Nebu Commonly known as:  DUO-NEB  
every six (6) hours as needed. alendronate 70 mg tablet Commonly known as:  FOSAMAX Take 70 mg by mouth every seven (7) days. atorvastatin 40 mg tablet Commonly known as:  LIPITOR  
TAKE 1 TABLET BY MOUTH DAILY  
  
 buPROPion  mg XL tablet Commonly known as:  WELLBUTRIN XL  
TAKE 1 TABLET BY MOUTH EVERY MORNING  
  
 CALCIUM 600 + D 600-125 mg-unit Tab Generic drug:  calcium-cholecalciferol (d3) Take  by mouth daily. carvedilol 12.5 mg tablet Commonly known as:  COREG  
TAKE 1 TABLET BY MOUTH TWICE DAILY WITH MEALS FOR HIGH BLOOD PRESSURE DULoxetine 40 mg Cpdr  
TK 1 C PO QD  
  
 folic acid 1 mg tablet Commonly known as:  Google Take 1 Tab by mouth daily for 90 days. gabapentin 300 mg capsule Commonly known as:  NEURONTIN One qHS for 3 days, then may increase up to one 3 times/day  
  
 levothyroxine 100 mcg tablet Commonly known as:  SYNTHROID  
TAKE 1 TABLET BY MOUTH DAILY  
  
 methotrexate 2.5 mg tablet Commonly known as:  Gerkima Zandra Take 8 Tabs by mouth Every Saturday for 90 days. Start taking on:  3/11/2017  
  
 predniSONE 5 mg tablet Commonly known as:  Therman Rail Take 1 Tab by mouth daily for 90 days. PROAIR HFA 90 mcg/actuation inhaler Generic drug:  albuterol Take 2 Puffs by inhalation every four (4) hours as needed. TOCILIZUMAB IV  
by IntraVENous route every thirty (30) days. Not taking  
  
 valsartan 80 mg tablet Commonly known as:  DIOVAN Take 1 Tab by mouth daily. VITAMIN D2 50,000 unit capsule Generic drug:  ergocalciferol Take 50,000 Units by mouth every seven (7) days. For 8 weeks Prescriptions Sent to Pharmacy Refills  
 methotrexate (RHEUMATREX) 2.5 mg tablet 0 Starting on: 3/11/2017 Sig: Take 8 Tabs by mouth Every Saturday for 90 days. Class: Normal  
 [facility] [address] [facility] Carlota Burket Ph #: [phone] Route: Oral  
 predniSONE (DELTASONE) 5 mg tablet 0 Sig: Take 1 Tab by mouth daily for 90 days. Class: Normal  
 Pharmacy: [facility] [address] [facility] Carlota Burket Ph #: [phone] Route: Oral  
  
We Performed the Following C REACTIVE PROTEIN, QT [17243 CPT(R)] CBC WITH AUTOMATED DIFF [21509 CPT(R)] HEMOGLOBIN A1C W/O EAG [64921 CPT(R)] LIPID PANEL [14134 CPT(R)] METABOLIC PANEL, COMPREHENSIVE [79712 CPT(R)] SED RATE (ESR) [id number] CPT(R)] TSH REFLEX TO T4 [[id number] Custom] URIC ACID [id number] CPT(R)] VITAMIN D, 25 HYDROXY [id number] CPT(R)] Follow-up Instructions Return in about 3 months (around 6/6/2017). Patient Instructions Labs today I refilled methotrexate 8 tablets every Saturday I refilled prednisone 5 mg daily Please resume Actemra infusions Introducing [facility] [place] [facility] SERVICES! Dear Sarah Roy: Thank you for requesting a [employer] account. Our records indicate that you have previously registered for a [employer] account but its currently inactive. Please call our [employer] support line at [phone]. Additional Information If you have questions, please visit the Frequently Asked Questions section of the [employer] website at [email or web address] Remember, [employer] is NOT to be used for urgent needs. For medical emergencies, dial 911. Now available from your iPhone and Android! Please provide this summary of care documentation to your next provider. Your primary care clinician is listed as [address]. If you have any questions after today's visit, please call [phone].

## 2017-03-07 LAB
25(OH)D3+25(OH)D2 SERPL-MCNC: 27.9 NG/ML (ref 30–100)
ALBUMIN SERPL-MCNC: 5 G/DL (ref 3.6–4.8)
ALBUMIN/GLOB SERPL: 3.1 {RATIO} (ref 1.1–2.5)
ALP SERPL-CCNC: 95 IU/L (ref 39–117)
ALT SERPL-CCNC: 37 IU/L (ref 0–32)
AST SERPL-CCNC: 25 IU/L (ref 0–40)
BASOPHILS # BLD AUTO: 0.1 X10E3/UL (ref 0–0.2)
BASOPHILS NFR BLD AUTO: 1 %
BILIRUB SERPL-MCNC: 0.4 MG/DL (ref 0–1.2)
BUN SERPL-MCNC: 16 MG/DL (ref 8–27)
BUN/CREAT SERPL: 27 (ref 11–26)
CALCIUM SERPL-MCNC: 9.3 MG/DL (ref 8.7–10.3)
CHLORIDE SERPL-SCNC: 100 MMOL/L (ref 96–106)
CHOLEST SERPL-MCNC: 170 MG/DL (ref 100–199)
CO2 SERPL-SCNC: 24 MMOL/L (ref 18–29)
CREAT SERPL-MCNC: 0.6 MG/DL (ref 0.57–1)
CRP SERPL-MCNC: 12.5 MG/L (ref 0–4.9)
EOSINOPHIL # BLD AUTO: 0.4 X10E3/UL (ref 0–0.4)
EOSINOPHIL NFR BLD AUTO: 4 %
ERYTHROCYTE [DISTWIDTH] IN BLOOD BY AUTOMATED COUNT: 14.1 % (ref 12.3–15.4)
ERYTHROCYTE [SEDIMENTATION RATE] IN BLOOD BY WESTERGREN METHOD: 22 MM/HR (ref 0–40)
GLOBULIN SER CALC-MCNC: 1.6 G/DL (ref 1.5–4.5)
GLUCOSE SERPL-MCNC: 117 MG/DL (ref 65–99)
HBA1C MFR BLD: 7.3 % (ref 4.8–5.6)
HCT VFR BLD AUTO: 40.5 % (ref 34–46.6)
HDLC SERPL-MCNC: 62 MG/DL
HGB BLD-MCNC: 13.4 G/DL (ref 11.1–15.9)
IMM GRANULOCYTES # BLD: 0.1 X10E3/UL (ref 0–0.1)
IMM GRANULOCYTES NFR BLD: 1 %
LDLC SERPL CALC-MCNC: 76 MG/DL (ref 0–99)
LYMPHOCYTES # BLD AUTO: 1.7 X10E3/UL (ref 0.7–3.1)
LYMPHOCYTES NFR BLD AUTO: 20 %
MCH RBC QN AUTO: 32.2 PG (ref 26.6–33)
MCHC RBC AUTO-ENTMCNC: 33.1 G/DL (ref 31.5–35.7)
MCV RBC AUTO: 97 FL (ref 79–97)
MONOCYTES # BLD AUTO: 0.6 X10E3/UL (ref 0.1–0.9)
MONOCYTES NFR BLD AUTO: 7 %
NEUTROPHILS # BLD AUTO: 5.8 X10E3/UL (ref 1.4–7)
NEUTROPHILS NFR BLD AUTO: 67 %
PLATELET # BLD AUTO: 286 X10E3/UL (ref 150–379)
POTASSIUM SERPL-SCNC: 4.5 MMOL/L (ref 3.5–5.2)
PROT SERPL-MCNC: 6.6 G/DL (ref 6–8.5)
RBC # BLD AUTO: 4.16 X10E6/UL (ref 3.77–5.28)
SODIUM SERPL-SCNC: 141 MMOL/L (ref 134–144)
TRIGL SERPL-MCNC: 162 MG/DL (ref 0–149)
TSH SERPL DL<=0.005 MIU/L-ACNC: 1.92 UIU/ML (ref 0.45–4.5)
URATE SERPL-MCNC: 3.9 MG/DL (ref 2.5–7.1)
VLDLC SERPL CALC-MCNC: 32 MG/DL (ref 5–40)
WBC # BLD AUTO: 8.6 X10E3/UL (ref 3.4–10.8)

## 2017-03-10 ENCOUNTER — APPOINTMENT (OUTPATIENT)
Dept: INFUSION THERAPY | Age: 62
End: 2017-03-10

## 2017-03-13 ENCOUNTER — TELEPHONE (OUTPATIENT)
Dept: RHEUMATOLOGY | Age: 62
End: 2017-03-13

## 2017-03-13 NOTE — TELEPHONE ENCOUNTER
Patient would like a call back from nurse regarding currently weaning off prednisone and would like another aniti inflammatory as she is in pain.   [phone]

## 2017-03-14 ENCOUNTER — TELEPHONE (OUTPATIENT)
Dept: RHEUMATOLOGY | Age: 62
End: 2017-03-14

## 2017-03-14 ENCOUNTER — TELEPHONE (OUTPATIENT)
Dept: INTERNAL MEDICINE CLINIC | Age: 62
End: 2017-03-14

## 2017-03-14 NOTE — PROGRESS NOTES
Please call patient - let her know that I received a copy of Dr. Erma Halsted lab work. Her A1c is up - we talked about starting Metformin at our visit and I do recommend that this be started. Please pend Metformin 500 mg BID to her preferred pharmacy. If she has any questions, please let me know.

## 2017-03-14 NOTE — TELEPHONE ENCOUNTER
Attempt to contact pt via phone to advise of MPL'S result note. Pt did not answer and the VM was full; unable to leave VM requesting return call.

## 2017-03-14 NOTE — TELEPHONE ENCOUNTER
----- Message from [address], MD sent at 3/13/2017 10:04 PM EDT -----  Please call patient - let her know that I received a copy of Dr. Geno Kirkland lab work. Her A1c is up - we talked about starting Metformin at our visit and I do recommend that this be started. Please pend Metformin 500 mg BID to her preferred pharmacy. If she has any questions, please let me know.

## 2017-03-14 NOTE — TELEPHONE ENCOUNTER
Called pt to inform her that Dr. Andrea Valladares stated that she should take Naproxen 2 tabs twice daily with food to use as an anti inflammatory since she is weaning off the prednisone, but she stated that she is already taking that and it is not helping. She stated she has been taking it for a few weeks. She wants to know if there is something else she can take? Please advise.

## 2017-03-15 ENCOUNTER — TELEPHONE (OUTPATIENT)
Dept: RHEUMATOLOGY | Age: 62
End: 2017-03-15

## 2017-03-15 RX ORDER — ACETAMINOPHEN 325 MG/1
650 TABLET ORAL
Status: ACTIVE | OUTPATIENT
Start: 2017-03-20 | End: 2017-03-21

## 2017-03-15 RX ORDER — DIPHENHYDRAMINE HYDROCHLORIDE 50 MG/ML
50 INJECTION, SOLUTION INTRAMUSCULAR; INTRAVENOUS
Status: ACTIVE | OUTPATIENT
Start: 2017-03-20 | End: 2017-03-21

## 2017-03-15 RX ORDER — SODIUM CHLORIDE 9 MG/ML
25 INJECTION, SOLUTION INTRAVENOUS CONTINUOUS
Status: DISPENSED | OUTPATIENT
Start: 2017-03-20 | End: 2017-03-21

## 2017-03-15 NOTE — TELEPHONE ENCOUNTER
Spoke with pt and informed her that Dr. Schuster Sayres would like her to increase her prednisone and take an additional 5mg. She stated an understanding.

## 2017-03-17 ENCOUNTER — TELEPHONE (OUTPATIENT)
Dept: RHEUMATOLOGY | Age: 62
End: 2017-03-17

## 2017-03-17 NOTE — TELEPHONE ENCOUNTER
Spoke with pt and she stated that her pain has gotten worse. She increased her dose of Prednisone to 10mg daily 2 days ago and she is not feeling any relief. She said her pain is all over her body but that she can barely hold weight on her right leg because it hurts so bad. She wants to know if there is something else she can take (another anti-inflammatory) to make her feel better. Please advise.

## 2017-03-20 ENCOUNTER — HOSPITAL ENCOUNTER (OUTPATIENT)
Dept: INFUSION THERAPY | Age: 62
Discharge: HOME | End: 2017-03-20
Payer: MEDICARE

## 2017-03-20 ENCOUNTER — TELEPHONE (OUTPATIENT)
Dept: RHEUMATOLOGY | Age: 62
End: 2017-03-20

## 2017-03-20 ENCOUNTER — APPOINTMENT (OUTPATIENT)
Dept: INFUSION THERAPY | Age: 62
End: 2017-03-20

## 2017-03-20 NOTE — TELEPHONE ENCOUNTER
Spoke with pt and let her know that we can send it Celebrex for her to take instead of the naproxen. She stated an understanding.

## 2017-03-20 NOTE — TELEPHONE ENCOUNTER
Spoke with pt and let her know that we can send in Celebrex for her to take instead of the naproxen. She stated and understanding and would like to try it. Please advise on dosage.

## 2017-03-21 RX ORDER — CELECOXIB 200 MG/1
200 CAPSULE ORAL 2 TIMES DAILY
Qty: 60 CAP | Refills: 2 | Status: SHIPPED | OUTPATIENT
Start: 2017-03-21 | End: 2017-06-19

## 2017-03-22 ENCOUNTER — TELEPHONE (OUTPATIENT)
Dept: RHEUMATOLOGY | Age: 62
End: 2017-03-22

## 2017-03-22 NOTE — TELEPHONE ENCOUNTER
Patient would like a call back from nurse states the medication is too expensive she couldn't remember which one though.  [phone]

## 2017-03-22 NOTE — TELEPHONE ENCOUNTER
Spoke with pt who stated that she cannot afford the Celebrex and she is now taking 15mg of prednisone a day and it is helping. I told her that Dr. Holli Trevino only wanted her to take 10mg a day and she stated that it was not helping, but when she increased it to 15mg it did help. She wants to know if there is another medication she can take with the prednisone that she can afford because she knows that she is not supposed to take 15mg of presnisone. Please advise.

## 2017-03-23 ENCOUNTER — TELEPHONE (OUTPATIENT)
Dept: RHEUMATOLOGY | Age: 62
End: 2017-03-23

## 2017-03-23 NOTE — TELEPHONE ENCOUNTER
Pt stated that she is going to get her Actemra infusion next Monday because she could not get it this week. I told her that per Dr. Amando Yoon there are not other medications that he can prescribe her at this time other than the celebrex. I told her that she needs to wait until she gets her infusion and see how she feels after that. She wanted to make sure that it was ok for her to stay on the 15mg of prednisone in the mean time?

## 2017-03-23 NOTE — TELEPHONE ENCOUNTER
Informed pt that it is ok per Dr. Sri Johnson to stay on 15mg of prednisone and to get her infusion on Monday next week and to then let us know how she is doing after the infusion to see if we can then drop her dose back to 10mg. She stated an understanding.

## 2017-03-24 RX ORDER — BUPROPION HYDROCHLORIDE 300 MG/1
TABLET ORAL
Qty: 30 TAB | Refills: 0 | OUTPATIENT
Start: 2017-03-24

## 2017-03-24 NOTE — TELEPHONE ENCOUNTER
pls call patient. Received request for Wellbutrin. I'm happy to renew it but at last visit she stated she was seeing psychiatrist NP. Please confirm if dose the same and see if she wants me to renew it.

## 2017-03-27 ENCOUNTER — TELEPHONE (OUTPATIENT)
Dept: INTERNAL MEDICINE CLINIC | Age: 62
End: 2017-03-27

## 2017-03-27 ENCOUNTER — HOSPITAL ENCOUNTER (OUTPATIENT)
Dept: INFUSION THERAPY | Age: 62
Discharge: HOME | End: 2017-03-27
Payer: MEDICARE

## 2017-03-27 VITALS
BODY MASS INDEX: 45.83 KG/M2 | DIASTOLIC BLOOD PRESSURE: 94 MMHG | SYSTOLIC BLOOD PRESSURE: 140 MMHG | TEMPERATURE: 97.3 F | WEIGHT: 267 LBS | RESPIRATION RATE: 18 BRPM | HEART RATE: 91 BPM

## 2017-03-27 PROCEDURE — 74011250636 HC RX REV CODE- 250/636: Performed by: INTERNAL MEDICINE

## 2017-03-27 PROCEDURE — 74011000258 HC RX REV CODE- 258: Performed by: INTERNAL MEDICINE

## 2017-03-27 PROCEDURE — 96375 TX/PRO/DX INJ NEW DRUG ADDON: CPT

## 2017-03-27 PROCEDURE — 96413 CHEMO IV INFUSION 1 HR: CPT

## 2017-03-27 RX ORDER — SODIUM CHLORIDE 9 MG/ML
25 INJECTION, SOLUTION INTRAVENOUS CONTINUOUS
Status: DISPENSED | OUTPATIENT
Start: 2017-03-27 | End: 2017-03-28

## 2017-03-27 RX ORDER — SODIUM CHLORIDE 0.9 % (FLUSH) 0.9 %
10 SYRINGE (ML) INJECTION AS NEEDED
Status: ACTIVE | OUTPATIENT
Start: 2017-03-27 | End: 2017-03-28

## 2017-03-27 RX ORDER — ACETAMINOPHEN 325 MG/1
650 TABLET ORAL
Status: ACTIVE | OUTPATIENT
Start: 2017-03-27 | End: 2017-03-28

## 2017-03-27 RX ORDER — BUPROPION HYDROCHLORIDE 300 MG/1
TABLET ORAL
Qty: 30 TAB | Refills: 3 | Status: CANCELLED | OUTPATIENT
Start: 2017-03-27

## 2017-03-27 RX ORDER — DIPHENHYDRAMINE HYDROCHLORIDE 50 MG/ML
50 INJECTION, SOLUTION INTRAMUSCULAR; INTRAVENOUS
Status: ACTIVE | OUTPATIENT
Start: 2017-03-27 | End: 2017-03-28

## 2017-03-27 RX ADMIN — METHYLPREDNISOLONE SODIUM SUCCINATE 100 MG: 125 INJECTION, POWDER, FOR SOLUTION INTRAMUSCULAR; INTRAVENOUS at 14:28

## 2017-03-27 RX ADMIN — TOCILIZUMAB 800 MG: 20 INJECTION, SOLUTION, CONCENTRATE INTRAVENOUS at 15:05

## 2017-03-27 RX ADMIN — SODIUM CHLORIDE 25 ML/HR: 900 INJECTION, SOLUTION INTRAVENOUS at 14:31

## 2017-03-27 RX ADMIN — Medication 10 ML: at 14:25

## 2017-03-27 RX ADMIN — Medication 10 ML: at 16:36

## 2017-03-27 NOTE — TELEPHONE ENCOUNTER
Requested Prescriptions     Pending Prescriptions Disp Refills    buPROPion XL (WELLBUTRIN XL) 300 mg XL tablet 30 Tab 3            Pharmacy: [address] [facility] [address]     LOV:02/24/17

## 2017-03-27 NOTE — PROGRESS NOTES
[facility] VISIT NOTE    1400  Pt arrived at [facility] ambulatory and in no distress for Actemra infusion. Actemra was last given 12/2016, but Dr. Erasto Frost sent an order to resume today. Assessment completed, pt denies any s/s of recent infection and says her leg wound is healing nicely and is clean, dry and intact. Patient Vitals for the past 12 hrs:   Temp Pulse Resp BP   03/27/17 1632 97.3 °F (36.3 °C) 91 18 (!) 140/94   03/27/17 1414 98.8 °F (37.1 °C) (!) 102 18 (!) 136/99     24 G PIV placed in right arm, flushes easily with positive blood return. Medications received: Solumedrol IVP  Actemra IV    Patient remained in the [facility] for 30 minutes after completion of the infusion. Tolerated treatment well, no adverse reaction noted. PIV flushed and removed. Positive blood return noted. 1640  D/C'd from [facility] ambulatory with her cane and in no distress.  Next appointment is 4/24/17 at 2pm.

## 2017-03-27 NOTE — TELEPHONE ENCOUNTER
Patient states that her specialist is managing her medication and she is not currently out.  This request must have been sent by pharmacy in error

## 2017-03-30 RX ORDER — GABAPENTIN 300 MG/1
300 CAPSULE ORAL 3 TIMES DAILY
Qty: 90 CAP | Refills: 3 | Status: SHIPPED | OUTPATIENT
Start: 2017-03-30 | End: 2017-04-07 | Stop reason: SDUPTHER

## 2017-03-31 RX ORDER — BUPROPION HYDROCHLORIDE 300 MG/1
300 TABLET ORAL
Qty: 30 TAB | Refills: 3 | Status: SHIPPED | OUTPATIENT
Start: 2017-03-31 | End: 2017-04-07 | Stop reason: SDUPTHER

## 2017-04-04 DIAGNOSIS — R29.6 FALLS FREQUENTLY: ICD-10-CM

## 2017-04-04 DIAGNOSIS — R42 DIZZINESS: Primary | ICD-10-CM

## 2017-04-07 DIAGNOSIS — Z79.899 LONG TERM CURRENT USE OF IMMUNOSUPPRESSIVE DRUG: ICD-10-CM

## 2017-04-07 DIAGNOSIS — E78.00 HYPERCHOLESTEROLEMIA: ICD-10-CM

## 2017-04-07 DIAGNOSIS — M06.9 FLARE OF RHEUMATOID ARTHRITIS (HCC): ICD-10-CM

## 2017-04-07 DIAGNOSIS — M05.79 SEROPOSITIVE RHEUMATOID ARTHRITIS OF MULTIPLE SITES (HCC): ICD-10-CM

## 2017-04-07 RX ORDER — ATORVASTATIN CALCIUM 40 MG/1
TABLET, FILM COATED ORAL
Qty: 90 TAB | Refills: 3 | Status: SHIPPED | OUTPATIENT
Start: 2017-04-07 | End: 2018-02-02 | Stop reason: SDUPTHER

## 2017-04-07 RX ORDER — LEVOTHYROXINE SODIUM 100 UG/1
TABLET ORAL
Qty: 90 TAB | Refills: 3 | Status: SHIPPED | OUTPATIENT
Start: 2017-04-07 | End: 2018-02-02 | Stop reason: SDUPTHER

## 2017-04-07 RX ORDER — BUPROPION HYDROCHLORIDE 300 MG/1
TABLET ORAL
Qty: 90 TAB | Refills: 1 | Status: SHIPPED | OUTPATIENT
Start: 2017-04-07 | End: 2017-11-02 | Stop reason: SDUPTHER

## 2017-04-07 RX ORDER — PREDNISONE 5 MG/1
TABLET ORAL
Qty: 90 TAB | Refills: 0 | Status: SHIPPED | OUTPATIENT
Start: 2017-04-07 | End: 2017-04-10 | Stop reason: SDUPTHER

## 2017-04-07 RX ORDER — GABAPENTIN 300 MG/1
CAPSULE ORAL
Qty: 270 CAP | Refills: 1 | Status: SHIPPED | OUTPATIENT
Start: 2017-04-07 | End: 2018-04-12 | Stop reason: SDUPTHER

## 2017-04-10 ENCOUNTER — TELEPHONE (OUTPATIENT)
Dept: RHEUMATOLOGY | Age: 62
End: 2017-04-10

## 2017-04-10 DIAGNOSIS — M06.9 FLARE OF RHEUMATOID ARTHRITIS (HCC): ICD-10-CM

## 2017-04-10 DIAGNOSIS — M05.79 SEROPOSITIVE RHEUMATOID ARTHRITIS OF MULTIPLE SITES (HCC): ICD-10-CM

## 2017-04-10 DIAGNOSIS — Z79.899 LONG TERM CURRENT USE OF IMMUNOSUPPRESSIVE DRUG: ICD-10-CM

## 2017-04-10 RX ORDER — PREDNISONE 5 MG/1
15 TABLET ORAL DAILY
Qty: 90 TAB | Refills: 0 | Status: SHIPPED | OUTPATIENT
Start: 2017-04-10 | End: 2017-06-29 | Stop reason: DRUGHIGH

## 2017-04-10 NOTE — TELEPHONE ENCOUNTER
Patient requested a refill of predisone on Friday and pharmacy has not received a response patient states out of medication.   [phone]

## 2017-04-10 NOTE — TELEPHONE ENCOUNTER
Spoke with pt and let her know that her prednisone was refilled and sent to [place] on 4/7/17 and she should be able to  the medication. She will call the pharmacy and if they do not have the medication for some reason she will call us back.

## 2017-04-11 NOTE — TELEPHONE ENCOUNTER
Please touch base with patient. Tramadol was not on her med list at our visit on 2/24 and it wasn't discussed at that time. It's important to have documentation regarding a controlled medication at office visits. Please find out if this request is coming from patient or the pharmacy, I can consider a few pills until she is able to get in for a visit to document need for Tramadol / set up an agreement, etc.  I'm not sure if she has chosen or set up a new pcp visit yet.

## 2017-04-11 NOTE — TELEPHONE ENCOUNTER
Contacted patient for further detail  She reports not taking tramadol for the last 6 months however she has been having a few \"bad days\" in which she feels that she needs it since recently lowering her dose of prednisone

## 2017-04-12 RX ORDER — TRAMADOL HYDROCHLORIDE 50 MG/1
50 TABLET ORAL
Qty: 30 TAB | Refills: 0 | OUTPATIENT
Start: 2017-04-12 | End: 2017-06-09 | Stop reason: SDUPTHER

## 2017-04-13 NOTE — TELEPHONE ENCOUNTER
Rx refill called into pharmacy voicemail  Attempted to contact patient without success.  Left voicemail message requesting a call back to the office

## 2017-04-13 NOTE — TELEPHONE ENCOUNTER
Ok - filled #30 of Tramadol. Please let her know she will need to discuss in office with a provider before another refill is given.

## 2017-04-17 ENCOUNTER — APPOINTMENT (OUTPATIENT)
Dept: INFUSION THERAPY | Age: 62
End: 2017-04-17

## 2017-04-18 RX ORDER — DIPHENHYDRAMINE HYDROCHLORIDE 50 MG/ML
50 INJECTION, SOLUTION INTRAMUSCULAR; INTRAVENOUS
Status: ACTIVE | OUTPATIENT
Start: 2017-04-24 | End: 2017-04-25

## 2017-04-18 RX ORDER — SODIUM CHLORIDE 9 MG/ML
25 INJECTION, SOLUTION INTRAVENOUS CONTINUOUS
Status: DISPENSED | OUTPATIENT
Start: 2017-04-24 | End: 2017-04-25

## 2017-04-18 RX ORDER — ACETAMINOPHEN 325 MG/1
650 TABLET ORAL
Status: ACTIVE | OUTPATIENT
Start: 2017-04-24 | End: 2017-04-25

## 2017-04-24 ENCOUNTER — HOSPITAL ENCOUNTER (OUTPATIENT)
Dept: INFUSION THERAPY | Age: 62
Discharge: HOME | End: 2017-04-24

## 2017-04-27 DIAGNOSIS — I10 ESSENTIAL HYPERTENSION: ICD-10-CM

## 2017-04-27 RX ORDER — CARVEDILOL 25 MG/1
TABLET ORAL
Qty: 180 TAB | Refills: 0 | Status: SHIPPED | OUTPATIENT
Start: 2017-04-27 | End: 2017-07-29 | Stop reason: SDUPTHER

## 2017-05-03 RX ORDER — DIPHENHYDRAMINE HYDROCHLORIDE 50 MG/ML
50 INJECTION, SOLUTION INTRAMUSCULAR; INTRAVENOUS
Status: ACTIVE | OUTPATIENT
Start: 2017-05-08 | End: 2017-05-08

## 2017-05-03 RX ORDER — SODIUM CHLORIDE 9 MG/ML
25 INJECTION, SOLUTION INTRAVENOUS CONTINUOUS
Status: DISPENSED | OUTPATIENT
Start: 2017-05-08 | End: 2017-05-08

## 2017-05-03 RX ORDER — ACETAMINOPHEN 325 MG/1
650 TABLET ORAL
Status: ACTIVE | OUTPATIENT
Start: 2017-05-08 | End: 2017-05-08

## 2017-05-08 ENCOUNTER — HOSPITAL ENCOUNTER (OUTPATIENT)
Dept: INFUSION THERAPY | Age: 62
Discharge: HOME | End: 2017-05-08
Payer: MEDICARE

## 2017-05-08 VITALS
WEIGHT: 267.6 LBS | HEART RATE: 84 BPM | TEMPERATURE: 97.9 F | DIASTOLIC BLOOD PRESSURE: 83 MMHG | OXYGEN SATURATION: 96 % | HEIGHT: 65 IN | RESPIRATION RATE: 18 BRPM | BODY MASS INDEX: 44.58 KG/M2 | SYSTOLIC BLOOD PRESSURE: 135 MMHG

## 2017-05-08 PROCEDURE — 96365 THER/PROPH/DIAG IV INF INIT: CPT

## 2017-05-08 PROCEDURE — 74011250636 HC RX REV CODE- 250/636: Performed by: INTERNAL MEDICINE

## 2017-05-08 PROCEDURE — 74011000258 HC RX REV CODE- 258: Performed by: INTERNAL MEDICINE

## 2017-05-08 RX ADMIN — TOCILIZUMAB 800 MG: 20 INJECTION, SOLUTION, CONCENTRATE INTRAVENOUS at 12:48

## 2017-05-08 RX ADMIN — SODIUM CHLORIDE 25 ML/HR: 900 INJECTION, SOLUTION INTRAVENOUS at 12:09

## 2017-05-08 RX ADMIN — METHYLPREDNISOLONE SODIUM SUCCINATE 100 MG: 125 INJECTION, POWDER, FOR SOLUTION INTRAMUSCULAR; INTRAVENOUS at 12:09

## 2017-05-08 NOTE — PROGRESS NOTES
Problem: Knowledge Deficit  Goal: *Verbalizes understanding of procedures and medications  Outcome: Progressing Towards Goal  Pt here for actemra

## 2017-05-08 NOTE — PROGRESS NOTES
[facility] VISIT NOTE    1120  Pt arrived at [facility] ambulatory and in no distress for q 4 week Actemra. Missed dose on 4/24/17 as patient did not show up for appointment. Assessment completed, pt c/o back pain but no other concerns noted. Patient Vitals for the past 12 hrs:   Temp Pulse Resp BP SpO2   05/08/17 1406 97.9 °F (36.6 °C) 84 18 135/83 -   05/08/17 1126 98 °F (36.7 °C) 93 18 110/77 96 %     PIV started in left forearm without difficulty. Blood return noted. Medications received:  Methylprednisolone IV  Actemra IV    Tolerated treatment well, no adverse reaction noted. PIV de-accessed, bandaid applied. 1410  D/C'd from [facility] ambulatory and in no distress accompanied by .  Next appointment is

## 2017-05-15 ENCOUNTER — APPOINTMENT (OUTPATIENT)
Dept: INFUSION THERAPY | Age: 62
End: 2017-05-15
Payer: MEDICARE

## 2017-05-22 ENCOUNTER — APPOINTMENT (OUTPATIENT)
Dept: INFUSION THERAPY | Age: 62
End: 2017-05-22
Payer: MEDICARE

## 2017-05-30 DIAGNOSIS — I10 ESSENTIAL HYPERTENSION: ICD-10-CM

## 2017-05-31 RX ORDER — SODIUM CHLORIDE 9 MG/ML
25 INJECTION, SOLUTION INTRAVENOUS CONTINUOUS
Status: DISPENSED | OUTPATIENT
Start: 2017-06-05 | End: 2017-06-05

## 2017-05-31 RX ORDER — ACETAMINOPHEN 325 MG/1
650 TABLET ORAL
Status: ACTIVE | OUTPATIENT
Start: 2017-06-05 | End: 2017-06-05

## 2017-05-31 RX ORDER — DIPHENHYDRAMINE HYDROCHLORIDE 50 MG/ML
50 INJECTION, SOLUTION INTRAMUSCULAR; INTRAVENOUS
Status: ACTIVE | OUTPATIENT
Start: 2017-06-05 | End: 2017-06-05

## 2017-06-01 RX ORDER — VALSARTAN 80 MG/1
TABLET ORAL
Qty: 90 TAB | Refills: 3 | Status: SHIPPED | OUTPATIENT
Start: 2017-06-01 | End: 2019-08-28

## 2017-06-05 ENCOUNTER — TELEPHONE (OUTPATIENT)
Dept: RHEUMATOLOGY | Age: 62
End: 2017-06-05

## 2017-06-05 ENCOUNTER — HOSPITAL ENCOUNTER (OUTPATIENT)
Dept: INFUSION THERAPY | Age: 62
Discharge: HOME | End: 2017-06-05
Payer: MEDICARE

## 2017-06-05 VITALS
DIASTOLIC BLOOD PRESSURE: 80 MMHG | SYSTOLIC BLOOD PRESSURE: 114 MMHG | HEART RATE: 102 BPM | TEMPERATURE: 98.2 F | WEIGHT: 275 LBS | RESPIRATION RATE: 18 BRPM | BODY MASS INDEX: 46.47 KG/M2

## 2017-06-05 LAB
ALBUMIN SERPL BCP-MCNC: 3.8 G/DL (ref 3.5–5)
ALBUMIN/GLOB SERPL: 1.3 {RATIO} (ref 1.1–2.2)
ALP SERPL-CCNC: 95 U/L (ref 45–117)
ALT SERPL-CCNC: 120 U/L (ref 12–78)
ANION GAP BLD CALC-SCNC: 10 MMOL/L (ref 5–15)
AST SERPL W P-5'-P-CCNC: 59 U/L (ref 15–37)
BASOPHILS # BLD AUTO: 0 K/UL (ref 0–0.1)
BASOPHILS # BLD: 1 % (ref 0–1)
BILIRUB SERPL-MCNC: 0.4 MG/DL (ref 0.2–1)
BUN SERPL-MCNC: 15 MG/DL (ref 6–20)
BUN/CREAT SERPL: 19 (ref 12–20)
CALCIUM SERPL-MCNC: 8.7 MG/DL (ref 8.5–10.1)
CHLORIDE SERPL-SCNC: 105 MMOL/L (ref 97–108)
CO2 SERPL-SCNC: 26 MMOL/L (ref 21–32)
CREAT SERPL-MCNC: 0.81 MG/DL (ref 0.55–1.02)
CRP SERPL-MCNC: <0.29 MG/DL
EOSINOPHIL # BLD: 0.3 K/UL (ref 0–0.4)
EOSINOPHIL NFR BLD: 5 % (ref 0–7)
ERYTHROCYTE [DISTWIDTH] IN BLOOD BY AUTOMATED COUNT: 14.1 % (ref 11.5–14.5)
ERYTHROCYTE [SEDIMENTATION RATE] IN BLOOD: 1 MM/HR (ref 0–30)
GLOBULIN SER CALC-MCNC: 3 G/DL (ref 2–4)
GLUCOSE SERPL-MCNC: 136 MG/DL (ref 65–100)
HCT VFR BLD AUTO: 44.5 % (ref 35–47)
HGB BLD-MCNC: 14.7 G/DL (ref 11.5–16)
LYMPHOCYTES # BLD AUTO: 16 % (ref 12–49)
LYMPHOCYTES # BLD: 1.2 K/UL (ref 0.8–3.5)
MCH RBC QN AUTO: 32 PG (ref 26–34)
MCHC RBC AUTO-ENTMCNC: 33 G/DL (ref 30–36.5)
MCV RBC AUTO: 96.9 FL (ref 80–99)
MONOCYTES # BLD: 0.5 K/UL (ref 0–1)
MONOCYTES NFR BLD AUTO: 7 % (ref 5–13)
NEUTS SEG # BLD: 5.3 K/UL (ref 1.8–8)
NEUTS SEG NFR BLD AUTO: 71 % (ref 32–75)
PLATELET # BLD AUTO: 196 K/UL (ref 150–400)
POTASSIUM SERPL-SCNC: 4.6 MMOL/L (ref 3.5–5.1)
PROT SERPL-MCNC: 6.8 G/DL (ref 6.4–8.2)
RBC # BLD AUTO: 4.59 M/UL (ref 3.8–5.2)
SODIUM SERPL-SCNC: 141 MMOL/L (ref 136–145)
WBC # BLD AUTO: 7.4 K/UL (ref 3.6–11)

## 2017-06-05 PROCEDURE — 36415 COLL VENOUS BLD VENIPUNCTURE: CPT | Performed by: INTERNAL MEDICINE

## 2017-06-05 PROCEDURE — 80053 COMPREHEN METABOLIC PANEL: CPT | Performed by: INTERNAL MEDICINE

## 2017-06-05 PROCEDURE — 85025 COMPLETE CBC W/AUTO DIFF WBC: CPT | Performed by: INTERNAL MEDICINE

## 2017-06-05 PROCEDURE — 85652 RBC SED RATE AUTOMATED: CPT | Performed by: INTERNAL MEDICINE

## 2017-06-05 PROCEDURE — 86140 C-REACTIVE PROTEIN: CPT | Performed by: INTERNAL MEDICINE

## 2017-06-05 PROCEDURE — 99211 OFF/OP EST MAY X REQ PHY/QHP: CPT

## 2017-06-05 RX ORDER — SODIUM CHLORIDE 0.9 % (FLUSH) 0.9 %
10 SYRINGE (ML) INJECTION AS NEEDED
Status: ACTIVE | OUTPATIENT
Start: 2017-06-05 | End: 2017-06-06

## 2017-06-05 NOTE — PROGRESS NOTES
[facility] VISIT NOTE    1100  Pt arrived at [place] ambulatory and in no distress for Actemra infusion. Assessment completed, pt c/o increased joint and back pain due to the rainy weather. No other complaints today. Blood pressure 114/80, pulse (!) 102, temperature 98.2 °F (36.8 °C), resp. rate 18, weight 124.7 kg (275 lb). 24 G PIV placed in right arm, flushes easily with positive blood return. Labs drawn. 1400 Only partial results from the CMP are resulted and LFTs are needed for treatment parameters on the order. Patient needs to  her grandson at , so she has decided to reschedule her treatment for another day this week. PIV flushed and removed. 1400  D/C'd from [facility] and in no distress. Next appointment is 6/7/17 at 1400. Final labs and note sent to Dr. Shanna Smith. Recent Results (from the past 12 hour(s))   CBC WITH AUTOMATED DIFF    Collection Time: 06/05/17 11:17 AM   Result Value Ref Range    WBC 7.4 3.6 - 11.0 K/uL    RBC 4.59 3.80 - 5.20 M/uL    HGB 14.7 11.5 - 16.0 g/dL    HCT 44.5 35.0 - 47.0 %    MCV 96.9 80.0 - 99.0 FL    MCH 32.0 26.0 - 34.0 PG    MCHC 33.0 30.0 - 36.5 g/dL    RDW 14.1 11.5 - 14.5 %    PLATELET 029 137 - 973 K/uL    NEUTROPHILS 71 32 - 75 %    LYMPHOCYTES 16 12 - 49 %    MONOCYTES 7 5 - 13 %    EOSINOPHILS 5 0 - 7 %    BASOPHILS 1 0 - 1 %    ABS. NEUTROPHILS 5.3 1.8 - 8.0 K/UL    ABS. LYMPHOCYTES 1.2 0.8 - 3.5 K/UL    ABS. MONOCYTES 0.5 0.0 - 1.0 K/UL    ABS. EOSINOPHILS 0.3 0.0 - 0.4 K/UL    ABS.  BASOPHILS 0.0 0.0 - 0.1 K/UL   METABOLIC PANEL, COMPREHENSIVE    Collection Time: 06/05/17 11:17 AM   Result Value Ref Range    Sodium 141 136 - 145 mmol/L    Potassium 4.6 3.5 - 5.1 mmol/L    Chloride 105 97 - 108 mmol/L    CO2 26 21 - 32 mmol/L    Anion gap 10 5 - 15 mmol/L    Glucose 136 (H) 65 - 100 mg/dL    BUN 15 6 - 20 MG/DL    Creatinine 0.81 0.55 - 1.02 MG/DL    BUN/Creatinine ratio 19 12 - 20      GFR est AA >60 >60 ml/min/1.73m2    GFR est non-AA >60 >60 ml/min/1.73m2    Calcium 8.7 8.5 - 10.1 MG/DL    Bilirubin, total 0.4 0.2 - 1.0 MG/DL    ALT (SGPT) 120 (H) 12 - 78 U/L    AST (SGOT) 59 (H) 15 - 37 U/L    Alk.  phosphatase 95 45 - 117 U/L    Protein, total 6.8 6.4 - 8.2 g/dL    Albumin 3.8 3.5 - 5.0 g/dL    Globulin 3.0 2.0 - 4.0 g/dL    A-G Ratio 1.3 1.1 - 2.2     SED RATE (ESR)    Collection Time: 06/05/17 11:17 AM   Result Value Ref Range    Sed rate, automated 1 0 - 30 mm/hr   C REACTIVE PROTEIN, QT    Collection Time: 06/05/17 11:17 AM   Result Value Ref Range    C-Reactive protein <0.29 <0.60 mg/dL

## 2017-06-05 NOTE — TELEPHONE ENCOUNTER
Left message for pt stating that we received a message regarding her prednisone being rejected at the pharmacy. i told her that she has an appt here in the office tomorrow and Dr. Guido Goodrich will discuss the medication with her then and decide what dose she needs to be on and send in a new prescription from there. Told her to call back with any questions and if not we will see her in the office tomorrow for her appt.

## 2017-06-06 ENCOUNTER — TELEPHONE (OUTPATIENT)
Dept: RHEUMATOLOGY | Age: 62
End: 2017-06-06

## 2017-06-06 NOTE — PROGRESS NOTES
Communicated with Tabby Khan nurse re: follow up lab for PT's elevated liver enzymes. As per, nurse pt is not feeling will and will not be in to see Dr Morelia Frazier for the rest of the week. He wants her to have the Actemra rescheduled until she is able to repeat lab.

## 2017-06-06 NOTE — TELEPHONE ENCOUNTER
Spoke with pt and let her know that due to increased LFT's we are holding her Actemra infusion tomorrow. I informed her that repeat labs needed to be done prior to another infusion. She stated an understanding and asked about her prednisone and if we could send in some for her before she comes in on 6/21/17? She stated that she has 3 tabs left and is in a lot of pain. She had to cancel her appt today due to putting her dog down. Please advise.

## 2017-06-06 NOTE — PROGRESS NOTES
The results were reviewed and a letter was sent. LFTs elevated which could be from inflammation or medications, such as Actemra infusions. Will repeat today.

## 2017-06-07 ENCOUNTER — HOSPITAL ENCOUNTER (OUTPATIENT)
Dept: INFUSION THERAPY | Age: 62
Discharge: HOME | End: 2017-06-07
Payer: MEDICARE

## 2017-06-07 RX ORDER — DIPHENHYDRAMINE HYDROCHLORIDE 50 MG/ML
50 INJECTION, SOLUTION INTRAMUSCULAR; INTRAVENOUS
Status: ACTIVE | OUTPATIENT
Start: 2017-06-07 | End: 2017-06-08

## 2017-06-07 RX ORDER — ACETAMINOPHEN 325 MG/1
650 TABLET ORAL
Status: ACTIVE | OUTPATIENT
Start: 2017-06-07 | End: 2017-06-08

## 2017-06-08 ENCOUNTER — TELEPHONE (OUTPATIENT)
Dept: RHEUMATOLOGY | Age: 62
End: 2017-06-08

## 2017-06-08 NOTE — PROGRESS NOTES
Spoke with Maurilio Peoples from Dr. Liana Torres office. MD will draw lab work at next appt and pt will call to schedule next appt if MD plans to continue with Actemra. MD office will send a resume therapy order if needed.

## 2017-06-08 NOTE — TELEPHONE ENCOUNTER
Patient would like a call back regarding refill of prednisone prior to her next appt.    [phone]

## 2017-06-09 DIAGNOSIS — M05.79 SEROPOSITIVE RHEUMATOID ARTHRITIS OF MULTIPLE SITES (HCC): ICD-10-CM

## 2017-06-09 DIAGNOSIS — Z79.899 LONG TERM CURRENT USE OF IMMUNOSUPPRESSIVE DRUG: ICD-10-CM

## 2017-06-09 DIAGNOSIS — M06.9 FLARE OF RHEUMATOID ARTHRITIS (HCC): ICD-10-CM

## 2017-06-09 RX ORDER — TRAMADOL HYDROCHLORIDE 50 MG/1
TABLET ORAL
Qty: 30 TAB | Refills: 0 | OUTPATIENT
Start: 2017-06-09 | End: 2017-10-24

## 2017-06-09 RX ORDER — PREDNISONE 5 MG/1
10 TABLET ORAL DAILY
Qty: 60 TAB | Refills: 1 | Status: CANCELLED | OUTPATIENT
Start: 2017-06-09

## 2017-06-09 NOTE — TELEPHONE ENCOUNTER
Will refill Tramadol x1.  consulted with last documented fill for #30 4/13. She has chronic pain of RA. She will see her new PCP, Solitario Floyd, on June 12.

## 2017-06-09 NOTE — TELEPHONE ENCOUNTER
Spoke with pt and let her know that Dr. Eulogio Cook has approved prednisone 10mg daily until she is seen in the office. I informed her that I will call in the prescription to [place] today. She stated an understanding.

## 2017-06-09 NOTE — TELEPHONE ENCOUNTER
Rx called into pharmacy VM on file. Requested Prescriptions     Signed Prescriptions Disp Refills    traMADol (ULTRAM) 50 mg tablet 30 Tab 0     Sig: TAKE 1 TABLET BY MOUTH TWICE DAILY AS NEEDED.  DAILY MAX  MGS     Authorizing Provider: Jesus Petersen

## 2017-06-12 ENCOUNTER — OFFICE VISIT (OUTPATIENT)
Dept: FAMILY MEDICINE CLINIC | Age: 62
End: 2017-06-12

## 2017-06-12 ENCOUNTER — APPOINTMENT (OUTPATIENT)
Dept: INFUSION THERAPY | Age: 62
End: 2017-06-12

## 2017-06-12 VITALS
WEIGHT: 272 LBS | SYSTOLIC BLOOD PRESSURE: 116 MMHG | BODY MASS INDEX: 45.32 KG/M2 | RESPIRATION RATE: 18 BRPM | HEIGHT: 65 IN | HEART RATE: 95 BPM | OXYGEN SATURATION: 95 % | DIASTOLIC BLOOD PRESSURE: 77 MMHG | TEMPERATURE: 98.9 F

## 2017-06-12 DIAGNOSIS — E11.9 TYPE 2 DIABETES MELLITUS WITHOUT COMPLICATION, WITHOUT LONG-TERM CURRENT USE OF INSULIN (HCC): ICD-10-CM

## 2017-06-12 DIAGNOSIS — E87.5 HYPERKALEMIA: ICD-10-CM

## 2017-06-12 DIAGNOSIS — Z12.39 SCREENING FOR BREAST CANCER: ICD-10-CM

## 2017-06-12 DIAGNOSIS — E66.01 MORBID OBESITY WITH BMI OF 45.0-49.9, ADULT (HCC): ICD-10-CM

## 2017-06-12 DIAGNOSIS — I10 ESSENTIAL HYPERTENSION: ICD-10-CM

## 2017-06-12 DIAGNOSIS — R06.09 DOE (DYSPNEA ON EXERTION): Primary | ICD-10-CM

## 2017-06-12 RX ORDER — FLUTICASONE PROPIONATE AND SALMETEROL 250; 50 UG/1; UG/1
1 POWDER RESPIRATORY (INHALATION) EVERY 12 HOURS
Qty: 1 EACH | Refills: 3 | Status: SHIPPED | OUTPATIENT
Start: 2017-06-12 | End: 2017-06-21

## 2017-06-12 RX ORDER — ALBUTEROL SULFATE 90 UG/1
2 AEROSOL, METERED RESPIRATORY (INHALATION)
Qty: 1 INHALER | Refills: 2 | Status: SHIPPED | OUTPATIENT
Start: 2017-06-12 | End: 2019-08-28

## 2017-06-12 NOTE — MR AVS SNAPSHOT
Visit Information Date & Time Provider Department Dept. Phone Encounter #  
 6/12/2017  1:00 PM Quoc Valentine MD [address] [place] [address] [id number] Follow-up Instructions Return in about 2 weeks (around 6/26/2017) for medicare wellness. Your Appointments 6/21/2017  8:40 AM  
ESTABLISHED PATIENT with Nicolette Penaloza MD  
Arthritis and [address] ([facility]) Appt Note: 3 month f/up; r/s from 06/06/17  
 [address] [facility] [address]  
[phone]  
  
   
 [address] [id number] Upcoming Health Maintenance Date Due MICROALBUMIN Q1 12/29/1963 EYE EXAM RETINAL OR DILATED Q1 12/29/1963 MEDICARE YEARLY EXAM 12/29/1971 Pneumococcal 19-64 Medium Risk (1 of 1 - PPSV23) 12/29/1972 DTaP/Tdap/Td series (1 - Tdap) 12/29/1974 FOBT Q 1 YEAR AGE 50-75 12/29/2003 BREAST CANCER SCRN MAMMOGRAM 8/19/2016 INFLUENZA AGE 9 TO ADULT 8/1/2017 HEMOGLOBIN A1C Q6M 9/6/2017 LIPID PANEL Q1 3/6/2018 FOOT EXAM Q1 6/12/2018 PAP AKA CERVICAL CYTOLOGY 8/14/2020 Allergies as of 6/12/2017  Review Complete On: 6/12/2017 By: Quoc Valentine MD  
 No Known Allergies Current Immunizations  Reviewed on 5/8/2017 Name Date Influenza Vaccine 9/6/2016 Influenza Vaccine [facility]) 11/19/2015  2:49 PM  
  
 Not reviewed this visit You Were Diagnosed With   
  
 Codes Comments Screening for breast cancer    -  Primary ICD-10-CM: Z12.39 
ICD-9-CM: V76.10   
 MENDEZ (dyspnea on exertion)     ICD-10-CM: R06.09 
ICD-9-CM: 786.09 Type 2 diabetes mellitus without complication, without long-term current use of insulin (HCC)     ICD-10-CM: E11.9 ICD-9-CM: 250.00 Essential hypertension     ICD-10-CM: I10 
ICD-9-CM: 401.9 Vitals BP Pulse Temp Resp Height(growth percentile) Weight(growth percentile) 116/77 95 98.9 °F (37.2 °C) (Oral) 18 5' 4.5\" (1.638 m) 272 lb (123.4 kg) SpO2 BMI OB Status Smoking Status 95% 45.97 kg/m2 Postmenopausal Former Smoker Vitals History BMI and BSA Data Body Mass Index Body Surface Area 45.97 kg/m 2 2.37 m 2 Preferred Pharmacy Pharmacy Name Phone [facility] [phone] Your Updated Medication List  
  
   
This list is accurate as of: 6/12/17  1:59 PM.  Always use your most recent med list.  
  
  
  
  
 albuterol-ipratropium 2.5 mg-0.5 mg/3 ml Nebu Commonly known as:  DUO-NEB  
every six (6) hours as needed. alendronate 70 mg tablet Commonly known as:  FOSAMAX Take 70 mg by mouth every seven (7) days. atorvastatin 40 mg tablet Commonly known as:  LIPITOR  
TAKE 1 TABLET BY MOUTH DAILY  
  
 buPROPion  mg XL tablet Commonly known as:  WELLBUTRIN XL  
TAKE 1 TABLET BY MOUTH EVERY MORNING  
  
 CALCIUM 600 + D 600-125 mg-unit Tab Generic drug:  calcium-cholecalciferol (d3) Take  by mouth daily. carvedilol 25 mg tablet Commonly known as:  COREG  
TAKE 1 TABLET BY MOUTH TWICE DAILY  
  
 celecoxib 200 mg capsule Commonly known as:  CELEBREX Take 1 Cap by mouth two (2) times a day for 90 days. dulaglutide 0.75 mg/0.5 mL sub-q pen Commonly known as:  TRULICITY  
0.5 mL by SubCUTAneous route every seven (7) days. DULoxetine 40 mg Cpdr  
TK 1 C PO QD  
  
 fluticasone-salmeterol 250-50 mcg/dose diskus inhaler Commonly known as:  ADVAIR Take 1 Puff by inhalation every twelve (12) hours. gabapentin 300 mg capsule Commonly known as:  NEURONTIN  
TAKE 1 CAPSULE BY MOUTH THREE TIMES DAILY  
  
 levothyroxine 100 mcg tablet Commonly known as:  SYNTHROID  
TAKE 1 TABLET BY MOUTH DAILY predniSONE 5 mg tablet Commonly known as:  Edilma Duane  
 Take 3 Tabs by mouth daily. PROAIR HFA 90 mcg/actuation inhaler Generic drug:  albuterol Take 2 Puffs by inhalation every four (4) hours as needed. TOCILIZUMAB IV  
by IntraVENous route every thirty (30) days. Not taking  
  
 traMADol 50 mg tablet Commonly known as:  ULTRAM  
TAKE 1 TABLET BY MOUTH TWICE DAILY AS NEEDED. DAILY MAX  MGS  
  
 valsartan 80 mg tablet Commonly known as:  DIOVAN  
TAKE 1 TABLET BY MOUTH DAILY  
  
 VITAMIN D2 50,000 unit capsule Generic drug:  ergocalciferol Take 50,000 Units by mouth every seven (7) days. For 8 weeks Prescriptions Sent to Pharmacy Refills PROAIR HFA 90 mcg/actuation inhaler 2 Sig: Take 2 Puffs by inhalation every four (4) hours as needed. Class: Normal  
 Pharmacy: [facility] [address] [facility] [employer] Ph #: [phone] Route: Inhalation  
 dulaglutide (TRULICITY) 9.06 LS/8.9 mL sub-q pen 3 Si.5 mL by SubCUTAneous route every seven (7) days. Class: Normal  
 Pharmacy: [facility] [address] [facility] [employer] Ph #: [phone] Route: SubCUTAneous  
 fluticasone-salmeterol (ADVAIR) 250-50 mcg/dose diskus inhaler 3 Sig: Take 1 Puff by inhalation every twelve (12) hours. Class: Normal  
 Pharmacy: [facility] [address] [facility] [employer] Ph #: [phone] Route: Inhalation We Performed the Following HEMOGLOBIN A1C WITH EAG [66453 CPT(R)] METABOLIC PANEL, COMPREHENSIVE [62417 CPT(R)] MICROALBUMIN, UR, RAND W/ MICROALBUMIN/CREA RATIO [id number] CPT(R)] Follow-up Instructions Return in about 2 weeks (around 2017) for medicare wellness. To-Do List   
 2017 Imaging:  [facility] BI SCREENING INCL CAD   
  
 2017 PFT: PULMONARY FUNCTION TEST Introducing [facility]! Dear Constantino Tran: Thank you for requesting a [facility] account. Our records indicate that you have previously registered for a [facility] account but its currently inactive. Please call our [facility] support line at [phone]. Additional Information If you have questions, please visit the Frequently Asked Questions section of the [facility] website at [email or web address] Remember, [facility] is NOT to be used for urgent needs. For medical emergencies, dial 911. Now available from your iPhone and Android! Please provide this summary of care documentation to your next provider. Your primary care clinician is listed as [address] [place] [address]. If you have any questions after today's visit, please call [phone].

## 2017-06-12 NOTE — PROGRESS NOTES
Chief Complaint   Patient presents with    New Patient    Weight Management    Shortness of Breath    Medication Refill     she is a 59y.o. year old female who presents for evalution. She fell yesterday, tripped on her granson. She has pain in the right hip and has been able to walk. She has two hip replacements and rods in her back  She c/o getting sob when walking from the car to the office. She has had a cardiac eval and that was negative. She was a smoker but quit 3 yrs ago. She has taken a lot of prednisone for pain. The prednisone has caused skin changes    Reviewed PmHx, RxHx, FmHx, SocHx, AllgHx and updated and dated in the chart.     Patient Active Problem List    Diagnosis    Morbid obesity with BMI of 45.0-49.9, adult ([address]    Vitamin D deficiency    Osteopenia    Severe episode of recurrent major depressive disorder, without psychotic features ([address]    Controlled type 2 diabetes mellitus with complication, without long-term current use of insulin ([address]    Long term (current) use of systemic steroids    Long term current use of immunosuppressive drug    Essential hypertension    Hypercholesterolemia    Generalized hyperhidrosis    Seropositive rheumatoid arthritis of multiple sites ([address]    Easy bruising    Acquired hypothyroidism    Postmenopausal depression    Lumbar stenosis with neurogenic claudication    Arthritis of knee       Nurse notes were reviewed and copied and are correct  Review of Systems - negative except as listed above in the HPI    Objective:     Vitals:    06/12/17 1307   BP: 116/77   Pulse: 95   Resp: 18   Temp: 98.9 °F (37.2 °C)   TempSrc: Oral   SpO2: 95%   Weight: 272 lb (123.4 kg)   Height: 5' 4.5\" (1.638 m)       Physical Examination: General appearance - alert, well appearing, and in no distress  Mental status - alert, oriented to person, place, and time  Neck - supple, no significant adenopathy  Lymphatics - no palpable lymphadenopathy, no hepatosplenomegaly  Chest - clear to auscultation, no wheezes, rales or rhonchi, symmetric air entry  Heart - normal rate, regular rhythm, normal S1, S2, no murmurs, rubs, clicks or gallops  Musculoskeletal - no joint tenderness, deformity or swelling  Extremities - peripheral pulses normal, no pedal edema, no clubbing or cyanosis  Skin - normal coloration and turgor, no rashes, no suspicious skin lesions noted      Sensory exam of the foot is normal, tested with the monofilament. Good pulses, no lesions or ulcers, good peripheral pulses. Assessment/ Plan:   Red Benson was seen today for new patient, weight management, shortness of breath and medication refill. Diagnoses and all orders for this visit:    MENDEZ (dyspnea on exertion)  -     PROAIR HFA 90 mcg/actuation inhaler; Take 2 Puffs by inhalation every four (4) hours as needed. -     PULMONARY FUNCTION TEST; Future  -     fluticasone-salmeterol (ADVAIR) 250-50 mcg/dose diskus inhaler; Take 1 Puff by inhalation every twelve (12) hours. I believe her MENDEZ is copd. I am ordering PFT to confirm. I will go ahead and start her on inhaled steroids. She takes po steroids also  Type 2 diabetes mellitus without complication, without long-term current use of insulin (HCC)  -     MICROALBUMIN, UR, RAND W/ MICROALBUMIN/CREA RATIO  -     HEMOGLOBIN A1C WITH EAG  -     METABOLIC PANEL, COMPREHENSIVE  -     dulaglutide (TRULICITY) 1.21 SF/5.5 mL sub-q pen; 0.5 mL by SubCUTAneous route every seven (7) days. If the trulicity is denied we   Screening for breast cancer  -     [facility] MAMMO BI SCREENING INCL CAD; Future    Morbid obesity with BMI of 45.0-49.9, adult [facility])  She will have a lot of trouble losing while taking prednisone and with RA she will probably be on and off this  Essential hypertension  Great control     Follow-up Disposition:  Return in about 2 weeks (around 6/26/2017) for medicare wellness. ICD-10-CM ICD-9-CM    1.  MENDEZ (dyspnea on exertion) R06.09 786.09 PROAIR HFA 90 mcg/actuation inhaler      PULMONARY FUNCTION TEST      fluticasone-salmeterol (ADVAIR) 250-50 mcg/dose diskus inhaler   2. Type 2 diabetes mellitus without complication, without long-term current use of insulin (HCC) E11.9 250.00 MICROALBUMIN, UR, RAND W/ MICROALBUMIN/CREA RATIO      HEMOGLOBIN A1C WITH EAG      METABOLIC PANEL, COMPREHENSIVE      dulaglutide (TRULICITY) 1.76 QN/6.6 mL sub-q pen   3. Screening for breast cancer Z12.39 V76.10 [facility] BI SCREENING INCL CAD   4. Morbid obesity with BMI of 45.0-49.9, adult ([facility]) E66.01 278.01     Z68.42 V85.42    5. Essential hypertension I10 401.9        I have discussed the diagnosis with the patient and the intended plan as seen in the above orders. The patient has received an after-visit summary and questions were answered concerning future plans. Medication Side Effects and Warnings were discussed with patient: yes  Patient Labs were reviewed and or requested: yes  Patient Past Records were reviewed and or requested: yes        There are no Patient Instructions on file for this visit.     The patient verbalizes understanding and agrees with the plan of care        Patient has the advanced directives booklet to review

## 2017-06-12 NOTE — PROGRESS NOTES
1. Have you been to the ER, urgent care clinic since your last visit? Hospitalized since your last visit? No    2. Have you seen or consulted any other health care providers outside of the [address] [place] [address] since your last visit? Include any pap smears or colon screening.  No     Chief Complaint   Patient presents with    New Patient

## 2017-06-13 ENCOUNTER — TELEPHONE (OUTPATIENT)
Dept: FAMILY MEDICINE CLINIC | Age: 62
End: 2017-06-13

## 2017-06-13 LAB
ALBUMIN SERPL-MCNC: 4.4 G/DL (ref 3.6–4.8)
ALBUMIN/GLOB SERPL: 1.7 {RATIO} (ref 1.2–2.2)
ALP SERPL-CCNC: 83 IU/L (ref 39–117)
ALT SERPL-CCNC: 99 IU/L (ref 0–32)
AST SERPL-CCNC: 52 IU/L (ref 0–40)
BILIRUB SERPL-MCNC: 0.4 MG/DL (ref 0–1.2)
BUN SERPL-MCNC: 21 MG/DL (ref 8–27)
BUN/CREAT SERPL: 24 (ref 12–28)
CALCIUM SERPL-MCNC: 9.8 MG/DL (ref 8.7–10.3)
CHLORIDE SERPL-SCNC: 99 MMOL/L (ref 96–106)
CO2 SERPL-SCNC: 20 MMOL/L (ref 18–29)
CREAT SERPL-MCNC: 0.89 MG/DL (ref 0.57–1)
CREAT UR-MCNC: NORMAL MG/DL
EST. AVERAGE GLUCOSE BLD GHB EST-MCNC: 163 MG/DL
GLOBULIN SER CALC-MCNC: 2.6 G/DL (ref 1.5–4.5)
GLUCOSE SERPL-MCNC: 117 MG/DL (ref 65–99)
HBA1C MFR BLD: 7.3 % (ref 4.8–5.6)
MICROALBUMIN UR-MCNC: NORMAL
POTASSIUM SERPL-SCNC: 5.5 MMOL/L (ref 3.5–5.2)
PROT SERPL-MCNC: 7 G/DL (ref 6–8.5)
SODIUM SERPL-SCNC: 142 MMOL/L (ref 134–144)

## 2017-06-13 NOTE — TELEPHONE ENCOUNTER
Call from Call Center:      Pt would like to know if the coupon Trulicity has been printed because the medication is $95.00 and is still waiting at [facility].  Pt can be reached at [phone].                  No one in the office spoke to the patient; thank you

## 2017-06-14 NOTE — PROGRESS NOTES
I want to repeat the bmp.  The potassium was above normal.   The liver showed improvement  The blood sugar control has not changed

## 2017-06-21 ENCOUNTER — OFFICE VISIT (OUTPATIENT)
Dept: RHEUMATOLOGY | Age: 62
End: 2017-06-21

## 2017-06-21 VITALS
HEIGHT: 64 IN | WEIGHT: 272 LBS | RESPIRATION RATE: 18 BRPM | DIASTOLIC BLOOD PRESSURE: 92 MMHG | TEMPERATURE: 98.2 F | BODY MASS INDEX: 46.44 KG/M2 | HEART RATE: 94 BPM | SYSTOLIC BLOOD PRESSURE: 144 MMHG | OXYGEN SATURATION: 93 %

## 2017-06-21 DIAGNOSIS — R79.89 LFT ELEVATION: ICD-10-CM

## 2017-06-21 DIAGNOSIS — M85.89 OSTEOPENIA OF MULTIPLE SITES: ICD-10-CM

## 2017-06-21 DIAGNOSIS — E55.9 VITAMIN D DEFICIENCY: ICD-10-CM

## 2017-06-21 DIAGNOSIS — Z79.899 LONG TERM CURRENT USE OF IMMUNOSUPPRESSIVE DRUG: ICD-10-CM

## 2017-06-21 DIAGNOSIS — F33.2 SEVERE EPISODE OF RECURRENT MAJOR DEPRESSIVE DISORDER, WITHOUT PSYCHOTIC FEATURES (HCC): ICD-10-CM

## 2017-06-21 DIAGNOSIS — R61 GENERALIZED HYPERHIDROSIS: ICD-10-CM

## 2017-06-21 DIAGNOSIS — M05.79 SEROPOSITIVE RHEUMATOID ARTHRITIS OF MULTIPLE SITES (HCC): Primary | ICD-10-CM

## 2017-06-21 DIAGNOSIS — R23.3 EASY BRUISING: ICD-10-CM

## 2017-06-21 DIAGNOSIS — Z79.52 LONG TERM (CURRENT) USE OF SYSTEMIC STEROIDS: ICD-10-CM

## 2017-06-21 DIAGNOSIS — E66.01 MORBID OBESITY WITH BMI OF 45.0-49.9, ADULT (HCC): ICD-10-CM

## 2017-06-21 RX ORDER — METHOTREXATE 2.5 MG/1
20 TABLET ORAL
COMMUNITY
End: 2017-06-21 | Stop reason: SDUPTHER

## 2017-06-21 RX ORDER — FOLIC ACID 1 MG/1
TABLET ORAL DAILY
COMMUNITY
End: 2018-01-24 | Stop reason: SDUPTHER

## 2017-06-21 RX ORDER — METHOTREXATE 2.5 MG/1
20 TABLET ORAL
Qty: 96 TAB | Refills: 0 | Status: SHIPPED | OUTPATIENT
Start: 2017-06-24 | End: 2017-09-22

## 2017-06-21 RX ORDER — ERGOCALCIFEROL 1.25 MG/1
50000 CAPSULE ORAL
Qty: 12 CAP | Refills: 3 | Status: SHIPPED | OUTPATIENT
Start: 2017-06-21 | End: 2018-06-21

## 2017-06-21 RX ORDER — TRIAMCINOLONE ACETONIDE 40 MG/ML
80 INJECTION, SUSPENSION INTRA-ARTICULAR; INTRAMUSCULAR ONCE
Qty: 1 ML | Refills: 0
Start: 2017-06-21 | End: 2017-06-21

## 2017-06-21 NOTE — PROGRESS NOTES
REASON FOR VISIT    This is a follow-up visit for Ms. Johnson for Seropositive Rheumatoid Arthritis. Inflammatory arthritis phenotype includes:  Anti-CCP positive: yes (73)  Rheumatoid factor positive: yes (86.2)  Erosive disease: no  Extra-articular manifestations include: none    Immunosuppression Screening (10/13/2016):   Quantiferon TB: negative  PPD:  Not performed  Hepatitis B: negative  Hepatitis C: negative    Therapy History includes:    Current DMARD therapy includes: methotrexate 20 mg every Saturday, Actemra infusion (12/16/2016)   Prior DMARD therapy includes: methotrexate, Arava, Enbrel, Humira, Orencia  The following DMARDs have been ineffective: methotrexate (6 months), Onencia SQ (4 months)  The following DMARDs were stopped because of side effects: Arava (hepatotoxicity), Humira (elbow infection from MRSA s/p 3 surgeries)     Osteoporosis Historical Synopsis     Height loss since age 25 (at least two inches): 2.5  Fracture history includes: no  Family history of hip fracture: no  Fall Risk: yes     Daily calcium intake is 1,000 mg  Daily vitamin D intake is 50,000 per week     Smoking history: yes (former smoker of 22 years)  Alcohol consumption: no  Prednisone history: yes (25 mg for more than several years)     Exercise: yes     Previous work-up for osteoporosis includes the following:  DEXA Scan: 2015  Vitamin 25OH D level: None  Calcium level: 9.8 mg/dL  PTH: None     Therapy History includes:     Current osteoporosis therapy includes: alendronate 70 mg weekly  Prior osteoporosis therapy includes: none  The following osteoporosis have been ineffective: none  The following osteoporosis were stopped because of side effects: none    Immunizations:   Immunization History   Administered Date(s) Administered    Influenza Vaccine 09/06/2016    Influenza Vaccine (Quad) 11/19/2015       Active problems include:    Patient Active Problem List   Diagnosis Code    Arthritis of knee M17.10    Lumbar stenosis with neurogenic claudication M48.06    Seropositive rheumatoid arthritis of multiple sites ([facility] [address] M05.79    Easy bruising R23.8    Acquired hypothyroidism E03.9    Postmenopausal depression F32.89    Hypercholesterolemia E78.00    Generalized hyperhidrosis R61    Long term (current) use of systemic steroids Z79.52    Long term current use of immunosuppressive drug Z79.899    Essential hypertension I10    Controlled type 2 diabetes mellitus with complication, without long-term current use of insulin ([facility]) E11.8    Morbid obesity with BMI of 45.0-49.9, adult ([facility]) E66.01, Z68.42    Vitamin D deficiency E55.9    Osteopenia M85.80    Severe episode of recurrent major depressive disorder, without psychotic features ([facility]) F33.2       HISTORY OF PRESENT ILLNESS    Ms. Bri Potter returns for a follow-up visit. On her last visit, I continued her methotrexate to 20 mg weekly Saturday and Actemra infusions (last on 5/08/2017). She continues Fosamax. Her liver enzymes were elevated so her Actemra infusion on 6/07 was missed. She also reports having pruritis at that time which improved. Today, she has been on prednisone 10 mg daily without relief. She is no longer noticing improving in Actemra. She has breakthrough after 2 weeks. Her wrist is throbbing and swelling and keeping her from sleeping. She now has pain in her arms and shoulder. She is developing a knot on her left hand. She has stiffness lasting an hour. She is worse. Last toxicity monitoring by blood work was done on 6/05/2017 and did not reveal any significant adverse effects, except  and AST 59. On 6/13/2017, AST was 52 and ALT 99. Most recent inflammatory markers from 6/05/2017 revealed a ESR 1 mm/hr (previously 27 mm/hr) and CRP 0.29 mg/L (previously 3.8 mg/L). The patient has not had any interval hospital admissions or surgeries but has had infections. Aubrey Camacho     REVIEW OF SYSTEMS    A comprehensive review of systems was performed and pertinent results are documented in the HPI, review of systems is otherwise non-contributory. PAST MEDICAL HISTORY    She has a past medical history of Anxiety; Arrhythmia; Arthritis; Arthritis of knee (1/30/2012); Autoimmune disease ([facility] [address] Bronchitis; Chronic pain; Depression; Dyslipidemia; Morbid obesity ([facility] [address] Nausea & vomiting; Other screening mammogram (8/19/15); Overweight; Papanicolaou smear for cervical cancer screening (8/14/15); Rheumatoid aortitis ([facility] [address] and SVT (supraventricular tachycardia) ([facility] [address] (1993). FAMILY HISTORY    Her family history includes Breast Cancer in an other family member; Diabetes in her brother and mother; Heart Disease in her brother and father. SOCIAL HISTORY    She reports that she quit smoking about 4 years ago. She has a 10.00 pack-year smoking history. She has never used smokeless tobacco. She reports that she does not drink alcohol or use illicit drugs. MEDICATIONS    Current Outpatient Prescriptions   Medication Sig Dispense Refill    folic acid (FOLVITE) 1 mg tablet Take  by mouth daily.  [START ON 6/24/2017] methotrexate (RHEUMATREX) 2.5 mg tablet Take 8 Tabs by mouth Every Saturday for 90 days. 96 Tab 0    VITAMIN D2 50,000 unit capsule Take 1 Cap by mouth every seven (7) days. 12 Cap 3    triamcinolone acetonide (KENALOG) 40 mg/mL injection 2 mL by IntraMUSCular route once for 1 dose. 1 mL 0    PROAIR HFA 90 mcg/actuation inhaler Take 2 Puffs by inhalation every four (4) hours as needed. 1 Inhaler 2    traMADol (ULTRAM) 50 mg tablet TAKE 1 TABLET BY MOUTH TWICE DAILY AS NEEDED. DAILY MAX  MGS 30 Tab 0    valsartan (DIOVAN) 80 mg tablet TAKE 1 TABLET BY MOUTH DAILY 90 Tab 3    carvedilol (COREG) 25 mg tablet TAKE 1 TABLET BY MOUTH TWICE DAILY 180 Tab 0    predniSONE (DELTASONE) 5 mg tablet Take 3 Tabs by mouth daily.  (Patient taking differently: Take 10 mg by mouth daily.) 90 Tab 0    atorvastatin (LIPITOR) 40 mg tablet TAKE 1 TABLET BY MOUTH DAILY 90 Tab 3    levothyroxine (SYNTHROID) 100 mcg tablet TAKE 1 TABLET BY MOUTH DAILY 90 Tab 3    buPROPion XL (WELLBUTRIN XL) 300 mg XL tablet TAKE 1 TABLET BY MOUTH EVERY MORNING 90 Tab 1    gabapentin (NEURONTIN) 300 mg capsule TAKE 1 CAPSULE BY MOUTH THREE TIMES DAILY 270 Cap 1    DULoxetine 40 mg cpDR TK 1 C PO QD  1    alendronate (FOSAMAX) 70 mg tablet Take 70 mg by mouth every seven (7) days. 3    calcium-cholecalciferol, d3, (CALCIUM 600 + D) 600-125 mg-unit tab Take  by mouth daily. ALLERGIES    No Known Allergies    PHYSICAL EXAMINATION    Visit Vitals    BP (!) 144/92 (BP 1 Location: Right arm, BP Patient Position: Sitting)    Pulse 94    Temp 98.2 °F (36.8 °C)    Resp 18    Ht 5' 4\" (1.626 m)    Wt 272 lb (123.4 kg)    SpO2 93%    BMI 46.69 kg/m2     Body mass index is 46.69 kg/(m^2). General: Patient is alert, oriented x 3, not in acute distress. HEENT:   Sclerae are not injected and appear moist.  Oral mucous membranes are moist, there are no ulcers present. There is no alopecia. Neck is supple. Cardiovascular:  Heart is regular rate and rhythm, no murmurs. Chest:  Lungs are clear to auscultation bilaterally. No rhonchi, wheezes, or crackles. Abdomen:  Obese. Extremities:  Free of clubbing, cyanosis, edema    Neurological exam:  No focal sensory deficits, muscle strength is full in upper and lower extremities     Skin exam:  There are no alopecia, no discoid lesions, no active Raynaud's, no livedo reticularis, no periungual erythema. Multiple small skin tears and bruising    Musculoskeletal exam:  A comprehensive musculoskeletal exam was performed for all joints of each upper and lower extremity and assessed for swelling, tenderness and range of motion.  Positive results are documented as below:    Bilateral CMC crepitus  Bilateral wrist synovitis (right more than left)  Left Elbow flexion contracture  Decreased of ROM of wrists  Right ankle tenderness    Joint Count 6/21/2017 3/6/2017 1/17/2017 11/3/2016 10/13/2016   Patient pain (0-100) 85 70 35 80 85   MHAQ 1.75 1.5 1.5 1.38 1.63   Left shoulder - Tender 1 - - - -   Left elbow - Tender 1 - - - 1   Left elbow - Swollen 1 - - - 1   Left wrist- Tender - - - - 1   Left wrist- Swollen 1 - - - 1   Left 1st MCP - Tender 1 - - - 1   Left 1st MCP - Swollen 1 - - - 1   Left 2nd MCP - Tender 1 - - - 1   Left 2nd MCP - Swollen 1 - - - 1   Left 3rd MCP - Tender 1 - - - 1   Left 3rd MCP - Swollen 1 - - - 1   Left 4th MCP - Tender 1 - - - 1   Left 4th MCP - Swollen 1 - - - 1   Left 5th MCP - Tender 1 - - - 1   Left 5th MCP - Swollen - - - - 1   Left thumb IP - Tender - - - - 1   Left thumb IP - Swollen - - - - 1   Left 2nd PIP - Tender 1 - - - 1   Left 2nd PIP - Swollen 1 - - - 1   Left 3rd PIP - Tender 1 - - - 1   Left 3rd PIP - Swollen 1 - - - 1   Left 4th PIP - Tender 1 - - - 1   Left 4th PIP - Swollen 1 - - - -   Left 5th PIP - Tender - - - - 1   Right shoulder - Tender 1 - - - -   Right elbow - Tender 1 - - - 1   Right elbow - Swollen - - - - 1   Right wrist- Tender 1 - - - 1   Right wrist- Swollen 1 - - - 1   Right 1st MCP - Tender - - - - 1   Right 1st MCP - Swollen - - - - 1   Right 2nd MCP - Tender 1 - - - 1   Right 2nd MCP - Swollen - - - - 1   Right 3rd MCP - Tender 1 - - - 1   Right 3rd MCP - Swollen 1 - - - 1   Right 4th MCP - Tender 1 - - - 1   Right 4th MCP - Swollen - - - - 1   Right 5th MCP - Tender 1 - - - 1   Right 5th MCP - Swollen - - - - 1   Right thumb IP - Tender - - - - 1   Right thumb IP - Swollen - - - - 1   Right 2nd PIP - Tender 1 - - - 1   Right 2nd PIP - Swollen 1 - - - 1   Right 3rd PIP - Tender 1 - - - 1   Right 3rd PIP - Swollen 1 - - - 1   Right 4th PIP - Tender 1 - - - 1   Right 4th PIP - Swollen 1 - - - 1   Right 5th PIP - Tender - - - - 1   Right 5th PIP - Swollen - - - - 1   Tender Joint Count (Total) 20 - - - -   Swollen Joint Count (Total) 14 - - - -   Physician Assessment (0-10) 6 - - - 9   Patient Assessment (0-10) 9 7.5 6 7 9   CDAI Total (calculated) 49 - - - -       DATA REVIEW    Laboratory     The following laboratory results were reviewed and discussed with the patient:    Office Visit on 06/12/2017   Component Date Value    Creatinine, urine 06/12/2017 CANCELED     Microalbumin, urine 06/12/2017 CANCELED     Hemoglobin A1c 06/12/2017 7.3*    Estimated average glucose 06/12/2017 163     Glucose 06/12/2017 117*    BUN 06/12/2017 21     Creatinine 06/12/2017 0.89     GFR est non-AA 06/12/2017 69     GFR est AA 06/12/2017 80     BUN/Creatinine ratio 06/12/2017 24     Sodium 06/12/2017 142     Potassium 06/12/2017 5.5*    Chloride 06/12/2017 99     CO2 06/12/2017 20     Calcium 06/12/2017 9.8     Protein, total 06/12/2017 7.0     Albumin 06/12/2017 4.4     GLOBULIN, TOTAL 06/12/2017 2.6     A-G Ratio 06/12/2017 1.7     Bilirubin, total 06/12/2017 0.4     Alk. phosphatase 06/12/2017 83     AST (SGOT) 06/12/2017 52*    ALT (SGPT) 06/12/2017 99*   Hospital Outpatient Visit on 06/05/2017   Component Date Value    WBC 06/05/2017 7.4     RBC 06/05/2017 4.59     HGB 06/05/2017 14.7     HCT 06/05/2017 44.5     MCV 06/05/2017 96.9     MCH 06/05/2017 32.0     MCHC 06/05/2017 33.0     RDW 06/05/2017 14.1     PLATELET 48/31/7411 571     NEUTROPHILS 06/05/2017 71     LYMPHOCYTES 06/05/2017 16     MONOCYTES 06/05/2017 7     EOSINOPHILS 06/05/2017 5     BASOPHILS 06/05/2017 1     ABS. NEUTROPHILS 06/05/2017 5.3     ABS. LYMPHOCYTES 06/05/2017 1.2     ABS. MONOCYTES 06/05/2017 0.5     ABS. EOSINOPHILS 06/05/2017 0.3     ABS.  BASOPHILS 06/05/2017 0.0     Sodium 06/05/2017 141     Potassium 06/05/2017 4.6     Chloride 06/05/2017 105     CO2 06/05/2017 26     Anion gap 06/05/2017 10     Glucose 06/05/2017 136*    BUN 06/05/2017 15     Creatinine 06/05/2017 0.81     BUN/Creatinine ratio 06/05/2017 19     GFR est AA 06/05/2017 >60     GFR est non-AA 06/05/2017 >60     Calcium 06/05/2017 8.7     Bilirubin, total 06/05/2017 0.4     ALT (SGPT) 06/05/2017 120*    AST (SGOT) 06/05/2017 59*    Alk. phosphatase 06/05/2017 95     Protein, total 06/05/2017 6.8     Albumin 06/05/2017 3.8     Globulin 06/05/2017 3.0     A-G Ratio 06/05/2017 1.3     Sed rate, automated 06/05/2017 1     C-Reactive protein 06/05/2017 <0.29    Office Visit on 03/06/2017   Component Date Value    WBC 03/06/2017 8.6     RBC 03/06/2017 4.16     HGB 03/06/2017 13.4     HCT 03/06/2017 40.5     MCV 03/06/2017 97     MCH 03/06/2017 32.2     MCHC 03/06/2017 33.1     RDW 03/06/2017 14.1     PLATELET 92/25/6080 433     NEUTROPHILS 03/06/2017 67     Lymphocytes 03/06/2017 20     MONOCYTES 03/06/2017 7     EOSINOPHILS 03/06/2017 4     BASOPHILS 03/06/2017 1     ABS. NEUTROPHILS 03/06/2017 5.8     Abs Lymphocytes 03/06/2017 1.7     ABS. MONOCYTES 03/06/2017 0.6     ABS. EOSINOPHILS 03/06/2017 0.4     ABS. BASOPHILS 03/06/2017 0.1     IMMATURE GRANULOCYTES 03/06/2017 1     ABS. IMM. GRANS. 03/06/2017 0.1     Glucose 03/06/2017 117*    BUN 03/06/2017 16     Creatinine 03/06/2017 0.60     GFR est non-AA 03/06/2017 97     GFR est AA 03/06/2017 112     BUN/Creatinine ratio 03/06/2017 27*    Sodium 03/06/2017 141     Potassium 03/06/2017 4.5     Chloride 03/06/2017 100     CO2 03/06/2017 24     Calcium 03/06/2017 9.3     Protein, total 03/06/2017 6.6     Albumin 03/06/2017 5.0*    GLOBULIN, TOTAL 03/06/2017 1.6     A-G Ratio 03/06/2017 3.1*    Bilirubin, total 03/06/2017 0.4     Alk.  phosphatase 03/06/2017 95     AST (SGOT) 03/06/2017 25     ALT (SGPT) 03/06/2017 37*    C-Reactive Protein, Qt 03/06/2017 12.5*    Sed rate (ESR) 03/06/2017 22     Uric acid 03/06/2017 3.9     VITAMIN D, 25-HYDROXY 03/06/2017 27.9*    Cholesterol, total 03/06/2017 170     Triglyceride 03/06/2017 162*    HDL Cholesterol 03/06/2017 62     VLDL, calculated 03/06/2017 32     LDL, calculated 03/06/2017 76     TSH 03/06/2017 1.920     Hemoglobin A1c 03/06/2017 7.3*       Imaging    Musculoskeletal Ultrasound    None    Radiographs    Left Elbow 10/13/2016: mild to moderate diffuse joint space loss with moderate-sized marginal osteophytes. An elbow effusion is evident. Bones are moderately osteopenic. No acute fracture or dislocation is shown. Bilateral Hand 10/13/2016: moderate diffuse osteopenia. Bilateral carpal soft tissue swelling is shown which is greater on the right. Severe joint space loss is demonstrated in the radiocarpal joints bilaterally with scapholunate advanced collapse of the right. Right capitate abuts the distal radius. Incongruence on the left is shown between the scaphoid and lunate with mild interspace widening indicating early scapholunate advanced collapse. Small right bilateral distal radioulnar joint osteophytes are shown. A 2 to 3 mm loose body is shown on the right. There is moderate bilateral first CMC osteoarthrosis with lateral subluxation and tiny marginal osteophytes. Mild to moderate uniform joint space narrowing is shown throughout the metacarpophalangeal joints with only minimal bilateral second and left third MCP joint osteophyte formation. Mild MCP level bilateral soft tissue swelling is present. Digital assessment shows mild joint space narrowing of the right second and third and the left third DIP joints. Tiny osteophytes are shown at these articulations as well as a small periosteal calcification along the medial aspect of the right ring finger middle phalangeal head. No definite erosions are identified nor is there soft tissue calcifications indicative of calcium pyrophosphate dihydrate deposition. Bilateral Foot 10/13/2016: chronic appearing fracture at the base of the right fifth metatarsal bone with fracture gap widening measuring up to 6 mm and mild adjacent soft tissue swelling.  There is no other evidence for acute or chronic fracture or dislocation. Mild bilateral first metatarsophalangeal joint osteoarthrosis is demonstrated with tiny marginal osteophytes. Mild osseous hypertrophy of the medial first metatarsal heads is shown bilaterally with nonmarginal erosions with sclerotic margins and mild overlying soft tissue swelling. There is also mild valgus at posterior first MTP joints would lateral subluxation of the hallux sesamoid bones. Soft tissue swelling also overlies the fifth metatarsal heads bilaterally. On the right, there are nonmarginal erosions measuring up to 6 mm in size, also sharply demarcated with sclerotic margins. Mild joint space loss of the left fifth metatarsophalangeal joint is demonstrated with tiny marginal osteophytes. Several discrete nonmarginal erosions are shown medially and laterally in the third metatarsal head with mild MTP joint space loss on the right. First through third tarsometatarsal joint space narrowing is present bilaterally with subcortical demineralization at the right third metatarsal base. Mild overlying dorsal soft tissue swelling is shown. The patient is status post resection of the left second metatarsal head. Extensive atherosclerotic calcifications are shown. Pelvic 1/07/2016: bilateral hip prostheses are partially visualized and in satisfactory alignment. There is no acute fracture. Lumbosacral fusion hardware is intact. 1/07/2016:    Lumbar 7/11/2014: recent laminectomy and instrumentation from , with placement of rods and pedicle screws. The hardware appears intact and hardware position is appropriate. Degenerative disc disease is present from L2-S1. There is grade 1 spondylolisthesis at L4-5 which is probably chronic. There is no evidence of vertebral compression fracture. CT Imaging    CTA chest with and without contrast 7/10/2014: no mediastinal, axillary or hilar lymphadenopathy. There is no pleural or pericardial effusion. The aorta is normal in course and caliber. The proximal pulmonary arteries are without evidence of filling defects, the caliber of the pulmonary arteries is also normal. The pulmonary parenchyma is unremarkable. No lytic or blastic lesions are identified. Subcutaneous edema in the soft tissues. Hepatic steatosis. No aortic aneurysm. No pulmonary embolism. The remainder of the upper abdomen visualized is unremarkable    MR Imaging    None    DXA     DXA 6/25/2015: (Excluded sites: prosthetic hips, L3 and L4 for hardware and both hips for hardware) lumbar spine L1-L2 T score -0.2 (BMD 1.150 g/cm2). PROCEDURE     Indications:   Symptom relief from Rheumatoid Arthritis flare. (06/21/17)     Procedure:   After consent was obtained, using sterile technique the right gluteus was prepped using alcohol. Local anesthetic used: none. The gluteus was entered and 0 ml's of fluid was withdrawn. Kenalog 80 mg was injected into the gluteus and the needle withdrawn. The procedure was well tolerated. The patient was asked to watch for fever, or increased swelling or persistent pain in the joint. Call or return to clinic prn if such symptoms occur or there is failure to improve as anticipated. ASSESSMENT AND PLAN    This is a follow-up visit for Ms. Johnson. 1) Seropositive Rheumatoid Arthritis. She was tolerating methotrexate 20 mg every Saturday and Actemra infusions but she is not clinically improving. She has breakthrough with Actemra after 2 weeks and is having active disease while on prednisone 10 mg, which is not helping her. I discussed changing her Actemra to Rituximab infusions and she was amenable to that. I will submit an order today. I injected her with Kenalog 80 mg IM today. She is to continue on prednisone 10 mg until she starts Rituximab. 2) Long Term Use of Systemic Steroids. She is on 10 mg.      3) Fibromyalgia. This was not an active issue today. 4) Long Term Use of Immunosuppressants.  The patient remains on immunomodulatory medications (methotrexate, Actemra) and requires frequent toxicity monitoring by blood work. Respective labs were ordered (CBC and CMP).    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 She is on alendronate 70 mg weekly with good tolerance. She takes 1000 mg of calcium daily and ergocalciferol.     6) Morbid Obesity. Her BMI was 46.69 (previously 46.17,  45.32, 43.69, 44.46). She is actively trying to lose weight. She was been more physically active and was doing Aqua Therapy, but has been on hold due her to laceration.    7) Hypertension. Her blood pressure was 144/92 (previously 149/89, 151/104, 160/112). She is on Coreg 12.5 mg twice daily. I recommend increasing to 25 mg twice daily, valsartan 80 mg daily.    8) Vitamin D Deficiency. Her vitamin D was 27.9 (24.1). She is on ergocalciferol 50,000 weekly. I refilled it.      9) Hypercholesterolemia. Her cholesterol was 170 (previously 215). She is on Lipitor 80 mg.       10) Constant Diaphoresis. This has recurred after resuming prednisone. 11) Major Depression. This is improving. She follow with a therapist and an NP psychiatrist.      12) Diabetes Mellitus Type II. Her HbA1c was 7.3% (previously 6.6%)    The patient voiced understanding of the aforementioned assessment and plan. Summary of plan was provided in the After Visit Summary patient instructions.      TODAY'S ORDERS    Orders Placed This Encounter    XR CHEST PA LAT    METABOLIC PANEL, COMPREHENSIVE    TRIAMCINOLONE ACETONIDE INJ    folic acid (FOLVITE) 1 mg tablet    methotrexate (RHEUMATREX) 2.5 mg tablet    VITAMIN D2 50,000 unit capsule    triamcinolone acetonide (KENALOG) 40 mg/mL injection     Future Appointments  Date Time Provider Department Center   6/26/2017 1:00 PM Olga Suarez MD [facility]   8/17/2017 1:40 PM Ginna Juárez MD [facility]     Mary Cortez MD, [address] [facility]    Adult Rheumatology   Musculoskeletal Ultrasound Certified  [facility] [place]   [id number] [place] [address] [address], [place], [address]   Phone [phone]  Fax [phone]

## 2017-06-21 NOTE — MR AVS SNAPSHOT
Visit Information Date & Time Provider Department Dept. Phone Encounter #  
 6/21/2017  8:40 AM Logan Ontiveros MD [facility] [id number] Follow-up Instructions Return in about 2 months (around 8/21/2017). Your Appointments 6/26/2017  1:00 PM  
ROUTINE CARE with MD Delonte Atwood Elizabetmila Packer [address] [place] [address] [facility] Appt Note: follow up [place] [phone]  
[facility] [address] Upcoming Health Maintenance Date Due  
 EYE EXAM RETINAL OR DILATED Q1 12/29/1963 MEDICARE YEARLY EXAM 12/29/1971 Pneumococcal 19-64 Medium Risk (1 of 1 - PPSV23) 12/29/1972 DTaP/Tdap/Td series (1 - Tdap) 12/29/1974 FOBT Q 1 YEAR AGE 50-75 12/29/2003 BREAST CANCER SCRN MAMMOGRAM 8/19/2016 INFLUENZA AGE 9 TO ADULT 8/1/2017 HEMOGLOBIN A1C Q6M 12/12/2017 LIPID PANEL Q1 3/6/2018 FOOT EXAM Q1 6/12/2018 MICROALBUMIN Q1 6/12/2018 PAP AKA CERVICAL CYTOLOGY 8/14/2020 Allergies as of 6/21/2017  Review Complete On: 6/21/2017 By: Kady Zepeda RN No Known Allergies Current Immunizations  Reviewed on 5/8/2017 Name Date Influenza Vaccine 9/6/2016 Influenza Vaccine Guinevere Maurisio) 11/19/2015  2:49 PM  
  
 Not reviewed this visit You Were Diagnosed With   
  
 Codes Comments Seropositive rheumatoid arthritis of multiple sites [facility])    -  Primary ICD-10-CM: M05.79 ICD-9-CM: 714.0 Long term (current) use of systemic steroids     ICD-10-CM: Z79.52 
ICD-9-CM: V58.65 Long term current use of immunosuppressive drug     ICD-10-CM: Z79.899 ICD-9-CM: V58.69 Vitamin D deficiency     ICD-10-CM: E55.9 ICD-9-CM: 268.9 Generalized hyperhidrosis     ICD-10-CM: R61 
ICD-9-CM: 780.8 LFT elevation     ICD-10-CM: R94.5 ICD-9-CM: 790.6 Vitals BP Pulse Temp Resp Height(growth percentile) Weight(growth percentile) (!) 144/92 (BP 1 Location: Right arm, BP Patient Position: Sitting) 94 98.2 °F (36.8 °C) 18 5' 4\" (1.626 m) 272 lb (123.4 kg) SpO2 BMI OB Status Smoking Status 93% 46.69 kg/m2 Postmenopausal Former Smoker BMI and BSA Data Body Mass Index Body Surface Area  
 46.69 kg/m 2 2.36 m 2 Preferred Pharmacy Pharmacy Name Phone [address] [phone] Your Updated Medication List  
  
   
This list is accurate as of: 6/21/17  9:24 AM.  Always use your most recent med list.  
  
  
  
  
 alendronate 70 mg tablet Commonly known as:  FOSAMAX Take 70 mg by mouth every seven (7) days. atorvastatin 40 mg tablet Commonly known as:  LIPITOR  
TAKE 1 TABLET BY MOUTH DAILY  
  
 buPROPion  mg XL tablet Commonly known as:  WELLBUTRIN XL  
TAKE 1 TABLET BY MOUTH EVERY MORNING  
  
 CALCIUM 600 + D 600-125 mg-unit Tab Generic drug:  calcium-cholecalciferol (d3) Take  by mouth daily. carvedilol 25 mg tablet Commonly known as:  COREG  
TAKE 1 TABLET BY MOUTH TWICE DAILY DULoxetine 40 mg Cpdr  
TK 1 C PO QD  
  
 folic acid 1 mg tablet Commonly known as:  Google Take  by mouth daily. gabapentin 300 mg capsule Commonly known as:  NEURONTIN  
TAKE 1 CAPSULE BY MOUTH THREE TIMES DAILY  
  
 levothyroxine 100 mcg tablet Commonly known as:  SYNTHROID  
TAKE 1 TABLET BY MOUTH DAILY  
  
 methotrexate 2.5 mg tablet Commonly known as:  Addison Anant Take 20 mg by mouth Every Saturday. predniSONE 5 mg tablet Commonly known as:  Christy Sake Take 3 Tabs by mouth daily. PROAIR HFA 90 mcg/actuation inhaler Generic drug:  albuterol Take 2 Puffs by inhalation every four (4) hours as needed.   
  
 TOCILIZUMAB IV  
 by IntraVENous route every thirty (30) days. Not taking  
  
 traMADol 50 mg tablet Commonly known as:  ULTRAM  
TAKE 1 TABLET BY MOUTH TWICE DAILY AS NEEDED. DAILY MAX  MGS  
  
 valsartan 80 mg tablet Commonly known as:  DIOVAN  
TAKE 1 TABLET BY MOUTH DAILY  
  
 VITAMIN D2 50,000 unit capsule Generic drug:  ergocalciferol Take 50,000 Units by mouth every seven (7) days. For 8 weeks We Performed the Following METABOLIC PANEL, COMPREHENSIVE [73789 CPT(R)] Follow-up Instructions Return in about 2 months (around 8/21/2017). To-Do List   
 06/21/2017 Imaging:  XR CHEST PA LAT [facility]! Dear Gera Mendoza: Thank you for requesting a [employer] account. Our records indicate that you have previously registered for a Angel[employer] account but its currently inactive. Please call our Angel[employer] support line at [phone]. Additional Information If you have questions, please visit the Frequently Asked Questions section of the Angel[employer] website at [email or web address] Remember, AngelPrime is NOT to be used for urgent needs. For medical emergencies, dial 911. Now available from your iPhone and Android! Please provide this summary of care documentation to your next provider. Your primary care clinician is listed as Vernell Rod. If you have any questions after today's visit, please call [phone].

## 2017-06-22 LAB
ALBUMIN SERPL-MCNC: 4.3 G/DL (ref 3.6–4.8)
ALBUMIN/GLOB SERPL: 1.9 {RATIO} (ref 1.2–2.2)
ALP SERPL-CCNC: 97 IU/L (ref 39–117)
ALT SERPL-CCNC: 54 IU/L (ref 0–32)
AST SERPL-CCNC: 33 IU/L (ref 0–40)
BILIRUB SERPL-MCNC: 0.4 MG/DL (ref 0–1.2)
BUN SERPL-MCNC: 12 MG/DL (ref 8–27)
BUN/CREAT SERPL: 16 (ref 12–28)
CALCIUM SERPL-MCNC: 9.2 MG/DL (ref 8.7–10.3)
CHLORIDE SERPL-SCNC: 97 MMOL/L (ref 96–106)
CO2 SERPL-SCNC: 22 MMOL/L (ref 18–29)
CREAT SERPL-MCNC: 0.73 MG/DL (ref 0.57–1)
GLOBULIN SER CALC-MCNC: 2.3 G/DL (ref 1.5–4.5)
GLUCOSE SERPL-MCNC: 183 MG/DL (ref 65–99)
POTASSIUM SERPL-SCNC: 4.9 MMOL/L (ref 3.5–5.2)
PROT SERPL-MCNC: 6.6 G/DL (ref 6–8.5)
SODIUM SERPL-SCNC: 140 MMOL/L (ref 134–144)

## 2017-06-27 ENCOUNTER — OFFICE VISIT (OUTPATIENT)
Dept: FAMILY MEDICINE CLINIC | Age: 62
End: 2017-06-27

## 2017-06-27 VITALS
TEMPERATURE: 98 F | HEART RATE: 91 BPM | WEIGHT: 267.6 LBS | DIASTOLIC BLOOD PRESSURE: 87 MMHG | HEIGHT: 64 IN | SYSTOLIC BLOOD PRESSURE: 124 MMHG | BODY MASS INDEX: 45.68 KG/M2 | OXYGEN SATURATION: 94 % | RESPIRATION RATE: 20 BRPM

## 2017-06-27 DIAGNOSIS — E11.8 CONTROLLED TYPE 2 DIABETES MELLITUS WITH COMPLICATION, WITHOUT LONG-TERM CURRENT USE OF INSULIN (HCC): Primary | ICD-10-CM

## 2017-06-27 DIAGNOSIS — E66.01 MORBID OBESITY WITH BMI OF 45.0-49.9, ADULT (HCC): ICD-10-CM

## 2017-06-27 DIAGNOSIS — I10 ESSENTIAL HYPERTENSION: ICD-10-CM

## 2017-06-27 DIAGNOSIS — R06.09 DOE (DYSPNEA ON EXERTION): ICD-10-CM

## 2017-06-27 NOTE — PROGRESS NOTES
Weight Loss Progress Note: follow up Physician Visit      Jaquelin Thompson is a 59 y.o. female with Xavier Alma Delia    who is here for her follow up  Evaluation for the medical bariatric care. She had a1c 7.3. I gave her a prescription for trulicity. She got 95 dollars as the cost. She is hesitant to spend that  She has snorted and woke herself up, not evaluated for MIGUEL  She has lost 5 lbs since her last visit here a week ago    CC:    Weight History  Current weight 267 and BMI is Body mass index is 45.93 kg/(m^2). Goal weight 200  Highest weight 272  (See weight gain time line scanned into media section of chart)        Significant Medical History    Update on sleep apnea and  CPAP has trouble falling asleep. Not sure if she snores. Ever had bariatric surgery: no    Pregnant or planning on becoming pregnant w/in 6 months: no     Significant Psychosocial History   Any history of drug abuse or dependence: no  Current Major Lifestyle Changes: no  Any potential unsupportive people: no          Social History  Social History   Substance Use Topics    Smoking status: Former Smoker     Packs/day: 0.50     Years: 20.00     Quit date: 1/1/2013    Smokeless tobacco: Never Used    Alcohol use No     How many times a week do you eat out?  0    Do you drink any EtOH?  no   If so, how much? Do you have upcoming any travel in the next 6 weeks?  no   If so, what do you have planned?         Exercise  How many days a week do you currently exercise?  2 days  Have you ever been told by a physician not to exercise?  no      Objective  Visit Vitals    /87    Pulse 91    Temp 98 °F (36.7 °C) (Oral)    Resp 20    Ht 5' 4\" (1.626 m)    Wt 267 lb 9.6 oz (121.4 kg)    SpO2 94%    BMI 45.93 kg/m2       Bicep - (right ) circumference  Waist Circumference: See Weight Management Doc Flowsheet  Neck Circumference: See Weight Management Doc Flowsheet  Percent Body Fat: See Weight Management Doc Flowsheet  Weight Metrics 6/27/2017 6/27/2017 6/21/2017 6/12/2017 6/5/2017 5/8/2017 3/27/2017   Weight - 267 lb 9.6 oz 272 lb 272 lb 275 lb 267 lb 9.6 oz 267 lb   Neck Circ (inches) 18 - - - - - -   Waist Measure Inches 55.5 - - - - - -   Exercise Mins/week 0 - - - - - -   BMI - 45.93 kg/m2 46.69 kg/m2 45.97 kg/m2 46.47 kg/m2 45.22 kg/m2 45.83 kg/m2         Labs:     Review of Systems  Complete ROS negative except where noted above      Physical Exam    Vital Signs Reviewed  Weight Management Metrics Reviewed    Vital Signs Reviewed  Appearance: Obese, A&O x 3, NAD  HEENT:  NC/AT, EOMI, PERRL, No scleral icterus, malampatti score:4  Skin:    Skin tags - no   Acanthosis Nigricans - no  Neck:  No nodes, thyromegaly   Heart:  RRR without M/R/G  Lungs:  CTAB, no rhonchi, rales, or wheezes with good air exchange   Abdomen:  Non-tender, pos bowel sounds; hepatomegaly -   Ext:  No C/C/E        Assessment & Plan  Taylor Luther was seen today for medication evaluation. Diagnoses and all orders for this visit:    Controlled type 2 diabetes mellitus with complication, without long-term current use of insulin ([address]  Frutoso [place] not started the meds yet. She will go ahead and get it  Morbid obesity with BMI of 45.0-49.9, adult ([address]  Diet:day 1-4    Activity:as active as tolerated    Medication: trulicity  Essential hypertension  bp good control  MENDEZ (dyspnea on exertion)  -     REFERRAL TO PULMONARY DISEASE  Sending her to specialist to get eval      Based on his history and exam, Dulce Bishop is a good candidate for the  [facility] Weight Loss Program         Patient Instructions        Noninsulin Medicines for Type 2 Diabetes: Care Instructions  Your Care Instructions  There are different types of noninsulin medicines for diabetes. Each works in a different way. But they all help you control your blood sugar. Some types help your body make insulin to lower your blood sugar. Others lower how much insulin your body needs.  Some can slow how fast your body digests sugars. And some can remove extra glucose through your urine. · Alpha-glucosidase inhibitors. These keep starches from breaking down. This means that they lower the amount of glucose absorbed when you eat. They don't help your body make more insulin. So they will not cause low blood sugar unless you use them with other medicines for diabetes. They include acarbose and miglitol. · DPP-4 inhibitors. These help your body raise the level of insulin after you eat. They also help your body make less of a hormone that raises blood sugar. They include linagliptin, saxagliptin, and sitagliptin. · Incretin hormones (GLP-1 receptor agonists). Your body makes a protein that can raise your insulin level. It also can lower your blood sugar and make you less hungry. You can get shots of hormones that work the same way. They include exenatide and liraglutide. · Meglitinides. These help your body release insulin. They also help slow how your body digests sugars. So they can keep your blood sugar from rising too fast after you eat. They include nateglinide and repaglinide. · Metformin. This lowers how much glucose your liver makes. And it helps you respond better to insulin. It also lowers the amount of stored sugar that your liver releases when you are not eating. · SGLT2 inhibitors. These help to remove extra glucose through your urine. They may also help some people lose weight. They include canagliflozin, dapagliflozin, and empagliflozin. · Sulfonylureas. These help your body release more insulin. Some work for many hours. They can cause low blood sugar if you don't eat as you planned. They include glipizide and glyburide. · Thiazolidinediones. These reduce the amount of blood glucose. They also help you respond better to insulin. They include pioglitazone and rosiglitazone. You may need to take more than one medicine for diabetes.  Two or more medicines may work better to lower your blood sugar level than just one does.  Follow-up care is a key part of your treatment and safety. Be sure to make and go to all appointments, and call your doctor if you are having problems. It's also a good idea to know your test results and keep a list of the medicines you take. How can you care for yourself at home? · Eat a healthy diet. Get some exercise each day. This may help you to reduce how much medicine you need. · Do not take other prescription or over-the-counter medicines, vitamins, herbal products, or supplements without talking to your doctor first. Some medicines for type 2 diabetes can cause problems with other medicines or supplements. · Tell your doctor if you plan to get pregnant. Some of these drugs are not safe for pregnant women. · Be safe with medicines. Take your medicines exactly as prescribed. Meglitinides and sulfonylureas can cause your blood sugar to drop very low. Call your doctor if you think you are having a problem with your medicine. · Check your blood sugar often. You can use a glucose monitor. Keeping track can help you know how certain foods, activities, and medicines affect your blood sugar. And it can help you keep your blood sugar from getting so low that it's not safe. When should you call for help? Call 911 anytime you think you may need emergency care. For example, call if:  · You passed out (lost consciousness). · You are confused or cannot think clearly. · Your blood sugar is very high or very low. Watch closely for changes in your health, and be sure to contact your doctor if:  · Your blood sugar stays outside the level your doctor set for you. · You have any problems. Where can you learn more? Go to http://tu-jose manuel.info/. Enter H153 in the search box to learn more about \"Noninsulin Medicines for Type 2 Diabetes: Care Instructions. \"  Current as of: March 13, 2017  Content Version: 11.3  © 2086-5365 [employer].  Care instructions adapted under license by [facility] [employer] (which disclaims liability or warranty for this information). If you have questions about a medical condition or this instruction, always ask your healthcare professional. [address]  any warranty or liability for your use of this information. Follow-up Disposition:  Return in about 2 weeks (around 7/11/2017). Over 50% of the 30 minutes face to face time with David Gaspar consisted of counseling & coordinating and/or discussing treatment plans in reference to her obesity The primary encounter diagnosis was Controlled type 2 diabetes mellitus with complication, without long-term current use of insulin ([address] Diagnoses of Morbid obesity with BMI of 45.0-49.9, adult ([address] Essential hypertension, and MENDEZ (dyspnea on exertion) were also pertinent to this visit.   The patient verbalizes understanding and agrees with the plan of care    Patient has the advanced directives  booklet to review

## 2017-06-27 NOTE — PROGRESS NOTES
Chief Complaint   Patient presents with    Medication Evaluation     Pt here for follow up with provider from wellness visit. Pt had dizziness and Nausea yesterday almost fell.      Pt having issues with price for medication

## 2017-06-27 NOTE — MR AVS SNAPSHOT
Visit Information Date & Time Provider Department Dept. Phone Encounter #  
 6/27/2017  1:45 PM Justin Levine MD [facility] [id number] Your Appointments 8/17/2017  1:40 PM  
ESTABLISHED PATIENT with Helio Balderas MD  
Arthritis and [address] (Maryam Ordaz) Appt Note: fu 2 mo  
 [address] [place] [address]  
[phone]  
  
   
 [address] [address] Upcoming Health Maintenance Date Due  
 EYE EXAM RETINAL OR DILATED Q1 12/29/1963 MEDICARE YEARLY EXAM 12/29/1971 Pneumococcal 19-64 Medium Risk (1 of 1 - PPSV23) 12/29/1972 DTaP/Tdap/Td series (1 - Tdap) 12/29/1974 FOBT Q 1 YEAR AGE 50-75 12/29/2003 BREAST CANCER SCRN MAMMOGRAM 8/19/2016 INFLUENZA AGE 9 TO ADULT 8/1/2017 HEMOGLOBIN A1C Q6M 12/12/2017 LIPID PANEL Q1 3/6/2018 FOOT EXAM Q1 6/12/2018 MICROALBUMIN Q1 6/12/2018 PAP AKA CERVICAL CYTOLOGY 8/14/2020 Allergies as of 6/27/2017  Review Complete On: 6/27/2017 By: Justin Levine MD  
 No Known Allergies Current Immunizations  Reviewed on 5/8/2017 Name Date Influenza Vaccine 9/6/2016 Influenza Vaccine Avaneberlin Flood) 11/19/2015  2:49 PM  
  
 Not reviewed this visit You Were Diagnosed With   
  
 Codes Comments Morbid obesity with BMI of 45.0-49.9, adult ([facility]    -  Primary ICD-10-CM: E66.01, Z68.42 
ICD-9-CM: 278.01, V85.42 Controlled type 2 diabetes mellitus with complication, without long-term current use of insulin ([facility])     ICD-10-CM: E11.8 ICD-9-CM: 250.90 Essential hypertension     ICD-10-CM: I10 
ICD-9-CM: 401.9 MENDEZ (dyspnea on exertion)     ICD-10-CM: R06.09 
ICD-9-CM: 786.09 Vitals BP Pulse Temp Resp Height(growth percentile) Weight(growth percentile) 124/87 91 98 °F (36.7 °C) (Oral) 20 5' 4\" (1.626 m) 267 lb 9.6 oz (121.4 kg) SpO2 BMI OB Status Smoking Status 94% 45.93 kg/m2 Postmenopausal Former Smoker BMI and BSA Data Body Mass Index Body Surface Area 45.93 kg/m 2 2.34 m 2 Preferred Pharmacy Pharmacy Name Phone [address] [phone] Your Updated Medication List  
  
   
This list is accurate as of: 6/27/17  2:51 PM.  Always use your most recent med list.  
  
  
  
  
 alendronate 70 mg tablet Commonly known as:  FOSAMAX Take 70 mg by mouth every seven (7) days. atorvastatin 40 mg tablet Commonly known as:  LIPITOR  
TAKE 1 TABLET BY MOUTH DAILY  
  
 buPROPion  mg XL tablet Commonly known as:  WELLBUTRIN XL  
TAKE 1 TABLET BY MOUTH EVERY MORNING  
  
 CALCIUM 600 + D 600-125 mg-unit Tab Generic drug:  calcium-cholecalciferol (d3) Take  by mouth daily. carvedilol 25 mg tablet Commonly known as:  COREG  
TAKE 1 TABLET BY MOUTH TWICE DAILY DULoxetine 40 mg Cpdr  
TK 1 C PO QD  
  
 folic acid 1 mg tablet Commonly known as:  Google Take  by mouth daily. gabapentin 300 mg capsule Commonly known as:  NEURONTIN  
TAKE 1 CAPSULE BY MOUTH THREE TIMES DAILY  
  
 levothyroxine 100 mcg tablet Commonly known as:  SYNTHROID  
TAKE 1 TABLET BY MOUTH DAILY  
  
 methotrexate 2.5 mg tablet Commonly known as:  [place] Take 8 Tabs by mouth Every Saturday for 90 days. predniSONE 5 mg tablet Commonly known as:  Julian Primer Take 3 Tabs by mouth daily. PROAIR HFA 90 mcg/actuation inhaler Generic drug:  albuterol Take 2 Puffs by inhalation every four (4) hours as needed. traMADol 50 mg tablet Commonly known as:  ULTRAM  
TAKE 1 TABLET BY MOUTH TWICE DAILY AS NEEDED. DAILY MAX  MGS  
  
 valsartan 80 mg tablet Commonly known as:  DIOVAN  
TAKE 1 TABLET BY MOUTH DAILY  
  
 VITAMIN D2 50,000 unit capsule Generic drug:  ergocalciferol Take 1 Cap by mouth every seven (7) days. We Performed the Following REFERRAL TO PULMONARY DISEASE [XQN81 Custom] Comments:  
 Evaluate for COPD or other lung disease. She c/o MENDEZ. H/o smoking for 22 yrs, quit 4 years ago To-Do List   
 07/10/2017 10:00 AM  
  Appointment with [address] [facility] ([phone])  
  
 07/24/2017 10:00 AM  
  [facility] [address] [facility][phone][facility] 01/08/2018 10:00 AM  
  Appointment with [address] 2 at [facility] ([phone])  
  
 01/22/2018 10:00 AM  
  Appointment with [address] at [facility] ([phone]) Referral Information Referral ID Referred By Referred To  
  
 [id number] Emma Mcmahan [facility] [address] [place] [facility][1] Visits Status Start Date End Date 1 New Request 6/27/17 6/27/18 If your referral has a status of pending review or denied, additional information will be sent to support the outcome of this decision. Patient Instructions Noninsulin Medicines for Type 2 Diabetes: Care Instructions Your Care Instructions There are different types of noninsulin medicines for diabetes. Each works in a different way. But they all help you control your blood sugar. Some types help your body make insulin to lower your blood sugar. Others lower how much insulin your body needs. Some can slow how fast your body digests sugars. And some can remove extra glucose through your urine. · Alpha-glucosidase inhibitors. These keep starches from breaking down. This means that they lower the amount of glucose absorbed when you eat. They don't help your body make more insulin. So they will not cause low blood sugar unless you use them with other medicines for diabetes. They include acarbose and miglitol. · DPP-4 inhibitors. These help your body raise the level of insulin after you eat. They also help your body make less of a hormone that raises blood sugar. They include linagliptin, saxagliptin, and sitagliptin. · Incretin hormones (GLP-1 receptor agonists). Your body makes a protein that can raise your insulin level. It also can lower your blood sugar and make you less hungry. You can get shots of hormones that work the same way. They include exenatide and liraglutide. · Meglitinides. These help your body release insulin. They also help slow how your body digests sugars. So they can keep your blood sugar from rising too fast after you eat. They include nateglinide and repaglinide. · Metformin. This lowers how much glucose your liver makes. And it helps you respond better to insulin. It also lowers the amount of stored sugar that your liver releases when you are not eating. · SGLT2 inhibitors. These help to remove extra glucose through your urine. They may also help some people lose weight. They include canagliflozin, dapagliflozin, and empagliflozin. · Sulfonylureas. These help your body release more insulin. Some work for many hours. They can cause low blood sugar if you don't eat as you planned. They include glipizide and glyburide. · Thiazolidinediones. These reduce the amount of blood glucose. They also help you respond better to insulin. They include pioglitazone and rosiglitazone. You may need to take more than one medicine for diabetes. Two or more medicines may work better to lower your blood sugar level than just one does. Follow-up care is a key part of your treatment and safety. Be sure to make and go to all appointments, and call your doctor if you are having problems. It's also a good idea to know your test results and keep a list of the medicines you take. How can you care for yourself at home? · Eat a healthy diet. Get some exercise each day.  This may help you to reduce how much medicine you need. · Do not take other prescription or over-the-counter medicines, vitamins, herbal products, or supplements without talking to your doctor first. Some medicines for type 2 diabetes can cause problems with other medicines or supplements. · Tell your doctor if you plan to get pregnant. Some of these drugs are not safe for pregnant women. · Be safe with medicines. Take your medicines exactly as prescribed. Meglitinides and sulfonylureas can cause your blood sugar to drop very low. Call your doctor if you think you are having a problem with your medicine. · Check your blood sugar often. You can use a glucose monitor. Keeping track can help you know how certain foods, activities, and medicines affect your blood sugar. And it can help you keep your blood sugar from getting so low that it's not safe. When should you call for help? Call 911 anytime you think you may need emergency care. For example, call if: 
· You passed out (lost consciousness). · You are confused or cannot think clearly. · Your blood sugar is very high or very low. Watch closely for changes in your health, and be sure to contact your doctor if: 
· Your blood sugar stays outside the level your doctor set for you. · You have any problems. Where can you learn more? Go to http://tu-jose manuel.info/. Enter H153 in the search box to learn more about \"Noninsulin Medicines for Type 2 Diabetes: Care Instructions. \" Current as of: March 13, 2017 Content Version: 11.3 © 5603-5050 [employer]. Care instructions adapted under license by [employer] (which disclaims liability or warranty for this information). If you have questions about a medical condition or this instruction, always ask your healthcare professional. [place] [address] any warranty or liability for your use of this information. Introducing [facility]! Dear Ronnie Age: Thank you for requesting a [employer] account. Our records indicate that you have previously registered for a [employer] account but its currently inactive. Please call our [employer] support line at [phone]. Additional Information If you have questions, please visit the Frequently Asked Questions section of the [employer] website at [email or web address] Remember, [employer] is NOT to be used for urgent needs. For medical emergencies, dial 911. Now available from your iPhone and Android! Please provide this summary of care documentation to your next provider. Your primary care clinician is listed as Gertrude Givens. If you have any questions after today's visit, please call [phone].

## 2017-06-27 NOTE — PATIENT INSTRUCTIONS
Noninsulin Medicines for Type 2 Diabetes: Care Instructions  Your Care Instructions  There are different types of noninsulin medicines for diabetes. Each works in a different way. But they all help you control your blood sugar. Some types help your body make insulin to lower your blood sugar. Others lower how much insulin your body needs. Some can slow how fast your body digests sugars. And some can remove extra glucose through your urine. · Alpha-glucosidase inhibitors. These keep starches from breaking down. This means that they lower the amount of glucose absorbed when you eat. They don't help your body make more insulin. So they will not cause low blood sugar unless you use them with other medicines for diabetes. They include acarbose and miglitol. · DPP-4 inhibitors. These help your body raise the level of insulin after you eat. They also help your body make less of a hormone that raises blood sugar. They include linagliptin, saxagliptin, and sitagliptin. · Incretin hormones (GLP-1 receptor agonists). Your body makes a protein that can raise your insulin level. It also can lower your blood sugar and make you less hungry. You can get shots of hormones that work the same way. They include exenatide and liraglutide. · Meglitinides. These help your body release insulin. They also help slow how your body digests sugars. So they can keep your blood sugar from rising too fast after you eat. They include nateglinide and repaglinide. · Metformin. This lowers how much glucose your liver makes. And it helps you respond better to insulin. It also lowers the amount of stored sugar that your liver releases when you are not eating. · SGLT2 inhibitors. These help to remove extra glucose through your urine. They may also help some people lose weight. They include canagliflozin, dapagliflozin, and empagliflozin. · Sulfonylureas. These help your body release more insulin. Some work for many hours.  They can cause low blood sugar if you don't eat as you planned. They include glipizide and glyburide. · Thiazolidinediones. These reduce the amount of blood glucose. They also help you respond better to insulin. They include pioglitazone and rosiglitazone. You may need to take more than one medicine for diabetes. Two or more medicines may work better to lower your blood sugar level than just one does. Follow-up care is a key part of your treatment and safety. Be sure to make and go to all appointments, and call your doctor if you are having problems. It's also a good idea to know your test results and keep a list of the medicines you take. How can you care for yourself at home? · Eat a healthy diet. Get some exercise each day. This may help you to reduce how much medicine you need. · Do not take other prescription or over-the-counter medicines, vitamins, herbal products, or supplements without talking to your doctor first. Some medicines for type 2 diabetes can cause problems with other medicines or supplements. · Tell your doctor if you plan to get pregnant. Some of these drugs are not safe for pregnant women. · Be safe with medicines. Take your medicines exactly as prescribed. Meglitinides and sulfonylureas can cause your blood sugar to drop very low. Call your doctor if you think you are having a problem with your medicine. · Check your blood sugar often. You can use a glucose monitor. Keeping track can help you know how certain foods, activities, and medicines affect your blood sugar. And it can help you keep your blood sugar from getting so low that it's not safe. When should you call for help? Call 911 anytime you think you may need emergency care. For example, call if:  · You passed out (lost consciousness). · You are confused or cannot think clearly. · Your blood sugar is very high or very low.   Watch closely for changes in your health, and be sure to contact your doctor if:  · Your blood sugar stays outside the level your doctor set for you. · You have any problems. Where can you learn more? Go to http://tu-jose manuel.info/. Enter H153 in the search box to learn more about \"Noninsulin Medicines for Type 2 Diabetes: Care Instructions. \"  Current as of: March 13, 2017  Content Version: 11.3  © 2745-5042 [employer]. Care instructions adapted under license by [employer] (which disclaims liability or warranty for this information). If you have questions about a medical condition or this instruction, always ask your healthcare professional. [address] any warranty or liability for your use of this information.

## 2017-06-28 ENCOUNTER — TELEPHONE (OUTPATIENT)
Dept: RHEUMATOLOGY | Age: 62
End: 2017-06-28

## 2017-06-28 NOTE — TELEPHONE ENCOUNTER
Pt called stating that she was prescribed prednisone 10mg daily and she stated that she needs 15mg daily. The 10mg dose is not helping her and she has tried to just take 10mg but she can't. She wants to know if we can send in a script for 15mg daily of the prednisone?

## 2017-06-29 ENCOUNTER — TELEPHONE (OUTPATIENT)
Dept: RHEUMATOLOGY | Age: 62
End: 2017-06-29

## 2017-06-29 RX ORDER — PREDNISONE 5 MG/1
15 TABLET ORAL DAILY
Qty: 90 TAB | Refills: 3 | Status: SHIPPED | OUTPATIENT
Start: 2017-06-29 | End: 2017-09-01 | Stop reason: SDUPTHER

## 2017-07-03 ENCOUNTER — APPOINTMENT (OUTPATIENT)
Dept: INFUSION THERAPY | Age: 62
End: 2017-07-03
Payer: MEDICARE

## 2017-07-05 RX ORDER — ACETAMINOPHEN 325 MG/1
650 TABLET ORAL
Status: ACTIVE | OUTPATIENT
Start: 2017-07-10 | End: 2017-07-10

## 2017-07-05 RX ORDER — DIPHENHYDRAMINE HYDROCHLORIDE 50 MG/ML
50 INJECTION, SOLUTION INTRAMUSCULAR; INTRAVENOUS ONCE
Status: ACTIVE | OUTPATIENT
Start: 2017-07-10 | End: 2017-07-10

## 2017-07-05 RX ORDER — ACETAMINOPHEN 325 MG/1
650 TABLET ORAL ONCE
Status: ACTIVE | OUTPATIENT
Start: 2017-07-10 | End: 2017-07-10

## 2017-07-05 RX ORDER — DIPHENHYDRAMINE HYDROCHLORIDE 50 MG/ML
50 INJECTION, SOLUTION INTRAMUSCULAR; INTRAVENOUS
Status: ACTIVE | OUTPATIENT
Start: 2017-07-10 | End: 2017-07-10

## 2017-07-07 ENCOUNTER — TELEPHONE (OUTPATIENT)
Dept: RHEUMATOLOGY | Age: 62
End: 2017-07-07

## 2017-07-07 NOTE — TELEPHONE ENCOUNTER
Spoke with Elzbieta at the infusion center and she asked if we needed to have any labs drawn on Ms. Johnson before her infusion on Monday of the Rituxan? I asked Dr. Armen Magana and he stated that she did not need any further labs drawn as she had a CMP done on 6/21/17. She stated an understanding.

## 2017-07-10 ENCOUNTER — HOSPITAL ENCOUNTER (OUTPATIENT)
Dept: INFUSION THERAPY | Age: 62
Discharge: HOME | End: 2017-07-10
Payer: MEDICARE

## 2017-07-10 NOTE — PROGRESS NOTES
[facility] VISIT NOTE    ***  Pt arrived at [facility] ambulatory and in no distress for C1D1 of Rituxan. Assessment completed, pt c/o ***. PIV started with no difficulty. Positive blood return noted. No new labs needed per MD on 7/7/17. Medications received:  ***    Tolerated treatment well, no adverse reaction noted. Port de-accessed and flushed per protocol. Positive blood return noted. ***  D/C'd from [facility] ambulatory and in no distress accompanied by ***. Next appointment is *** at ***.

## 2017-07-17 ENCOUNTER — OFFICE VISIT (OUTPATIENT)
Dept: FAMILY MEDICINE CLINIC | Age: 62
End: 2017-07-17

## 2017-07-17 VITALS
RESPIRATION RATE: 19 BRPM | HEIGHT: 64 IN | DIASTOLIC BLOOD PRESSURE: 93 MMHG | WEIGHT: 262.2 LBS | HEART RATE: 108 BPM | OXYGEN SATURATION: 95 % | TEMPERATURE: 99 F | BODY MASS INDEX: 44.76 KG/M2 | SYSTOLIC BLOOD PRESSURE: 148 MMHG

## 2017-07-17 DIAGNOSIS — E66.01 MORBID OBESITY WITH BMI OF 45.0-49.9, ADULT (HCC): ICD-10-CM

## 2017-07-17 DIAGNOSIS — Z78.0 POSTMENOPAUSAL: ICD-10-CM

## 2017-07-17 DIAGNOSIS — Z00.00 ROUTINE GENERAL MEDICAL EXAMINATION AT A HEALTH CARE FACILITY: Primary | ICD-10-CM

## 2017-07-17 DIAGNOSIS — Z23 ENCOUNTER FOR IMMUNIZATION: ICD-10-CM

## 2017-07-17 DIAGNOSIS — Z71.89 ADVANCED DIRECTIVES, COUNSELING/DISCUSSION: ICD-10-CM

## 2017-07-17 DIAGNOSIS — Z13.39 SCREENING FOR ALCOHOLISM: ICD-10-CM

## 2017-07-17 DIAGNOSIS — Z12.11 COLON CANCER SCREENING: ICD-10-CM

## 2017-07-17 RX ORDER — DULAGLUTIDE 0.75 MG/.5ML
INJECTION, SOLUTION SUBCUTANEOUS
Refills: 3 | COMMUNITY
Start: 2017-06-28 | End: 2017-08-28 | Stop reason: ALTCHOICE

## 2017-07-17 NOTE — MR AVS SNAPSHOT
Visit Information Date & Time Provider Department Dept. Phone Encounter #  
 7/17/2017  2:45 PM Katherine Barraza MD [address] [place] [address] [id number] Follow-up Instructions Return in 1 week (on 7/24/2017), or if symptoms worsen or fail to improve. Your Appointments 8/17/2017  1:40 PM  
ESTABLISHED PATIENT with Angela Hudson MD  
Arthritis and [address] ([address]) Appt Note: fu 2 mo  
 [address] [facility] [address]  
[phone]  
  
   
 [address] [facility] [id number] Upcoming Health Maintenance Date Due  
 EYE EXAM RETINAL OR DILATED Q1 12/29/1963 FOBT Q 1 YEAR AGE 50-75 12/29/2003 BREAST CANCER SCRN MAMMOGRAM 8/19/2016 INFLUENZA AGE 9 TO ADULT 8/1/2017 HEMOGLOBIN A1C Q6M 12/12/2017 LIPID PANEL Q1 3/6/2018 FOOT EXAM Q1 6/12/2018 MICROALBUMIN Q1 6/12/2018 MEDICARE YEARLY EXAM 7/18/2018 PAP AKA CERVICAL CYTOLOGY 8/14/2020 DTaP/Tdap/Td series (2 - Td) 12/26/2026 Allergies as of 7/17/2017  Review Complete On: 7/17/2017 By: Katherine Barraza MD  
 No Known Allergies Current Immunizations  Reviewed on 5/8/2017 Name Date Influenza Vaccine 9/6/2016 Influenza Vaccine (Quad) 11/19/2015  2:49 PM  
 Pneumococcal Polysaccharide (PPSV-23)  Incomplete Not reviewed this visit You Were Diagnosed With   
  
 Codes Comments Routine general medical examination at a health care facility    -  Primary ICD-10-CM: Z00.00 ICD-9-CM: V70.0 Screening for alcoholism     ICD-10-CM: Z13.89 ICD-9-CM: V79.1 Advanced directives, counseling/discussion     ICD-10-CM: Z71.89 ICD-9-CM: V65.49 Postmenopausal     ICD-10-CM: Z78.0 ICD-9-CM: V49.81 Encounter for immunization     ICD-10-CM: T88 ICD-9-CM: V03.89 Colon cancer screening     ICD-10-CM: Z12.11 ICD-9-CM: V76.51   
 Morbid obesity with BMI of 45.0-49.9, adult (HCC)     ICD-10-CM: E66.01, Z68.42 
ICD-9-CM: 278.01, V85.42 Vitals BP Pulse Temp Resp Height(growth percentile) Weight(growth percentile) (!) 148/93 (!) 108 99 °F (37.2 °C) (Oral) 19 5' 4\" (1.626 m) 262 lb 3.2 oz (118.9 kg) SpO2 BMI OB Status Smoking Status 95% 45.01 kg/m2 Postmenopausal Former Smoker Vitals History BMI and BSA Data Body Mass Index Body Surface Area 45.01 kg/m 2 2.32 m 2 Preferred Pharmacy Pharmacy Name Phone [address] [phone] Your Updated Medication List  
  
   
This list is accurate as of: 7/17/17  3:40 PM.  Always use your most recent med list.  
  
  
  
  
 alendronate 70 mg tablet Commonly known as:  FOSAMAX Take 70 mg by mouth every seven (7) days. atorvastatin 40 mg tablet Commonly known as:  LIPITOR  
TAKE 1 TABLET BY MOUTH DAILY  
  
 buPROPion  mg XL tablet Commonly known as:  WELLBUTRIN XL  
TAKE 1 TABLET BY MOUTH EVERY MORNING  
  
 CALCIUM 600 + D 600-125 mg-unit Tab Generic drug:  calcium-cholecalciferol (d3) Take  by mouth daily. carvedilol 25 mg tablet Commonly known as:  COREG  
TAKE 1 TABLET BY MOUTH TWICE DAILY DULoxetine 40 mg Cpdr  
TK 1 C PO QD  
  
 folic acid 1 mg tablet Commonly known as:  Google Take  by mouth daily. gabapentin 300 mg capsule Commonly known as:  NEURONTIN  
TAKE 1 CAPSULE BY MOUTH THREE TIMES DAILY  
  
 levothyroxine 100 mcg tablet Commonly known as:  SYNTHROID  
TAKE 1 TABLET BY MOUTH DAILY  
  
 methotrexate 2.5 mg tablet Commonly known as:  Dillan January Take 8 Tabs by mouth Every Saturday for 90 days. predniSONE 5 mg tablet Commonly known as:  Graydon Sully Take 3 Tabs by mouth daily. PROAIR HFA 90 mcg/actuation inhaler Generic drug:  albuterol Take 2 Puffs by inhalation every four (4) hours as needed. traMADol 50 mg tablet Commonly known as:  ULTRAM  
TAKE 1 TABLET BY MOUTH TWICE DAILY AS NEEDED. DAILY MAX  MGS  
  
 TRULICITY 1.55 IK/1.8 mL sub-q pen Generic drug:  dulaglutide INJ 1 SYRINGEFUL SC Q 7 DAYS  
  
 valsartan 80 mg tablet Commonly known as:  DIOVAN  
TAKE 1 TABLET BY MOUTH DAILY  
  
 VITAMIN D2 50,000 unit capsule Generic drug:  ergocalciferol Take 1 Cap by mouth every seven (7) days. We Performed the Following ADMIN PNEUMOCOCCAL VACCINE [ [facility]] ADVANCE CARE PLANNING FIRST 30 MINS [90224 CPT(R)] OCCULT BLOOD, IMMUNOASSAY (FIT) [id number] CPT(R)] PNEUMOCOCCAL POLYSACCHARIDE VACCINE, 23-VALENT, ADULT OR IMMUNOSUPPRESSED PT DOSE, [96534 CPT(R)] Follow-up Instructions Return in 1 week (on 7/24/2017), or if symptoms worsen or fail to improve. To-Do List   
 07/20/2017 Imaging:  DEXA BONE DENSITY STUDY AXIAL   
  
 07/24/2017 10:00 AM  
  Appointment with [address] at [place] [address] ([phone]) 01/08/2018 10:00 AM  
  Appointment with [address] at [place] [address] ([phone])  
  
 01/22/2018 10:00 AM  
  Appointment with [address] at [place] [address] ([phone]) [facility]! Dear Caesar Edwards: Thank you for requesting a [employer] account. Our records indicate that you have previously registered for a [employer] account but its currently inactive. Please call our [employer] support line at [phone]. Additional Information If you have questions, please visit the Frequently Asked Questions section of the [employer] website at [email or web address] Remember, [employer] is NOT to be used for urgent needs. For medical emergencies, dial 911. Now available from your iPhone and Android! Please provide this summary of care documentation to your next provider. Your primary care clinician is listed as Uriah Thompson. If you have any questions after today's visit, please call [phone].

## 2017-07-17 NOTE — PROGRESS NOTES
1. Have you been to the ER, urgent care clinic since your last visit? Hospitalized since your last visit? No    2. Have you seen or consulted any other health care providers outside of the [address] [place] [address] since your last visit? Include any pap smears or colon screening.  No     Chief Complaint   Patient presents with    Weight Management     Body Weight: 262.2  Body Fat%: 50.7  Muscle Mass Weight: 51.0  Body Water Weight: 20.3  Basal Metabolic Rate: 1432  BMI: 45.01

## 2017-07-17 NOTE — PROGRESS NOTES
This is an Initial Medicare Annual Wellness Exam (AWV) (Performed 12 months after IPPE or effective date of Medicare Part B enrollment, Once in a lifetime)    I have reviewed the patient's medical history in detail and updated the computerized patient record. History     Past Medical History:   Diagnosis Date    Anxiety     Arrhythmia     Arthritis     RA    Arthritis of knee 1/30/2012    Autoimmune disease ([facility]     RA    Bronchitis     Chronic pain     due to RA    Depression     Dyslipidemia     Morbid obesity ([facility]     Nausea & vomiting     Other screening mammogram 8/19/15    Mammogram in 1 year    Overweight     Papanicolaou smear for cervical cancer screening 8/14/15    neg; HPV neg    Rheumatoid aortitis (HCC)     SVT (supraventricular tachycardia) ([facility] [address] [facility] 1993    hx of. underwent cardiac ablation       Past Surgical History:   Procedure Laterality Date    BREAST SURGERY PROCEDURE UNLISTED      right breast mass excision    CARDIAC SURG PROCEDURE UNLIST  1993    cardiac ablation for SVT    HX GYN      BTL    HX HEENT  6-30-14    root canal    HX HIP REPLACEMENT      2006, 2009    HX KNEE REPLACEMENT      2012, Dr. Cee Situ      2 rods and 4 bolts Dr. Patito Bains  7/2014    HX ORTHOPAEDIC  2005 2009    bilateral total hip replacement    HX ORTHOPAEDIC      left toe    HX ORTHOPAEDIC  2012    bilateral knee replacements    HX ORTHOPAEDIC  2013    left elbow x 5    HX TONSILLECTOMY       Current Outpatient Prescriptions   Medication Sig Dispense Refill    TRULICITY 4.12 RL/1.5 mL sub-q pen INJ 1 SYRINGEFUL SC Q 7 DAYS  3    predniSONE (DELTASONE) 5 mg tablet Take 3 Tabs by mouth daily. 90 Tab 3    folic acid (FOLVITE) 1 mg tablet Take  by mouth daily.  methotrexate (RHEUMATREX) 2.5 mg tablet Take 8 Tabs by mouth Every Saturday for 90 days. 96 Tab 0    VITAMIN D2 50,000 unit capsule Take 1 Cap by mouth every seven (7) days.  12 Cap 3    PROAIR HFA 90 mcg/actuation inhaler Take 2 Puffs by inhalation every four (4) hours as needed. 1 Inhaler 2    traMADol (ULTRAM) 50 mg tablet TAKE 1 TABLET BY MOUTH TWICE DAILY AS NEEDED. DAILY MAX  MGS 30 Tab 0    valsartan (DIOVAN) 80 mg tablet TAKE 1 TABLET BY MOUTH DAILY 90 Tab 3    carvedilol (COREG) 25 mg tablet TAKE 1 TABLET BY MOUTH TWICE DAILY 180 Tab 0    atorvastatin (LIPITOR) 40 mg tablet TAKE 1 TABLET BY MOUTH DAILY 90 Tab 3    levothyroxine (SYNTHROID) 100 mcg tablet TAKE 1 TABLET BY MOUTH DAILY 90 Tab 3    buPROPion XL (WELLBUTRIN XL) 300 mg XL tablet TAKE 1 TABLET BY MOUTH EVERY MORNING 90 Tab 1    gabapentin (NEURONTIN) 300 mg capsule TAKE 1 CAPSULE BY MOUTH THREE TIMES DAILY 270 Cap 1    DULoxetine 40 mg cpDR TK 1 C PO QD  1    alendronate (FOSAMAX) 70 mg tablet Take 70 mg by mouth every seven (7) days. 3    calcium-cholecalciferol, d3, (CALCIUM 600 + D) 600-125 mg-unit tab Take  by mouth daily.        No Known Allergies  Family History   Problem Relation Age of Onset    Diabetes Brother     Heart Disease Brother     Diabetes Mother     Heart Disease Father     Breast Cancer Other      Paternal Great Aunt     Social History   Substance Use Topics    Smoking status: Former Smoker     Packs/day: 0.50     Years: 20.00     Quit date: 1/1/2013    Smokeless tobacco: Never Used    Alcohol use No     Patient Active Problem List   Diagnosis Code    Arthritis of knee M17.10    Lumbar stenosis with neurogenic claudication M48.06    Seropositive rheumatoid arthritis of multiple sites ([facility] [address] M05.79    Easy bruising R23.8    Acquired hypothyroidism E03.9    Postmenopausal depression F32.89    Hypercholesterolemia E78.00    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 R61    Long term (current) use of systemic steroids Z79.52    Long term current use of immunosuppressive drug Z79.899    Essential hypertension I10    Controlled type 2 diabetes mellitus with complication, without long-term current use of insulin ([facility]) E11.8    Morbid obesity with BMI of 45.0-49.9, adult ([facility]) E66.01, Z68.42    Vitamin D deficiency E55.9    Osteopenia M85.80    Severe episode of recurrent major depressive disorder, without psychotic features ([facility] [address] F33.2         Depression Risk Factor Screening:     PHQ over the last two weeks 7/23/2015   Little interest or pleasure in doing things Nearly every day   Feeling down, depressed or hopeless Nearly every day   Total Score PHQ 2 6     Alcohol Risk Factor Screening: On any occasion during the past 3 months, have you had more than 3 drinks containing alcohol? No    Do you average more than 7 drinks per week? No      Functional Ability and Level of Safety:     Hearing Loss   normal-to-mild    Activities of Daily Living   Self-care. Requires assistance with: no ADLs    Fall Risk   No flowsheet data found. Abuse Screen   Patient is not abused    Review of Systems   A comprehensive review of systems was negative except for that written in the HPI. Physical Examination     No exam data present    Evaluation of Cognitive Function:  Mood/affect:  happy  Appearance: age appropriate  Family member/caregiver input: none    Visit Vitals    BP (!) 148/93    Pulse (!) 108    Temp 99 °F (37.2 °C) (Oral)    Resp 19    Ht 5' 4\" (1.626 m)    Wt 262 lb 3.2 oz (118.9 kg)    SpO2 95%    BMI 45.01 kg/m2     General:  Alert, cooperative, no distress, appears stated age. Head:  Normocephalic, without obvious abnormality, atraumatic. Eyes:  Conjunctivae/corneas clear. PERRL, EOMs intact. Fundi benign. Ears:  Normal TMs and external ear canals both ears. Nose: Nares normal. Septum midline. Mucosa normal. No drainage or sinus tenderness. Throat: Lips, mucosa, and tongue normal. Teeth and gums normal.   Neck: Supple, symmetrical, trachea midline, no adenopathy, thyroid: no enlargement/tenderness/nodules, no carotid bruit and no JVD. Back:   Symmetric, no curvature.  ROM normal. No CVA tenderness. Lungs:   Clear to auscultation bilaterally. Chest wall:  No tenderness or deformity. Heart:  Regular rate and rhythm, S1, S2 normal, no murmur, click, rub or gallop. Breast Exam:  No tenderness, masses, or nipple abnormality. Extremities: Extremities normal, atraumatic, no cyanosis or edema. Pulses: 2+ and symmetric all extremities. Skin: Skin color, texture, turgor normal. No rashes or lesions. Lymph nodes: Cervical, supraclavicular, and axillary nodes normal.   Neurologic: CNII-XII intact. Normal strength, sensation and reflexes throughout. Patient Care Team:  Melba Olivarez MD as PCP - General (Family Practice)  Lory Johnson MD (Obstetrics & Gynecology)  Ezio Durham MD as Physician (Obstetrics & Gynecology)  Tata Moran MD    Advice/Referrals/Counseling   Education and counseling provided:  End-of-Life planning (with patient's consent)  Spent more than 16 minutes discussing, explaining and completing the ACP. I made a copy and gave it to her    Assessment/Plan       ICD-10-CM ICD-9-CM    1. Routine general medical examination at a health care facility Z00.00 V70.0    2. Screening for alcoholism Z13.89 V79.1    3. Advanced directives, counseling/discussion Z71.89 V65.49 ADVANCE CARE PLANNING FIRST 30 MINS   4. Postmenopausal Z78.0 V49.81 DEXA BONE DENSITY STUDY AXIAL   5. Encounter for immunization Z23 V03.89 ADMIN PNEUMOCOCCAL VACCINE      PNEUMOCOCCAL POLYSACCHARIDE VACCINE, 23-VALENT, ADULT OR IMMUNOSUPPRESSED PT DOSE,   6. Colon cancer screening Z12.11 V76.51 OCCULT BLOOD, IMMUNOASSAY (FIT)   7. Morbid obesity with BMI of 45.0-49.9, adult (HCC) E66.01 278.01     Z68.42 V85.42    .

## 2017-07-19 RX ORDER — ACETAMINOPHEN 325 MG/1
650 TABLET ORAL ONCE
Status: COMPLETED | OUTPATIENT
Start: 2017-07-24 | End: 2017-07-24

## 2017-07-19 RX ORDER — ACETAMINOPHEN 325 MG/1
650 TABLET ORAL
Status: ACTIVE | OUTPATIENT
Start: 2017-07-24 | End: 2017-07-25

## 2017-07-19 RX ORDER — DIPHENHYDRAMINE HYDROCHLORIDE 50 MG/ML
50 INJECTION, SOLUTION INTRAMUSCULAR; INTRAVENOUS
Status: ACTIVE | OUTPATIENT
Start: 2017-07-24 | End: 2017-07-25

## 2017-07-19 RX ORDER — DIPHENHYDRAMINE HYDROCHLORIDE 50 MG/ML
50 INJECTION, SOLUTION INTRAMUSCULAR; INTRAVENOUS ONCE
Status: COMPLETED | OUTPATIENT
Start: 2017-07-24 | End: 2017-07-24

## 2017-07-24 ENCOUNTER — HOSPITAL ENCOUNTER (OUTPATIENT)
Dept: INFUSION THERAPY | Age: 62
Discharge: HOME | End: 2017-07-24
Payer: MEDICARE

## 2017-07-24 VITALS
TEMPERATURE: 97.3 F | SYSTOLIC BLOOD PRESSURE: 139 MMHG | DIASTOLIC BLOOD PRESSURE: 87 MMHG | BODY MASS INDEX: 45.73 KG/M2 | RESPIRATION RATE: 18 BRPM | OXYGEN SATURATION: 95 % | HEART RATE: 105 BPM | WEIGHT: 266.4 LBS

## 2017-07-24 PROCEDURE — 96415 CHEMO IV INFUSION ADDL HR: CPT

## 2017-07-24 PROCEDURE — 74011000258 HC RX REV CODE- 258: Performed by: INTERNAL MEDICINE

## 2017-07-24 PROCEDURE — 96375 TX/PRO/DX INJ NEW DRUG ADDON: CPT

## 2017-07-24 PROCEDURE — 74011250637 HC RX REV CODE- 250/637: Performed by: INTERNAL MEDICINE

## 2017-07-24 PROCEDURE — 96413 CHEMO IV INFUSION 1 HR: CPT

## 2017-07-24 PROCEDURE — 74011250636 HC RX REV CODE- 250/636: Performed by: INTERNAL MEDICINE

## 2017-07-24 RX ORDER — FAMOTIDINE 10 MG/ML
INJECTION INTRAVENOUS
Status: DISPENSED
Start: 2017-07-24 | End: 2017-07-24

## 2017-07-24 RX ORDER — DIPHENHYDRAMINE HYDROCHLORIDE 50 MG/ML
INJECTION, SOLUTION INTRAMUSCULAR; INTRAVENOUS
Status: DISPENSED
Start: 2017-07-24 | End: 2017-07-24

## 2017-07-24 RX ORDER — SODIUM CHLORIDE 0.9 % (FLUSH) 0.9 %
10 SYRINGE (ML) INJECTION AS NEEDED
Status: ACTIVE | OUTPATIENT
Start: 2017-07-24 | End: 2017-07-25

## 2017-07-24 RX ADMIN — Medication 10 ML: at 16:35

## 2017-07-24 RX ADMIN — ACETAMINOPHEN 650 MG: 325 TABLET ORAL at 10:33

## 2017-07-24 RX ADMIN — DIPHENHYDRAMINE HYDROCHLORIDE 50 MG: 50 INJECTION INTRAMUSCULAR; INTRAVENOUS at 10:34

## 2017-07-24 RX ADMIN — RITUXIMAB 1000 MG: 10 INJECTION, SOLUTION INTRAVENOUS at 11:50

## 2017-07-24 RX ADMIN — METHYLPREDNISOLONE SODIUM SUCCINATE 125 MG: 125 INJECTION, POWDER, FOR SOLUTION INTRAMUSCULAR; INTRAVENOUS at 10:35

## 2017-07-24 RX ADMIN — Medication 10 ML: at 10:15

## 2017-07-24 NOTE — PROGRESS NOTES
[facility] VISIT NOTE    1000  Pt arrived at [facility] ambulatory and in no distress for C1D1 of Rituxan. Assessment completed, pt c/o generalized joint pain rated at 8/10 on numeric pain scale. Patient also notes dry eyes and difficulty sleeping lately. No further issues voiced at this time. Blood pressure 131/88, pulse 99, temperature 98.3 °F (36.8 °C), resp. rate 20, weight 120.8 kg (266 lb 6.4 oz), SpO2 95 %. 24g PIV placed to right arm without difficulty by Kari Gaytan RN. Positive blood return noted. Medications received:  NS IV  Tylenol PO  Benadryl IVP  Solu-Medrol IVP  Rituxan IV (titrated per protocol)    Patient remained in OPIC for 30 minutes following treatment for observation. Bedside medication counseling about Rituxan provided by Markus Jones, PharmD. Patient verbalized understanding. Tolerated treatment well, no adverse reaction noted. PIV flushed and removed per protocol. Patient Vitals for the past 12 hrs:   Temp Pulse Resp BP SpO2   07/24/17 1635 97.3 °F (36.3 °C) (!) 105 18 139/87 -   07/24/17 1605 97.9 °F (36.6 °C) (!) 107 18 131/89 -   07/24/17 1550 97.9 °F (36.6 °C) (!) 105 18 (!) 138/95 -   07/24/17 1520 97.2 °F (36.2 °C) 100 18 (!) 138/92 -   07/24/17 1450 97.9 °F (36.6 °C) (!) 101 16 125/80 -   07/24/17 1420 99 °F (37.2 °C) 95 18 104/75 95 %   07/24/17 1350 97.7 °F (36.5 °C) 97 18 116/76 96 %   07/24/17 1320 98.2 °F (36.8 °C) 92 18 115/74 96 %   07/24/17 1220 98 °F (36.7 °C) 92 18 110/76 95 %   07/24/17 1002 98.3 °F (36.8 °C) 99 20 131/88 95 %       1635  D/C'd from [facility] ambulatory and in no distress. Next appointment is 8/7/17 at 1100.

## 2017-07-29 DIAGNOSIS — I10 ESSENTIAL HYPERTENSION: ICD-10-CM

## 2017-07-29 RX ORDER — CARVEDILOL 25 MG/1
TABLET ORAL
Qty: 180 TAB | Refills: 0 | Status: SHIPPED | OUTPATIENT
Start: 2017-07-29 | End: 2018-10-02 | Stop reason: SDUPTHER

## 2017-08-01 RX ORDER — ACETAMINOPHEN 325 MG/1
650 TABLET ORAL ONCE
Status: ACTIVE | OUTPATIENT
Start: 2017-08-07 | End: 2017-08-07

## 2017-08-01 RX ORDER — ACETAMINOPHEN 325 MG/1
650 TABLET ORAL
Status: ACTIVE | OUTPATIENT
Start: 2017-08-07 | End: 2017-08-07

## 2017-08-01 RX ORDER — DIPHENHYDRAMINE HYDROCHLORIDE 50 MG/ML
50 INJECTION, SOLUTION INTRAMUSCULAR; INTRAVENOUS ONCE
Status: ACTIVE | OUTPATIENT
Start: 2017-08-07 | End: 2017-08-07

## 2017-08-01 RX ORDER — DIPHENHYDRAMINE HYDROCHLORIDE 50 MG/ML
50 INJECTION, SOLUTION INTRAMUSCULAR; INTRAVENOUS
Status: ACTIVE | OUTPATIENT
Start: 2017-08-07 | End: 2017-08-07

## 2017-08-07 ENCOUNTER — HOSPITAL ENCOUNTER (OUTPATIENT)
Dept: INFUSION THERAPY | Age: 62
Discharge: HOME | End: 2017-08-07
Payer: MEDICARE

## 2017-08-07 ENCOUNTER — APPOINTMENT (OUTPATIENT)
Dept: INFUSION THERAPY | Age: 62
End: 2017-08-07
Payer: MEDICARE

## 2017-08-07 NOTE — PROGRESS NOTES
[facility] VISIT NOTE    Pt arrived at [facility] ambulatory and in no distress for D15  q 6 months Rituxan. Assessment completed, pt c/o . PIV initiated in arm with positive blood return noted. Medications received:  Acetaminophen PO  Diphenhydramine IV  Methylprednisolone IV  Rituxan IV (per titration protocol)    Tolerated treatment well, no adverse reaction noted. PIV de-accessed without difficulty and 2x2 applied with coban wrap. D/C'd from [facility] ambulatory and in no distress accompanied by . Next appointment is .

## 2017-08-09 ENCOUNTER — OFFICE VISIT (OUTPATIENT)
Dept: FAMILY MEDICINE CLINIC | Age: 62
End: 2017-08-09

## 2017-08-09 VITALS
TEMPERATURE: 98.6 F | BODY MASS INDEX: 45.72 KG/M2 | HEART RATE: 95 BPM | RESPIRATION RATE: 19 BRPM | HEIGHT: 64 IN | DIASTOLIC BLOOD PRESSURE: 82 MMHG | WEIGHT: 267.8 LBS | SYSTOLIC BLOOD PRESSURE: 119 MMHG | OXYGEN SATURATION: 96 %

## 2017-08-09 DIAGNOSIS — E11.8 CONTROLLED TYPE 2 DIABETES MELLITUS WITH COMPLICATION, WITHOUT LONG-TERM CURRENT USE OF INSULIN (HCC): Primary | ICD-10-CM

## 2017-08-09 DIAGNOSIS — G47.8 POOR SLEEP PATTERN: ICD-10-CM

## 2017-08-09 DIAGNOSIS — E66.01 MORBID OBESITY WITH BMI OF 45.0-49.9, ADULT (HCC): ICD-10-CM

## 2017-08-09 RX ORDER — TOPIRAMATE 25 MG/1
25 TABLET ORAL
Qty: 30 TAB | Refills: 6 | Status: SHIPPED | OUTPATIENT
Start: 2017-08-09 | End: 2018-01-24

## 2017-08-09 NOTE — MR AVS SNAPSHOT
Visit Information Date & Time Provider Department Dept. Phone Encounter #  
 8/9/2017  1:15 PM Gisel Alvarez MD [address] [place] [address] [id number] Follow-up Instructions Return in about 2 weeks (around 8/23/2017). Your Appointments 8/17/2017  1:40 PM  
ESTABLISHED PATIENT with Cece Hoover MD  
[address] ([facility]) Appt Note: fu 2 mo; -  
 [place] Counts include 234 beds at the [facility] [address]  
[phone]  
  
   
 [address] [address] Upcoming Health Maintenance Date Due  
 EYE EXAM RETINAL OR DILATED Q1 12/29/1963 FOBT Q 1 YEAR AGE 50-75 12/29/2003 BREAST CANCER SCRN MAMMOGRAM 8/19/2016 INFLUENZA AGE 9 TO ADULT 8/1/2017 HEMOGLOBIN A1C Q6M 12/12/2017 LIPID PANEL Q1 3/6/2018 FOOT EXAM Q1 6/12/2018 MICROALBUMIN Q1 6/12/2018 MEDICARE YEARLY EXAM 7/18/2018 PAP AKA CERVICAL CYTOLOGY 8/14/2020 DTaP/Tdap/Td series (2 - Td) 12/26/2026 Allergies as of 8/9/2017  Review Complete On: 8/9/2017 By: Angelina Patel LPN No Known Allergies Current Immunizations  Reviewed on 7/24/2017 Name Date Influenza Vaccine 9/6/2016 Influenza Vaccine (Quad) 11/19/2015  2:49 PM  
 Pneumococcal Polysaccharide (PPSV-23) 7/17/2017 Not reviewed this visit You Were Diagnosed With   
  
 Codes Comments Controlled type 2 diabetes mellitus with complication, without long-term current use of insulin ([facility] [address]    -  Primary ICD-10-CM: E11.8 ICD-9-CM: 250.90 Morbid obesity with BMI of 45.0-49.9, adult (HCC)     ICD-10-CM: E66.01, Z68.42 
ICD-9-CM: 278.01, V85.42 Poor sleep pattern     ICD-10-CM: G47.8 ICD-9-CM: 780.59 Vitals BP Pulse Temp Resp Height(growth percentile) Weight(growth percentile) 119/82 95 98.6 °F (37 °C) (Oral) 19 5' 4\" (1.626 m) 267 lb 12.8 oz (121.5 kg) SpO2 BMI OB Status Smoking Status 96% 45.97 kg/m2 Postmenopausal Former Smoker Vitals History BMI and BSA Data Body Mass Index Body Surface Area 45.97 kg/m 2 2.34 m 2 Preferred Pharmacy Pharmacy Name Phone [facility]etta [facility] [phone] Your Updated Medication List  
  
   
This list is accurate as of: 8/9/17  1:56 PM.  Always use your most recent med list.  
  
  
  
  
 alendronate 70 mg tablet Commonly known as:  FOSAMAX Take 70 mg by mouth every seven (7) days. atorvastatin 40 mg tablet Commonly known as:  LIPITOR  
TAKE 1 TABLET BY MOUTH DAILY  
  
 buPROPion  mg XL tablet Commonly known as:  WELLBUTRIN XL  
TAKE 1 TABLET BY MOUTH EVERY MORNING  
  
 CALCIUM 600 + D 600-125 mg-unit Tab Generic drug:  calcium-cholecalciferol (d3) Take  by mouth daily. carvedilol 25 mg tablet Commonly known as:  COREG  
TAKE 1 TABLET BY MOUTH TWICE DAILY DULoxetine 40 mg Cpdr  
TK 1 C PO QD  
  
 folic acid 1 mg tablet Commonly known as:  Google Take  by mouth daily. gabapentin 300 mg capsule Commonly known as:  NEURONTIN  
TAKE 1 CAPSULE BY MOUTH THREE TIMES DAILY  
  
 levothyroxine 100 mcg tablet Commonly known as:  SYNTHROID  
TAKE 1 TABLET BY MOUTH DAILY  
  
 methotrexate 2.5 mg tablet Commonly known as:  Joe Shiver Take 8 Tabs by mouth Every Saturday for 90 days. predniSONE 5 mg tablet Commonly known as:  Inocente Jabs Take 3 Tabs by mouth daily. PROAIR HFA 90 mcg/actuation inhaler Generic drug:  albuterol Take 2 Puffs by inhalation every four (4) hours as needed. topiramate 25 mg tablet Commonly known as:  TOPAMAX Take 1 Tab by mouth daily (with dinner). traMADol 50 mg tablet Commonly known as:  ULTRAM  
TAKE 1 TABLET BY MOUTH TWICE DAILY AS NEEDED. DAILY MAX  MGS  
  
 TRULICITY 2.19 FK/8.9 mL sub-q pen Generic drug:  dulaglutide INJ 1 SYRINGEFUL SC Q 7 DAYS  
  
 valsartan 80 mg tablet Commonly known as:  DIOVAN  
TAKE 1 TABLET BY MOUTH DAILY  
  
 VITAMIN D2 50,000 unit capsule Generic drug:  ergocalciferol Take 1 Cap by mouth every seven (7) days. Prescriptions Sent to Pharmacy Refills  
 topiramate (TOPAMAX) 25 mg tablet 6 Sig: Take 1 Tab by mouth daily (with dinner). Class: Normal  
 Pharmacy: [facility], [facility] [place] Ph #: [phone] Route: Oral  
  
We Performed the Following REFERRAL TO PSYCHOLOGY [PZT86 Custom] Comments:  
 Mr Dl Newell Excessive eating behaviors as a result of depressed and stress REFERRAL TO SLEEP STUDIES [REF99 Custom] Comments:  
 eval and treat for MIGUEL, waking every 2 hours, obese and large neck Follow-up Instructions Return in about 2 weeks (around 8/23/2017). Referral Information Referral ID Referred By Referred To  
  
 [id number] Per Morley Not Available Visits Status Start Date End Date 1 New Request 8/9/17 8/9/18 If your referral has a status of pending review or denied, additional information will be sent to support the outcome of this decision. Referral ID Referred By Referred To  
 [id number] [facility], [address] [place] [address], [facility] Daniel Lerma  Phone: [phone] Fax: [phone] Visits Status Start Date End Date 1 New Request 8/9/17 8/9/18 If your referral has a status of pending review or denied, additional information will be sent to support the outcome of this decision. Introducing [facility]! Dear Carla Landing: Thank you for requesting a WhatsOpen account. Our records indicate that you have previously registered for a WhatsOpen account but its currently inactive. Please call our WhatsOpen support line at [phone]. Additional Information If you have questions, please visit the Frequently Asked Questions section of the Wediv[employer] website at [email or web address] Remember, Savision is NOT to be used for urgent needs. For medical emergencies, dial 911. Now available from your iPhone and Android! Please provide this summary of care documentation to your next provider. Your primary care clinician is listed as Dane Britt. If you have any questions after today's visit, please call [phone].

## 2017-08-09 NOTE — PROGRESS NOTES
Weight Loss Progress Note: follow up Physician Visit      Ramón Pearl is a 59 y.o. female with BMI 45.97 who is here for her follow up  Evaluation for the medical bariatric care. Has been feeling so depressed that she has been in the bed most days and eating excessively to self medicate\  Today she had 2 boiled eggs and yogurt  Lunch will be chicken breast salad using no fat dressing and onions etc    Yesterday she had chocolate cake 8 pm for dinner- 2 big slices, bf she had oatmeal, lunch liver and squash. Her daughter brought the cake. Her last weight loss visit 7/17 she was 262. She says when she got home she immediately started over eating    She is taking wellbutrin and cymbalta. She is not sure if this is helping her. The trulicity does seem to decrease the appetite    CC: I want to be healthier    Weight History  Current weight 267 and BMI is Body mass index is 45.97 kg/(m^2). Goal weight 200,   Highest weight 272  (See weight gain time line scanned into media section of chart)        Significant Medical History    Update on sleep apnea and  CPAP sleeping 2 hours at a time. Ever had bariatric surgery: no    Pregnant or planning on becoming pregnant w/in 6 months: no         Significant Psychosocial History   Any history of drug abuse or dependence: no  Current Major Lifestyle Changes: no  Any potential unsupportive people: no          Social History  Social History   Substance Use Topics    Smoking status: Former Smoker     Packs/day: 0.50     Years: 20.00     Quit date: 1/1/2013    Smokeless tobacco: Never Used    Alcohol use No     How many times a week do you eat out? Do you drink any EtOH?  no   If so, how much? Do you have upcoming any travel in the next 6 weeks?  no   If so, what do you have planned?           Exercise  How many days a week do you currently exercise?  0 days  Have you ever been told by a physician not to exercise?  no      Objective  Visit Vitals    BP 119/82    Pulse 95    Temp 98.6 °F (37 °C) (Oral)    Resp 19    Ht 5' 4\" (1.626 m)    Wt 267 lb 12.8 oz (121.5 kg)    SpO2 96%    BMI 45.97 kg/m2       Bicep - (right ) circumference  Waist Circumference: See Weight Management Doc Flowsheet  Neck Circumference: See Weight Management Doc Flowsheet  Percent Body Fat: See Weight Management Doc Flowsheet  Weight Metrics 8/9/2017 8/9/2017 7/24/2017 7/17/2017 7/17/2017 6/27/2017 6/27/2017   Weight - 267 lb 12.8 oz 266 lb 6.4 oz - 262 lb 3.2 oz - 267 lb 9.6 oz   Neck Circ (inches) 18 - - 17 - 18 -   Waist Measure Inches 55.5 - - 52.5 - 55.5 -   Exercise Mins/week 0 - - - - 0 -   Body Fat % 48.9 - - - - - -   BMI - 45.97 kg/m2 45.73 kg/m2 - 45.01 kg/m2 - 45.93 kg/m2         Labs:     Review of Systems  Complete ROS negative except where noted above      Physical Exam    Vital Signs Reviewed  Weight Management Metrics Reviewed    Vital Signs Reviewed  Appearance: Obese, A&O x 3, NAD  HEENT:  NC/AT, EOMI, PERRL, No scleral icterus, malampatti score:  Skin:    Skin tags - no   Acanthosis Nigricans - no  Neck:  No nodes, thyromegaly   Heart:  RRR without M/R/G  Lungs:  CTAB, no rhonchi, rales, or wheezes with good air exchange   Abdomen:  Non-tender, pos bowel sounds; hepatomegaly -   Ext:  No C/C/E        Assessment & Plan  Diagnoses and all orders for this visit:    1. Controlled type 2 diabetes mellitus with complication, without long-term current use of insulin ([facility] [address]    2. Morbid obesity with BMI of 45.0-49.9, adult (HCC)  -     REFERRAL TO PSYCHOLOGY  -     REFERRAL TO SLEEP STUDIES  -     topiramate (TOPAMAX) 25 mg tablet; Take 1 Tab by mouth daily (with dinner). 3. Poor sleep pattern  -     REFERRAL TO SLEEP STUDIES        Based on his history and exam, Danyelle Tang is a good candidate for the  [facility]     Diet: go back to day 1-4    Activity: start 4 days a week at pool exercise    Medication: add topamax.  Cont trulicity    There are no Patient Instructions on file for this visit. Follow-up Disposition:  Return in about 2 weeks (around 8/23/2017). Over 50% of the 30 minutes face to face time with Gab Combs consisted of counseling & coordinating and/or discussing treatment plans in reference to her obesity The primary encounter diagnosis was Controlled type 2 diabetes mellitus with complication, without long-term current use of insulin ([address] Diagnoses of Morbid obesity with BMI of 45.0-49.9, adult ([address] and Poor sleep pattern were also pertinent to this visit.   The patient verbalizes understanding and agrees with the plan of care    Patient has the advanced directives  booklet to review

## 2017-08-09 NOTE — PROGRESS NOTES
1. Have you been to the ER, urgent care clinic since your last visit? Hospitalized since your last visit? No    2. Have you seen or consulted any other health care providers outside of the [address] [place] [address] since your last visit? Include any pap smears or colon screening.  No     Chief Complaint   Patient presents with    Weight Management     Body Weight: 267.8  Body Fat%: 48.9  Muscle Mass Weight: 45.9  Body Water Weight: 03.6  Basal Metabolic Rate: 2133  BMI: 45.97

## 2017-08-15 RX ORDER — ACETAMINOPHEN 325 MG/1
650 TABLET ORAL ONCE
Status: COMPLETED | OUTPATIENT
Start: 2017-08-18 | End: 2017-08-18

## 2017-08-15 RX ORDER — DIPHENHYDRAMINE HYDROCHLORIDE 50 MG/ML
50 INJECTION, SOLUTION INTRAMUSCULAR; INTRAVENOUS ONCE
Status: COMPLETED | OUTPATIENT
Start: 2017-08-18 | End: 2017-08-18

## 2017-08-15 RX ORDER — DIPHENHYDRAMINE HYDROCHLORIDE 50 MG/ML
50 INJECTION, SOLUTION INTRAMUSCULAR; INTRAVENOUS
Status: ACTIVE | OUTPATIENT
Start: 2017-08-18 | End: 2017-08-18

## 2017-08-15 RX ORDER — ACETAMINOPHEN 325 MG/1
650 TABLET ORAL
Status: ACTIVE | OUTPATIENT
Start: 2017-08-18 | End: 2017-08-18

## 2017-08-18 ENCOUNTER — HOSPITAL ENCOUNTER (OUTPATIENT)
Dept: INFUSION THERAPY | Age: 62
Discharge: HOME | End: 2017-08-18
Payer: MEDICARE

## 2017-08-18 VITALS
HEART RATE: 102 BPM | SYSTOLIC BLOOD PRESSURE: 140 MMHG | OXYGEN SATURATION: 96 % | DIASTOLIC BLOOD PRESSURE: 84 MMHG | TEMPERATURE: 98.9 F | RESPIRATION RATE: 18 BRPM

## 2017-08-18 PROCEDURE — 96415 CHEMO IV INFUSION ADDL HR: CPT

## 2017-08-18 PROCEDURE — 74011000258 HC RX REV CODE- 258: Performed by: INTERNAL MEDICINE

## 2017-08-18 PROCEDURE — 74011250636 HC RX REV CODE- 250/636: Performed by: INTERNAL MEDICINE

## 2017-08-18 PROCEDURE — 96375 TX/PRO/DX INJ NEW DRUG ADDON: CPT

## 2017-08-18 PROCEDURE — 74011250637 HC RX REV CODE- 250/637: Performed by: INTERNAL MEDICINE

## 2017-08-18 PROCEDURE — 96413 CHEMO IV INFUSION 1 HR: CPT

## 2017-08-18 RX ADMIN — METHYLPREDNISOLONE SODIUM SUCCINATE 125 MG: 125 INJECTION, POWDER, FOR SOLUTION INTRAMUSCULAR; INTRAVENOUS at 10:12

## 2017-08-18 RX ADMIN — ACETAMINOPHEN 650 MG: 325 TABLET ORAL at 10:12

## 2017-08-18 RX ADMIN — DIPHENHYDRAMINE HYDROCHLORIDE 50 MG: 50 INJECTION INTRAMUSCULAR; INTRAVENOUS at 10:12

## 2017-08-18 RX ADMIN — RITUXIMAB 1000 MG: 10 INJECTION, SOLUTION INTRAVENOUS at 11:00

## 2017-08-18 NOTE — PROGRESS NOTES
Outpatient Infusion Center - Chemotherapy Progress Note    1000 Pt admit to [facility] for C1D15 Rituxan ambulatory with cane assist in stable condition. Assessment completed. Pt states her dizziness has improved but still has fatigue and weakness. PIV with positive blood return and flush. Chemotherapy Flowsheet 8/18/2017   Cycle C1D15   Date 8/18/2017   Drug / Regimen Rituxan         Patient Vitals for the past 12 hrs:   Temp Pulse Resp BP SpO2   08/18/17 1530 98.9 °F (37.2 °C) (!) 102 18 140/84 96 %   08/18/17 1500 98.6 °F (37 °C) (!) 105 18 138/84 95 %   08/18/17 1430 98.4 °F (36.9 °C) 100 18 131/87 -   08/18/17 1400 98.7 °F (37.1 °C) (!) 102 18 120/80 -   08/18/17 1330 98.4 °F (36.9 °C) 100 18 131/83 95 %   08/18/17 1300 98.9 °F (37.2 °C) 63 18 135/90 100 %   08/18/17 1230 98.4 °F (36.9 °C) (!) 101 18 127/77 95 %   08/18/17 1200 99.2 °F (37.3 °C) 99 18 131/83 95 %   08/18/17 1130 98.7 °F (37.1 °C) 99 18 99/69 95 %   08/18/17 1050 98.4 °F (36.9 °C) (!) 107 18 132/84 -   08/18/17 0959 - (!) 119 - 139/90 -   08/18/17 0955 98.5 °F (36.9 °C) (!) 120 18 (!) 173/96 -         Medications:  NS IV  Tylenol PO  Benadryl IVP  Solumedrol IVP  Rituxan titrated per prtocol    1545 Pt tolerated treatment well. PIV maintained positive blood return throughout treatment, flushed with positive blood return at conclusion and deaccessed. Gauze and pressure bandage applied. D/c home ambulatory with cane assist in no distress.  Pt aware of next appointment scheduled for 2/16/17 at 1000

## 2017-08-22 ENCOUNTER — TELEPHONE (OUTPATIENT)
Dept: FAMILY MEDICINE CLINIC | Age: 62
End: 2017-08-22

## 2017-08-22 DIAGNOSIS — E11.9 TYPE 2 DIABETES MELLITUS WITHOUT COMPLICATION, WITHOUT LONG-TERM CURRENT USE OF INSULIN (HCC): Primary | ICD-10-CM

## 2017-08-22 RX ORDER — METFORMIN HYDROCHLORIDE 500 MG/1
500 TABLET ORAL 2 TIMES DAILY WITH MEALS
Qty: 60 TAB | Refills: 0 | Status: SHIPPED | OUTPATIENT
Start: 2017-08-22 | End: 2017-09-18 | Stop reason: SDUPTHER

## 2017-08-22 NOTE — TELEPHONE ENCOUNTER
PT stated that the Trulicity is too expensive. Can you please send her some pills that she can begin taking? She is on vacation in [place].  I have put the pharmacy information in the system

## 2017-08-28 ENCOUNTER — OFFICE VISIT (OUTPATIENT)
Dept: FAMILY MEDICINE CLINIC | Age: 62
End: 2017-08-28

## 2017-08-28 VITALS
TEMPERATURE: 98.7 F | RESPIRATION RATE: 21 BRPM | WEIGHT: 264.7 LBS | OXYGEN SATURATION: 96 % | HEIGHT: 64 IN | BODY MASS INDEX: 45.19 KG/M2 | DIASTOLIC BLOOD PRESSURE: 81 MMHG | SYSTOLIC BLOOD PRESSURE: 122 MMHG | HEART RATE: 107 BPM

## 2017-08-28 DIAGNOSIS — E66.01 MORBID OBESITY WITH BMI OF 45.0-49.9, ADULT (HCC): ICD-10-CM

## 2017-08-28 DIAGNOSIS — E11.8 CONTROLLED TYPE 2 DIABETES MELLITUS WITH COMPLICATION, WITHOUT LONG-TERM CURRENT USE OF INSULIN (HCC): Primary | ICD-10-CM

## 2017-08-28 DIAGNOSIS — I10 ESSENTIAL HYPERTENSION: ICD-10-CM

## 2017-08-28 DIAGNOSIS — E78.00 HYPERCHOLESTEROLEMIA: ICD-10-CM

## 2017-08-28 DIAGNOSIS — E55.9 VITAMIN D DEFICIENCY: ICD-10-CM

## 2017-08-28 RX ORDER — INSULIN PUMP SYRINGE, 3 ML
EACH MISCELLANEOUS
Qty: 1 KIT | Refills: 0 | Status: SHIPPED | OUTPATIENT
Start: 2017-08-28 | End: 2022-07-21 | Stop reason: SDUPTHER

## 2017-08-28 RX ORDER — LANCETS
EACH MISCELLANEOUS
Qty: 100 EACH | Refills: 3 | Status: SHIPPED | OUTPATIENT
Start: 2017-08-28 | End: 2017-10-25 | Stop reason: SDUPTHER

## 2017-08-28 NOTE — PROGRESS NOTES
Weight Loss Progress Note: follow up Physician Visit      Kane Delong is a 59 y.o. female with BMI , 45.44   who is here for her follow up  Evaluation for the medical bariatric care. She was on vacation the last 2 weeks. She was very active . Had icecream two days. CC: I want to be healthier    Weight History  Current weight 264 and BMI is Body mass index is 45.44 kg/(m^2). Goal weight 200  Highest weight 272  (See weight gain time line scanned into media section of chart)    -8 lbs    Significant Medical History    Update on sleep apnea and  CPAP using, 2 hours a night    Ever had bariatric surgery: no    Pregnant or planning on becoming pregnant w/in 6 months: no         Significant Psychosocial History   Any history of drug abuse or dependence: no  Current Major Lifestyle Changes: no  Any potential unsupportive people: no          Social History  Social History   Substance Use Topics    Smoking status: Former Smoker     Packs/day: 0.50     Years: 20.00     Quit date: 1/1/2013    Smokeless tobacco: Never Used    Alcohol use No     How many times a week do you eat out?  12    Do you drink any EtOH?  no   If so, how much? Do you have upcoming any travel in the next 6 weeks?  no   If so, what do you have planned?           Exercise  How many days a week do you currently exercise?  0 days  Have you ever been told by a physician not to exercise?  no      Objective  Visit Vitals    /81    Pulse (!) 107    Temp 98.7 °F (37.1 °C) (Oral)    Resp 21    Ht 5' 4\" (1.626 m)    Wt 264 lb 11.2 oz (120.1 kg)    SpO2 96%    BMI 45.44 kg/m2       Bicep - (right ) circumference  Waist Circumference: See Weight Management Doc Flowsheet  Neck Circumference: See Weight Management Doc Flowsheet  Percent Body Fat: See Weight Management Doc Flowsheet  Weight Metrics 8/28/2017 8/28/2017 8/9/2017 8/9/2017 7/24/2017 7/17/2017 7/17/2017   Weight - 264 lb 11.2 oz - 267 lb 12.8 oz 266 lb 6.4 oz - 262 lb 3.2 oz   Neck Circ (inches) 18 - 18 - - 17 -   Waist Measure Inches 53 - 55.5 - - 52.5 -   Exercise Mins/week - - 0 - - - -   Body Fat % 48.9 - 48.9 - - - -   BMI - 45.44 kg/m2 - 45.97 kg/m2 45.73 kg/m2 - 45.01 kg/m2         Labs:       Review of Systems  Complete ROS negative except where noted above      Physical Exam    Vital Signs Reviewed  Weight Management Metrics Reviewed    Vital Signs Reviewed  Appearance: Obese, A&O x 3, NAD  HEENT:  NC/AT, EOMI, PERRL, No scleral icterus, malampatti score:4  Skin:    Skin tags - no   Acanthosis Nigricans -   Neck:  No nodes, thyromegaly   Heart:  RRR without M/R/G  Lungs:  CTAB, no rhonchi, rales, or wheezes with good air exchange   Abdomen:  Non-tender, pos bowel sounds; hepatomegaly -   Ext:  No C/C/E        Assessment & Plan  Diagnoses and all orders for this visit:    1. Controlled type 2 diabetes mellitus with complication, without long-term current use of insulin (HCC)  -     Blood-Glucose Meter (BLOOD GLUCOSE MONITORING) monitoring kit; E11.8  To check blood sugar daily  -     Lancets misc; E11.8 Check blood sugar once daily  -     glucose blood VI test strips (BLOOD GLUCOSE TEST) strip; E11.8  Check blood sugar daily  -     dapagliflozin (FARXIGA) 5 mg tab tablet; Take 1 Tab by mouth daily. 2. Morbid obesity with BMI of 45.0-49.9, adult ([facility])  Diet: day 10-14    Activity: I am asking for daily 15-20 min    Medication: metformin , the trulicity no longer covered. 3. Hypercholesterolemia  monitoring  4. Vitamin D deficiency  Will check with the next labs  5. Essential hypertension   BP great control. No change in meds      Based on his history and exam, Lo Potts is a good candidate for the  [facility] Loss [facility]           There are no Patient Instructions on file for this visit. Follow-up Disposition:  Return in about 2 weeks (around 9/11/2017).     Over 50% of the 30 minutes face to face time with Iraida Jaramillo consisted of counseling & coordinating and/or discussing treatment plans in reference to her obesity The primary encounter diagnosis was Controlled type 2 diabetes mellitus with complication, without long-term current use of insulin ([address] Diagnoses of Morbid obesity with BMI of 45.0-49.9, adult ([address] Hypercholesterolemia, Vitamin D deficiency, and Essential hypertension were also pertinent to this visit.   The patient verbalizes understanding and agrees with the plan of care    Patient has the advanced directives  booklet to review

## 2017-08-28 NOTE — MR AVS SNAPSHOT
Visit Information Date & Time Provider Department Dept. Phone Encounter #  
 8/28/2017  1:30 PM Melissa Dolan MD [address] [place] [address] [id number] Follow-up Instructions Return in about 2 weeks (around 9/11/2017). Your Appointments 9/13/2017  2:20 PM  
ESTABLISHED PATIENT with Lavern Jorgensen MD  
[address] ([facility]) Appt Note: 1 month f/up  
 [address] [place] [address]  
[phone]  
  
   
 [facility] [address] Upcoming Health Maintenance Date Due  
 EYE EXAM RETINAL OR DILATED Q1 12/29/1965 FOBT Q 1 YEAR AGE 50-75 12/29/2003 BREAST CANCER SCRN MAMMOGRAM 8/19/2016 INFLUENZA AGE 9 TO ADULT 8/1/2017 HEMOGLOBIN A1C Q6M 12/12/2017 LIPID PANEL Q1 3/6/2018 FOOT EXAM Q1 6/12/2018 MICROALBUMIN Q1 6/12/2018 MEDICARE YEARLY EXAM 7/18/2018 PAP AKA CERVICAL CYTOLOGY 8/14/2020 DTaP/Tdap/Td series (2 - Td) 12/26/2026 Allergies as of 8/28/2017  Review Complete On: 8/28/2017 By: Melissa Dolan MD  
 No Known Allergies Current Immunizations  Reviewed on 8/18/2017 Name Date Influenza Vaccine 9/6/2016 Influenza Vaccine (Quad) 11/19/2015  2:49 PM  
 Pneumococcal Polysaccharide (PPSV-23) 7/17/2017 Not reviewed this visit You Were Diagnosed With   
  
 Codes Comments Morbid obesity with BMI of 45.0-49.9, adult ([facility]    -  Primary ICD-10-CM: E66.01, Z68.42 
ICD-9-CM: 278.01, V85.42 Controlled type 2 diabetes mellitus with complication, without long-term current use of insulin (HCC)     ICD-10-CM: E11.8 ICD-9-CM: 250.90 Hypercholesterolemia     ICD-10-CM: E78.00 ICD-9-CM: 272.0 Vitamin D deficiency     ICD-10-CM: E55.9 ICD-9-CM: 268.9 Essential hypertension     ICD-10-CM: I10 
ICD-9-CM: 401.9 Vitals BP Pulse Temp Resp Height(growth percentile) Weight(growth percentile) 122/81 (!) 107 98.7 °F (37.1 °C) (Oral) 21 5' 4\" (1.626 m) 264 lb 11.2 oz (120.1 kg) SpO2 BMI OB Status Smoking Status 96% 45.44 kg/m2 Postmenopausal Former Smoker Vitals History BMI and BSA Data Body Mass Index Body Surface Area 45.44 kg/m 2 2.33 m 2 Preferred Pharmacy Pharmacy Name Phone [facility] [address] [address] [phone] Your Updated Medication List  
  
   
This list is accurate as of: 8/28/17  2:10 PM.  Always use your most recent med list.  
  
  
  
  
 alendronate 70 mg tablet Commonly known as:  FOSAMAX Take 70 mg by mouth every seven (7) days. atorvastatin 40 mg tablet Commonly known as:  LIPITOR  
TAKE 1 TABLET BY MOUTH DAILY Blood-Glucose Meter monitoring kit Commonly known as:  BLOOD GLUCOSE MONITORING  
E11.8 To check blood sugar daily buPROPion  mg XL tablet Commonly known as:  WELLBUTRIN XL  
TAKE 1 TABLET BY MOUTH EVERY MORNING  
  
 CALCIUM 600 + D 600-125 mg-unit Tab Generic drug:  calcium-cholecalciferol (d3) Take  by mouth daily. carvedilol 25 mg tablet Commonly known as:  COREG  
TAKE 1 TABLET BY MOUTH TWICE DAILY  
  
 dapagliflozin 5 mg Tab tablet Commonly known as:  [employer] Take 1 Tab by mouth daily. DULoxetine 40 mg Cpdr  
TK 1 C PO QD  
  
 folic acid 1 mg tablet Commonly known as:  [employer] Take  by mouth daily. gabapentin 300 mg capsule Commonly known as:  NEURONTIN  
TAKE 1 CAPSULE BY MOUTH THREE TIMES DAILY  
  
 glucose blood VI test strips strip Commonly known as:  blood glucose test  
E11.8 Check blood sugar daily Lancets Misc E11.8 Check blood sugar once daily  
  
 levothyroxine 100 mcg tablet Commonly known as:  SYNTHROID  
TAKE 1 TABLET BY MOUTH DAILY  
  
 metFORMIN 500 mg tablet Commonly known as:  GLUCOPHAGE Take 1 Tab by mouth two (2) times daily (with meals). methotrexate 2.5 mg tablet Commonly known as:  Jerrilyn Roosevelt Take 8 Tabs by mouth Every Saturday for 90 days. predniSONE 5 mg tablet Commonly known as:  Marcus Aver Take 3 Tabs by mouth daily. PROAIR HFA 90 mcg/actuation inhaler Generic drug:  albuterol Take 2 Puffs by inhalation every four (4) hours as needed. topiramate 25 mg tablet Commonly known as:  TOPAMAX Take 1 Tab by mouth daily (with dinner). traMADol 50 mg tablet Commonly known as:  ULTRAM  
TAKE 1 TABLET BY MOUTH TWICE DAILY AS NEEDED. DAILY MAX  MGS  
  
 TRULICITY 4.32 ZB/1.0 mL sub-q pen Generic drug:  dulaglutide INJ 1 SYRINGEFUL SC Q 7 DAYS  
  
 valsartan 80 mg tablet Commonly known as:  DIOVAN  
TAKE 1 TABLET BY MOUTH DAILY  
  
 VITAMIN D2 50,000 unit capsule Generic drug:  ergocalciferol Take 1 Cap by mouth every seven (7) days. Prescriptions Printed Refills Blood-Glucose Meter (BLOOD GLUCOSE MONITORING) monitoring kit 0 Sig: E11.8 To check blood sugar daily Class: Print Lancets misc 3 Sig: E11.8 Check blood sugar once daily Class: Print  
 glucose blood VI test strips (BLOOD GLUCOSE TEST) strip 3 Sig: E11.8 Check blood sugar daily Class: Print Prescriptions Sent to Pharmacy Refills  
 dapagliflozin (FARXIGA) 5 mg tab tablet 3 Sig: Take 1 Tab by mouth daily. Class: Normal  
 Pharmacy: [facility] [address] [place] [address] Drive Ph #: [phone] Route: Oral  
  
Follow-up Instructions Return in about 2 weeks (around 9/11/2017). To-Do List   
 02/16/2018 10:00 AM  
  Appointment with Maribel Chaudhary [address] at [place] [address] ([phone])  
  
 08/17/2018 10:00 AM  
  Appointment with Maribel Chaudhary [address] at [place] [address] ([phone]) Introducing [facility]! Dear Kaiden Perry: Thank you for requesting a LearnUp account. Our records indicate that you have previously registered for a LearnUp account but its currently inactive. Please call our LearnUp support line at [phone]. Additional Information If you have questions, please visit the Frequently Asked Questions section of the LearnUp website at [email or web address] Remember, LearnUp is NOT to be used for urgent needs. For medical emergencies, dial 911. Now available from your iPhone and Android! Please provide this summary of care documentation to your next provider. Your primary care clinician is listed as Sagrario Bravo. If you have any questions after today's visit, please call [phone].

## 2017-08-28 NOTE — PROGRESS NOTES
1. Have you been to the ER, urgent care clinic since your last visit? Hospitalized since your last visit? No    2. Have you seen or consulted any other health care providers outside of the [facility] since your last visit? Include any pap smears or colon screening.  No    Chief Complaint   Patient presents with    Weight Management     Body Weight: 264.7  Body Fat%: 48.9  Muscle Mass Weight: 45.4  Body Water Weight: 74.4  Basal Metabolic Rate: 4201  BMI: 45.44

## 2017-09-01 RX ORDER — PREDNISONE 5 MG/1
10 TABLET ORAL DAILY
Qty: 60 TAB | Refills: 0 | Status: SHIPPED | OUTPATIENT
Start: 2017-09-01 | End: 2017-10-01

## 2017-09-23 LAB — HEMOCCULT STL QL IA: NORMAL

## 2017-09-25 ENCOUNTER — TELEPHONE (OUTPATIENT)
Dept: FAMILY MEDICINE CLINIC | Age: 62
End: 2017-09-25

## 2017-09-25 NOTE — TELEPHONE ENCOUNTER
Aubrey Briceño in regards to FIT test results and technician stated that it could not be resulted due to the age of the sample. Pt will have to redo.

## 2017-09-27 NOTE — TELEPHONE ENCOUNTER
Spoke with pt and advised she will have to repeat FIT test. Pt verbalized understanding and stated she will pick-up kit at next appt on 10/2.

## 2017-10-02 DIAGNOSIS — Z12.11 SCREENING FOR COLON CANCER: Primary | ICD-10-CM

## 2017-10-12 ENCOUNTER — TELEPHONE (OUTPATIENT)
Dept: FAMILY MEDICINE CLINIC | Age: 62
End: 2017-10-12

## 2017-10-12 NOTE — TELEPHONE ENCOUNTER
Patient called asking for a Referral to [facility] for an eye infection her apt is [facility]: 10.13.17 with Dr.Christopher Wadsworth    Please let me know once this order is in the system    thanks

## 2017-10-13 DIAGNOSIS — H44.009 EYE INFECTION, UNSPECIFIED LATERALITY: Primary | ICD-10-CM

## 2017-10-24 ENCOUNTER — OFFICE VISIT (OUTPATIENT)
Dept: RHEUMATOLOGY | Age: 62
End: 2017-10-24

## 2017-10-24 VITALS
HEIGHT: 64 IN | BODY MASS INDEX: 46.26 KG/M2 | DIASTOLIC BLOOD PRESSURE: 74 MMHG | HEART RATE: 102 BPM | RESPIRATION RATE: 18 BRPM | WEIGHT: 271 LBS | TEMPERATURE: 98.6 F | SYSTOLIC BLOOD PRESSURE: 129 MMHG

## 2017-10-24 DIAGNOSIS — M77.52 LEFT ANKLE TENDONITIS: ICD-10-CM

## 2017-10-24 DIAGNOSIS — Z79.52 LONG TERM (CURRENT) USE OF SYSTEMIC STEROIDS: ICD-10-CM

## 2017-10-24 DIAGNOSIS — Z79.60 LONG-TERM USE OF IMMUNOSUPPRESSANT MEDICATION: ICD-10-CM

## 2017-10-24 DIAGNOSIS — M05.79 SEROPOSITIVE RHEUMATOID ARTHRITIS OF MULTIPLE SITES (HCC): Primary | ICD-10-CM

## 2017-10-24 DIAGNOSIS — E55.9 VITAMIN D DEFICIENCY: ICD-10-CM

## 2017-10-24 DIAGNOSIS — R06.09 DYSPNEA ON EXERTION: ICD-10-CM

## 2017-10-24 DIAGNOSIS — M35.9 SYSTEMIC INVOLVEMENT OF CONNECTIVE TISSUE (HCC): ICD-10-CM

## 2017-10-24 DIAGNOSIS — M85.89 OSTEOPENIA OF MULTIPLE SITES: ICD-10-CM

## 2017-10-24 RX ORDER — PREDNISONE 5 MG/1
TABLET ORAL
Qty: 135 TAB | Refills: 0 | Status: SHIPPED | OUTPATIENT
Start: 2017-10-24 | End: 2018-01-24

## 2017-10-24 RX ORDER — PREDNISONE 5 MG/1
15 TABLET ORAL DAILY
COMMUNITY
End: 2017-10-24 | Stop reason: SDUPTHER

## 2017-10-24 RX ORDER — METHOTREXATE 2.5 MG/1
20 TABLET ORAL
COMMUNITY
End: 2019-08-28 | Stop reason: SDUPTHER

## 2017-10-24 NOTE — PROGRESS NOTES
REASON FOR VISIT    This is a follow-up visit for Ms. Johnson for Seropositive Rheumatoid Arthritis. Inflammatory arthritis phenotype includes:  Anti-CCP positive: yes (73)  Rheumatoid factor positive: yes (86.2)  Erosive disease: no  Extra-articular manifestations include: none    Immunosuppression Screening (10/13/2016):   Quantiferon TB: negative  PPD:  Not performed  Hepatitis B: negative  Hepatitis C: negative    Therapy History includes:    Current DMARD therapy includes: methotrexate 20 mg every Saturday, rituximab (8/07/2017-8/18/2017)  Prior DMARD therapy includes: methotrexate, Arava, Enbrel, Humira, Orencia, Actemra infusion (12/16/2016-7/2017)   The following DMARDs have been ineffective: methotrexate (6 months), Onencia SQ (4 months), Actemra   The following DMARDs were stopped because of side effects: Arava (hepatotoxicity), Humira (elbow infection from MRSA s/p 3 surgeries)     Osteoporosis Historical Synopsis     Height loss since age 25 (at least two inches): 2.5  Fracture history includes: no  Family history of hip fracture: no  Fall Risk: yes     Daily calcium intake is 1,000 mg  Daily vitamin D intake is 50,000 per week     Smoking history: yes (former smoker of 22 years)  Alcohol consumption: no  Prednisone history: yes (25 mg for more than several years)     Exercise: yes     Previous work-up for osteoporosis includes the following:  DEXA Scan: 2015  Vitamin 25OH D level: 27.9 (3/06/2017)  TSH: 0.1.920 (3/06/2017)  PTH: 38 (10/13/2016)     Therapy History includes:     Current osteoporosis therapy includes: alendronate 70 mg weekly  Prior osteoporosis therapy includes: none  The following osteoporosis have been ineffective: none  The following osteoporosis were stopped because of side effects: none    Immunizations:   Immunization History   Administered Date(s) Administered    Influenza Vaccine 09/06/2016    Influenza Vaccine (Quad) 11/19/2015    Pneumococcal Polysaccharide (PPSV-23) 07/17/2017       Active problems include:    Patient Active Problem List   Diagnosis Code    Arthritis of knee M17.10    Lumbar stenosis with neurogenic claudication M48.062    Seropositive rheumatoid arthritis of multiple sites ([facility] M05.79    Easy bruising R23.8    Acquired hypothyroidism E03.9    Postmenopausal depression F32.89    Hypercholesterolemia E78.00    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 R61    Long term (current) use of systemic steroids Z79.52    Long term current use of immunosuppressive drug Z79.899    Essential hypertension I10    Controlled type 2 diabetes mellitus with complication, without long-term current use of insulin ([facility]) E11.8    Morbid obesity with BMI of 45.0-49.9, adult ([facility]) E66.01, Z68.42    Vitamin D deficiency E55.9    Osteopenia M85.80    Severe episode of recurrent major depressive disorder, without psychotic features ([facility] F33.2       HISTORY OF PRESENT ILLNESS    Ms. Jamaal Lawler returns for a follow-up visit. On her last visit, I continued her methotrexate to 20 mg weekly Saturday and changed Rituximab from Actemra infusions due to lack of efficacy and increased her prednisone to 15 mg due to active synovitis. Today, she complains of pain in her hands when she picks up objections. She has stiffness lasting an hour. She has pain in inner aspect of left ankle. She complains of dyspnea with walking. PFTs were ordered but she did not go. Last toxicity monitoring by blood work was done on 6/21/2017 and did not reveal any significant adverse effects, except ALT 54 (previously 120). Most recent inflammatory markers from 6/05/2017 revealed a ESR 1 mm/hr (previously 27 mm/hr) and CRP 0.29 mg/L (previously 3.8 mg/L). The patient has not had any interval hospital admissions or surgeries but has had infections. Stephen Braswell     REVIEW OF SYSTEMS    A comprehensive review of systems was performed and pertinent results are documented in the HPI, review of systems is otherwise non-contributory. PAST MEDICAL HISTORY    She has a past medical history of Anxiety; Arrhythmia; Arthritis; Arthritis of knee (1/30/2012); Autoimmune disease ([facility] Bronchitis; Chronic pain; Depression; Dyslipidemia; Morbid obesity ([facility] Nausea & vomiting; Other screening mammogram (8/19/15); Overweight(278.02); Papanicolaou smear for cervical cancer screening (8/14/15); Rheumatoid aortitis; and SVT (supraventricular tachycardia) ([facility] (1993). FAMILY HISTORY    Her family history includes Breast Cancer in an other family member; Diabetes in her brother and mother; Heart Disease in her brother and father. SOCIAL HISTORY    She reports that she quit smoking about 4 years ago. She has a 10.00 pack-year smoking history. She has never used smokeless tobacco. She reports that she does not drink alcohol or use illicit drugs. MEDICATIONS    Current Outpatient Prescriptions   Medication Sig Dispense Refill    methotrexate (RHEUMATREX) 2.5 mg tablet Take 20 mg by mouth Every Saturday.  predniSONE (DELTASONE) 5 mg tablet Take 2 tablets daily for 4 weeks then 1.5 tablet for 4 weeks then 1 tablet daily 135 Tab 0    RITUXIMAB (RITUXAN IV) by IntraVENous route.  metFORMIN (GLUCOPHAGE) 500 mg tablet TAKE 1 TABLET BY MOUTH TWICE DAILY WITH MEALS 60 Tab 2    Blood-Glucose Meter (BLOOD GLUCOSE MONITORING) monitoring kit E11.8  To check blood sugar daily 1 Kit 0    Lancets misc E11.8 Check blood sugar once daily 100 Each 3    glucose blood VI test strips (BLOOD GLUCOSE TEST) strip E11.8  Check blood sugar daily 100 Strip 3    topiramate (TOPAMAX) 25 mg tablet Take 1 Tab by mouth daily (with dinner). 30 Tab 6    carvedilol (COREG) 25 mg tablet TAKE 1 TABLET BY MOUTH TWICE DAILY 414 Tab 0    folic acid (FOLVITE) 1 mg tablet Take  by mouth daily.  VITAMIN D2 50,000 unit capsule Take 1 Cap by mouth every seven (7) days.  12 Cap 3    PROAIR HFA 90 mcg/actuation inhaler Take 2 Puffs by inhalation every four (4) hours as needed. 1 Inhaler 2    valsartan (DIOVAN) 80 mg tablet TAKE 1 TABLET BY MOUTH DAILY 90 Tab 3    atorvastatin (LIPITOR) 40 mg tablet TAKE 1 TABLET BY MOUTH DAILY 90 Tab 3    levothyroxine (SYNTHROID) 100 mcg tablet TAKE 1 TABLET BY MOUTH DAILY 90 Tab 3    buPROPion XL (WELLBUTRIN XL) 300 mg XL tablet TAKE 1 TABLET BY MOUTH EVERY MORNING 90 Tab 1    gabapentin (NEURONTIN) 300 mg capsule TAKE 1 CAPSULE BY MOUTH THREE TIMES DAILY 270 Cap 1    DULoxetine 40 mg cpDR TK 1 C PO QD  1    alendronate (FOSAMAX) 70 mg tablet Take 70 mg by mouth every seven (7) days. 3    calcium-cholecalciferol, d3, (CALCIUM 600 + D) 600-125 mg-unit tab Take  by mouth daily.  dapagliflozin (FARXIGA) 5 mg tab tablet Take 1 Tab by mouth daily. 30 Tab 5    traMADol (ULTRAM) 50 mg tablet TAKE 1 TABLET BY MOUTH TWICE DAILY AS NEEDED. DAILY MAX  MGS 30 Tab 0        ALLERGIES    No Known Allergies    PHYSICAL EXAMINATION    Visit Vitals    /74 (BP 1 Location: Left arm, BP Patient Position: Sitting)    Pulse (!) 102    Temp 98.6 °F (37 °C)    Resp 18    Ht 5' 4\" (1.626 m)    Wt 271 lb (122.9 kg)    BMI 46.52 kg/m2     Body mass index is 46.52 kg/(m^2). General: Patient is alert, oriented x 3, not in acute distress, diaphoretic. HEENT:   Sclerae are not injected and appear moist.  Oral mucous membranes are moist, there are no ulcers present. There is no alopecia. Neck is supple. Cardiovascular:  Heart is regular rate and rhythm, no murmurs. Chest:  Lungs are clear to auscultation bilaterally. No rhonchi, wheezes, or crackles. Abdomen:  Obese. Extremities:  Free of clubbing, cyanosis, edema    Neurological exam:  No focal sensory deficits, muscle strength is full in upper and lower extremities     Skin exam:  There are no alopecia, no discoid lesions, no active Raynaud's, no livedo reticularis, no periungual erythema.     Multiple small skin tears and bruising    Musculoskeletal exam:  A comprehensive musculoskeletal exam was performed for all joints of each upper and lower extremity and assessed for swelling, tenderness and range of motion.  Positive results are documented as below:    Bilateral CMC crepitus  Left Elbow flexion contracture  Decreased of ROM of wrists  Right ankle tenderness (RESOLVED)  Left posterior tibilais tendon tenderness    Joint Count 10/24/2017 6/21/2017 3/6/2017 1/17/2017 11/3/2016 10/13/2016   Patient pain (0-100) - 85 70 35 80 85   MHAQ - 1.75 1.5 1.5 1.38 1.63   Left shoulder - Tender - 1 - - - -   Left elbow - Tender - 1 - - - 1   Left elbow - Swollen - 1 - - - 1   Left wrist- Tender 1 - - - - 1   Left wrist- Swollen 1 1 - - - 1   Left 1st MCP - Tender 1 1 - - - 1   Left 1st MCP - Swollen 1 1 - - - 1   Left 2nd MCP - Tender 1 1 - - - 1   Left 2nd MCP - Swollen 1 1 - - - 1   Left 3rd MCP - Tender 1 1 - - - 1   Left 3rd MCP - Swollen 1 1 - - - 1   Left 4th MCP - Tender - 1 - - - 1   Left 4th MCP - Swollen - 1 - - - 1   Left 5th MCP - Tender - 1 - - - 1   Left 5th MCP - Swollen - - - - - 1   Left thumb IP - Tender - - - - - 1   Left thumb IP - Swollen - - - - - 1   Left 2nd PIP - Tender - 1 - - - 1   Left 2nd PIP - Swollen - 1 - - - 1   Left 3rd PIP - Tender 1 1 - - - 1   Left 3rd PIP - Swollen 1 1 - - - 1   Left 4th PIP - Tender 1 1 - - - 1   Left 4th PIP - Swollen - 1 - - - -   Left 5th PIP - Tender - - - - - 1   Right shoulder - Tender - 1 - - - -   Right elbow - Tender - 1 - - - 1   Right elbow - Swollen - - - - - 1   Right wrist- Tender 1 1 - - - 1   Right wrist- Swollen 1 1 - - - 1   Right 1st MCP - Tender 1 - - - - 1   Right 1st MCP - Swollen 1 - - - - 1   Right 2nd MCP - Tender 1 1 - - - 1   Right 2nd MCP - Swollen 1 - - - - 1   Right 3rd MCP - Tender 1 1 - - - 1   Right 3rd MCP - Swollen 1 1 - - - 1   Right 4th MCP - Tender - 1 - - - 1   Right 4th MCP - Swollen - - - - - 1   Right 5th MCP - Tender - 1 - - - 1   Right 5th MCP - Swollen - - - - - 1   Right thumb IP - Tender 1 - - - - 1   Right thumb IP - Swollen - - - - - 1   Right 2nd PIP - Tender 1 1 - - - 1   Right 2nd PIP - Swollen 1 1 - - - 1   Right 3rd PIP - Tender 1 1 - - - 1   Right 3rd PIP - Swollen 1 1 - - - 1   Right 4th PIP - Tender - 1 - - - 1   Right 4th PIP - Swollen - 1 - - - 1   Right 5th PIP - Tender - - - - - 1   Right 5th PIP - Swollen - - - - - 1   Tender Joint Count (Total) 13 20 - - - -   Swollen Joint Count (Total) 11 14 - - - -   Physician Assessment (0-10) - 6 - - - 9   Patient Assessment (0-10) - 9 7.5 6 7 9   CDAI Total (calculated) - 49 - - - -       DATA REVIEW    Laboratory     The following laboratory results were reviewed and discussed with the patient:    Office Visit on 07/17/2017   Component Date Value    Occult blood fecal, by IA 07/17/2017 CANCELED        Imaging    Musculoskeletal Ultrasound    None    Radiographs    Chest 6/21/2017: Lungs: The lungs are clear of mass, nodule, airspace disease or edema. Pleura: There is no pleural effusion or pneumothorax. Mediastinum: The cardiac and mediastinal contours and pulmonary vascularity are normal.  The aorta is atherosclerotic. Bones and soft tissues: There are mild degenerative changes of the spine. Left Elbow 10/13/2016: mild to moderate diffuse joint space loss with moderate-sized marginal osteophytes. An elbow effusion is evident. Bones are moderately osteopenic. No acute fracture or dislocation is shown. Bilateral Hand 10/13/2016: moderate diffuse osteopenia. Bilateral carpal soft tissue swelling is shown which is greater on the right. Severe joint space loss is demonstrated in the radiocarpal joints bilaterally with scapholunate advanced collapse of the right. Right capitate abuts the distal radius. Incongruence on the left is shown between the scaphoid and lunate with mild interspace widening indicating early scapholunate advanced collapse.  Small right bilateral distal radioulnar joint osteophytes are shown. A 2 to 3 mm loose body is shown on the right. There is moderate bilateral first CMC osteoarthrosis with lateral subluxation and tiny marginal osteophytes. Mild to moderate uniform joint space narrowing is shown throughout the metacarpophalangeal joints with only minimal bilateral second and left third MCP joint osteophyte formation. Mild MCP level bilateral soft tissue swelling is present. Digital assessment shows mild joint space narrowing of the right second and third and the left third DIP joints. Tiny osteophytes are shown at these articulations as well as a small periosteal calcification along the medial aspect of the right ring finger middle phalangeal head. No definite erosions are identified nor is there soft tissue calcifications indicative of calcium pyrophosphate dihydrate deposition. Bilateral Foot 10/13/2016: chronic appearing fracture at the base of the right fifth metatarsal bone with fracture gap widening measuring up to 6 mm and mild adjacent soft tissue swelling. There is no other evidence for acute or chronic fracture or dislocation. Mild bilateral first metatarsophalangeal joint osteoarthrosis is demonstrated with tiny marginal osteophytes. Mild osseous hypertrophy of the medial first metatarsal heads is shown bilaterally with nonmarginal erosions with sclerotic margins and mild overlying soft tissue swelling. There is also mild valgus at posterior first MTP joints would lateral subluxation of the hallux sesamoid bones. Soft tissue swelling also overlies the fifth metatarsal heads bilaterally. On the right, there are nonmarginal erosions measuring up to 6 mm in size, also sharply demarcated with sclerotic margins. Mild joint space loss of the left fifth metatarsophalangeal joint is demonstrated with tiny marginal osteophytes. Several discrete nonmarginal erosions are shown medially and laterally in the third metatarsal head with mild MTP joint space loss on the right. First through third tarsometatarsal joint space narrowing is present bilaterally with subcortical demineralization at the right third metatarsal base. Mild overlying dorsal soft tissue swelling is shown. The patient is status post resection of the left second metatarsal head. Extensive atherosclerotic calcifications are shown. Pelvic 1/07/2016: bilateral hip prostheses are partially visualized and in satisfactory alignment. There is no acute fracture. Lumbosacral fusion hardware is intact. 1/07/2016:    Lumbar 7/11/2014: recent laminectomy and instrumentation from , with placement of rods and pedicle screws. The hardware appears intact and hardware position is appropriate. Degenerative disc disease is present from L2-S1. There is grade 1 spondylolisthesis at L4-5 which is probably chronic. There is no evidence of vertebral compression fracture. CT Imaging    CTA chest with and without contrast 7/10/2014: no mediastinal, axillary or hilar lymphadenopathy. There is no pleural or pericardial effusion. The aorta is normal in course and caliber. The proximal pulmonary arteries are without evidence of filling defects, the caliber of the pulmonary arteries is also normal. The pulmonary parenchyma is unremarkable. No lytic or blastic lesions are identified. Subcutaneous edema in the soft tissues. Hepatic steatosis. No aortic aneurysm. No pulmonary embolism. The remainder of the upper abdomen visualized is unremarkable    MR Imaging    None    DXA     DXA 6/25/2015: (Excluded sites: prosthetic hips, L3 and L4 for hardware and both hips for hardware) lumbar spine L1-L2 T score -0.2 (BMD 1.150 g/cm2). PROCEDURE     Kenalog 80 mg IM. (06/21/17)     ASSESSMENT AND PLAN    This is a follow-up visit for Ms. Johnson. 1) Seropositive Rheumatoid Arthritis. She is tolerating methotrexate 20 mg every Saturday and received one cycle of rituximab in 8/2017, which replaced Actemra infusions due to lack of improvement.  She is on prednisone 15 mg daily. Her CDAI was N/A (previously 49) with 13 tender and 11 swollen joints, which has improved from her last visit, but this is while on prednisone 15 mg. I will continue methotrexate and rituximab and will wean her prednisone down. See #2. 2) Long Term Use of Systemic Steroids. She is on 15 mg, which was increased from 10 mg while waiting to start rituximab. I instructed her to reduce her dose to 10 mg for 4 weeks, then 7.5 mg for 4 weeks, and then 5 mg daily. 3) Fibromyalgia. This was not an active issue today. 4) Long Term Use of Immunosuppressants. The patient remains on immunomodulatory medications (methotrexate, rituximab) and requires frequent toxicity monitoring by blood work. Respective labs were ordered (CBC and CMP).    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 She is on alendronate 70 mg weekly with good tolerance. She takes 1000 mg of calcium daily and ergocalciferol. I asked her to schedule her DXA.    6) Morbid Obesity. Her BMI was 46.52 (previously 46.69, 46.17,  45.32, 43.69, 44.46).    7) Hypertension. Her blood pressure was 144/92 (previously 149/89, 151/104, 160/112). She is on Coreg 12.5 mg twice daily. I recommend increasing to 25 mg twice daily, valsartan 80 mg daily.    8) Vitamin D Deficiency. Her vitamin D was 27.9 (24.1). She is on ergocalciferol 50,000 weekly. I refilled it.      9) Hypercholesterolemia. Her cholesterol was 170 (previously 215). She is on Lipitor 80 mg.       10) Constant Diaphoresis. This has recurred after resuming prednisone 15 mg    11) Major Depression. This is improving. She follow with a therapist and an NP psychiatrist.      12) Diabetes Mellitus Type II. Her HbA1c was 7.3% (previously 6.6%)    13) Left posterior tibilais tendon tenderness. I recommend PT.    14) Dyspnea. I ordered pulmonary function tests. The patient voiced understanding of the aforementioned assessment and plan.  Summary of plan was provided in the After Visit Summary patient instructions.      TODAY'S ORDERS    Orders Placed This Encounter    QUANTIFERON TB GOLD    XR CHEST PA LAT    CBC WITH AUTOMATED DIFF    METABOLIC PANEL, COMPREHENSIVE    C REACTIVE PROTEIN, QT    SED RATE (ESR)    VITAMIN D, 25 HYDROXY    T AND B LYMPHOCYTE PANEL    PFT COMPLETE    PFT DLCO    methotrexate (RHEUMATREX) 2.5 mg tablet    predniSONE (DELTASONE) 5 mg tablet    RITUXIMAB (RITUXAN IV)       Future Appointments  Date Time Provider Kenn Eller   10/25/2017 3:15 PM Lanae Osgood, MD [address] [place] [address]   1/24/2018 1:40 PM Marcie Mcintosh MD [facility]   2/16/2018 10:00 AM [place] INFUSION NURSE 3 [facility]   8/17/2018 10:00 AM [place] INFUSION NURSE 3 [address]     Jono Anne MD, [address] [facility]    Adult Rheumatology   Musculoskeletal Ultrasound Certified  [address]   Phone [phone]  Fax [phone]

## 2017-10-24 NOTE — MR AVS SNAPSHOT
Visit Information Date & Time Provider Department Dept. Phone Encounter #  
 10/24/2017  1:40 PM Santosh Atkinson MD [phone] Follow-up Instructions Return in about 3 months (around 1/24/2018). Your Appointments 10/25/2017  3:15 PM  
ROUTINE CARE with MD Chantal Humphrey. Mulugeta Packer 90 [facility] [facility]) Appt Note: WM F/U; WM F/U  
 [address] [address] Upcoming Health Maintenance Date Due  
 EYE EXAM RETINAL OR DILATED Q1 12/29/1963 BREAST CANCER SCRN MAMMOGRAM 8/19/2016 INFLUENZA AGE 9 TO ADULT 8/1/2017 HEMOGLOBIN A1C Q6M 12/12/2017 LIPID PANEL Q1 3/6/2018 FOOT EXAM Q1 6/12/2018 MICROALBUMIN Q1 6/12/2018 FOBT Q 1 YEAR AGE 50-75 7/17/2018 MEDICARE YEARLY EXAM 7/18/2018 PAP AKA CERVICAL CYTOLOGY 8/14/2020 DTaP/Tdap/Td series (2 - Td) 12/26/2026 Allergies as of 10/24/2017  Review Complete On: 10/24/2017 By: Santosh Atkinson MD  
 No Known Allergies Current Immunizations  Reviewed on 8/18/2017 Name Date Influenza Vaccine 9/6/2016 Influenza Vaccine (Quad) 11/19/2015  2:49 PM  
 Pneumococcal Polysaccharide (PPSV-23) 7/17/2017 Not reviewed this visit You Were Diagnosed With   
  
 Codes Comments Seropositive rheumatoid arthritis of multiple sites [facility])    -  Primary ICD-10-CM: M05.79 ICD-9-CM: 714.0 Lumbar stenosis with neurogenic claudication     ICD-10-CM: M48.062 
ICD-9-CM: 724.03 Long term current use of immunosuppressive drug     ICD-10-CM: Z79.899 ICD-9-CM: V58.69 Dyspnea on exertion     ICD-10-CM: R06.09 
ICD-9-CM: 786.09 Vitamin D deficiency     ICD-10-CM: E55.9 ICD-9-CM: 268.9 Systemic involvement of connective tissue (HCC)     ICD-10-CM: M35.9 ICD-9-CM: 710.9 Vitals BP Pulse Temp Resp Height(growth percentile) Weight(growth percentile) 129/74 (BP 1 Location: Left arm, BP Patient Position: Sitting) (!) 102 98.6 °F (37 °C) 18 5' 4\" (1.626 m) 271 lb (122.9 kg) BMI OB Status Smoking Status 46.52 kg/m2 Postmenopausal Former Smoker BMI and BSA Data Body Mass Index Body Surface Area  
 46.52 kg/m 2 2.36 m 2 Preferred Pharmacy Pharmacy Name Phone [address] [phone] Your Updated Medication List  
  
   
This list is accurate as of: 10/24/17  2:18 PM.  Always use your most recent med list.  
  
  
  
  
 alendronate 70 mg tablet Commonly known as:  FOSAMAX Take 70 mg by mouth every seven (7) days. atorvastatin 40 mg tablet Commonly known as:  LIPITOR  
TAKE 1 TABLET BY MOUTH DAILY Blood-Glucose Meter monitoring kit Commonly known as:  BLOOD GLUCOSE MONITORING  
E11.8 To check blood sugar daily buPROPion  mg XL tablet Commonly known as:  WELLBUTRIN XL  
TAKE 1 TABLET BY MOUTH EVERY MORNING  
  
 CALCIUM 600 + D 600-125 mg-unit Tab Generic drug:  calcium-cholecalciferol (d3) Take  by mouth daily. carvedilol 25 mg tablet Commonly known as:  COREG  
TAKE 1 TABLET BY MOUTH TWICE DAILY  
  
 dapagliflozin 5 mg Tab tablet Commonly known as:  [employer] Take 1 Tab by mouth daily. DULoxetine 40 mg Cpdr  
TK 1 C PO QD  
  
 folic acid 1 mg tablet Commonly known as:  Google Take  by mouth daily. gabapentin 300 mg capsule Commonly known as:  NEURONTIN  
TAKE 1 CAPSULE BY MOUTH THREE TIMES DAILY  
  
 glucose blood VI test strips strip Commonly known as:  blood glucose test  
E11.8 Check blood sugar daily Lancets Misc E11.8 Check blood sugar once daily  
  
 levothyroxine 100 mcg tablet Commonly known as:  SYNTHROID  
TAKE 1 TABLET BY MOUTH DAILY metFORMIN 500 mg tablet Commonly known as:  GLUCOPHAGE  
TAKE 1 TABLET BY MOUTH TWICE DAILY WITH MEALS  
  
 methotrexate 2.5 mg tablet Commonly known as:  Cruzito Madhuri Take 20 mg by mouth Every Saturday. predniSONE 5 mg tablet Commonly known as:  Toan Dally Take 2 tablets daily for 4 weeks then 1.5 tablet for 4 weeks then 1 tablet daily PROAIR HFA 90 mcg/actuation inhaler Generic drug:  albuterol Take 2 Puffs by inhalation every four (4) hours as needed. RITUXAN IV  
by IntraVENous route. topiramate 25 mg tablet Commonly known as:  TOPAMAX Take 1 Tab by mouth daily (with dinner). traMADol 50 mg tablet Commonly known as:  ULTRAM  
TAKE 1 TABLET BY MOUTH TWICE DAILY AS NEEDED. DAILY MAX  MGS  
  
 valsartan 80 mg tablet Commonly known as:  DIOVAN  
TAKE 1 TABLET BY MOUTH DAILY  
  
 VITAMIN D2 50,000 unit capsule Generic drug:  ergocalciferol Take 1 Cap by mouth every seven (7) days. Prescriptions Sent to Pharmacy Refills  
 predniSONE (DELTASONE) 5 mg tablet 0 Sig: Take 2 tablets daily for 4 weeks then 1.5 tablet for 4 weeks then 1 tablet daily Class: Normal  
 Pharmacy: [facility], [address] [facility] Ph #: [phone] We Performed the Following C REACTIVE PROTEIN, QT [94382 CPT(R)] CBC WITH AUTOMATED DIFF [70938 CPT(R)] METABOLIC PANEL, COMPREHENSIVE [11093 CPT(R)] QUANTIFERON TB GOLD [[id number] Custom] SED RATE (ESR) [id number] CPT(R)] T AND B LYMPHOCYTE PANEL [id number] CPT(R)] VITAMIN D, 25 HYDROXY [id number] CPT(R)] Follow-up Instructions Return in about 3 months (around 1/24/2018). To-Do List   
 10/24/2017 PFT:  PFT COMPLETE   
  
 10/24/2017 PFT:  PFT DLCO   
  
 10/24/2017   Imaging:  XR CHEST PA LAT   
  
 02/16/2018 10:00 AM  
  Appointment with [address] at [place] [address] ([phone])  
  
 08/17/2018 10:00 AM  
  Appointment with Maribel Chaudhary [address] 3 at [facility] ([phone]) Patient Instructions Take 2 tablets daily for 4 weeks then 1.5 tablet for 4 weeks then 1 tablet daily Please call the Patient Care Scheduling Team [phone] to schedule your (pulmonary function tests) test. 
 
 
 
  
Introducing [facility] & [facility] SERVICES! Dear Sarah Roy: Thank you for requesting a [employer] account. Our records indicate that you have previously registered for a [employer] account but its currently inactive. Please call our [employer] support line at [phone]. Additional Information If you have questions, please visit the Frequently Asked Questions section of the [employer] website at [email or web address] Remember, [employer] is NOT to be used for urgent needs. For medical emergencies, dial 911. Now available from your iPhone and Android! Please provide this summary of care documentation to your next provider. Your primary care clinician is listed as Alex Harris. If you have any questions after today's visit, please call [phone].

## 2017-10-24 NOTE — PATIENT INSTRUCTIONS
Take 2 tablets daily for 4 weeks then 1.5 tablet for 4 weeks then 1 tablet daily    Please call the Patient Care Scheduling Team [phone] to schedule your (pulmonary function tests) test.

## 2017-10-25 ENCOUNTER — OFFICE VISIT (OUTPATIENT)
Dept: FAMILY MEDICINE CLINIC | Age: 62
End: 2017-10-25

## 2017-10-25 VITALS
HEIGHT: 64 IN | DIASTOLIC BLOOD PRESSURE: 78 MMHG | SYSTOLIC BLOOD PRESSURE: 112 MMHG | BODY MASS INDEX: 46.49 KG/M2 | TEMPERATURE: 98.6 F | RESPIRATION RATE: 21 BRPM | HEART RATE: 90 BPM | WEIGHT: 272.3 LBS | OXYGEN SATURATION: 93 %

## 2017-10-25 DIAGNOSIS — E66.01 MORBID OBESITY WITH BMI OF 45.0-49.9, ADULT (HCC): ICD-10-CM

## 2017-10-25 DIAGNOSIS — E11.8 CONTROLLED TYPE 2 DIABETES MELLITUS WITH COMPLICATION, WITHOUT LONG-TERM CURRENT USE OF INSULIN (HCC): Primary | ICD-10-CM

## 2017-10-25 DIAGNOSIS — Z23 ENCOUNTER FOR IMMUNIZATION: ICD-10-CM

## 2017-10-25 DIAGNOSIS — R06.83 SNORING: ICD-10-CM

## 2017-10-25 RX ORDER — LANCETS
EACH MISCELLANEOUS
Qty: 100 EACH | Refills: 3 | Status: SHIPPED | OUTPATIENT
Start: 2017-10-25 | End: 2018-10-24 | Stop reason: SDUPTHER

## 2017-10-25 NOTE — PROGRESS NOTES
Weight Loss Progress Note: follow up Physician Visit      Khoi Myers is a 59 y.o. female with BMI , 46.74who is here for her follow up  Evaluation for the medical bariatric care. CC: I want to get healthier    She has a membership at [employer] and at [employer] as well as a pool near her. She only takes metformin for DM  She could not afford the farxiga  She could not get covered for trulicity either      Weight History  Current weight 272 and BMI is Body mass index is 46.74 kg/(m^2). Goal weight 200  Highest weight  272  (See weight gain time line scanned into media section of chart)        Significant Medical History    Update on sleep apnea and  CPAP she says she does not have a machine    Ever had bariatric surgery: no    Pregnant or planning on becoming pregnant w/in 6 months: no         Significant Psychosocial History   Any history of drug abuse or dependence: no  Current Major Lifestyle Changes: no  Any potential unsupportive people: no          Social History  Social History   Substance Use Topics    Smoking status: Former Smoker     Packs/day: 0.50     Years: 20.00     Quit date: 1/1/2013    Smokeless tobacco: Never Used    Alcohol use No     How many times a week do you eat out? 2    Do you drink any EtOH?  no   If so, how much? Do you have upcoming any travel in the next 6 weeks?  no   If so, what do you have planned?           Exercise  How many days a week do you currently exercise?  0 days  Have you ever been told by a physician not to exercise?  no      Objective  Visit Vitals    /78    Pulse 90    Temp 98.6 °F (37 °C) (Oral)    Resp 21    Ht 5' 4\" (1.626 m)    Wt 272 lb 4.8 oz (123.5 kg)    SpO2 93%    BMI 46.74 kg/m2       Bicep - (right ) circumference  Waist Circumference: See Weight Management Doc Flowsheet  Neck Circumference: See Weight Management Doc Flowsheet  Percent Body Fat: See Weight Management Doc Flowsheet  Weight Metrics 10/25/2017 10/24/2017 8/28/2017 8/28/2017 8/9/2017 8/9/2017 7/24/2017   Weight 272 lb 4.8 oz 271 lb - 264 lb 11.2 oz - 267 lb 12.8 oz 266 lb 6.4 oz   Neck Circ (inches) - - 18 - 18 - -   Waist Measure Inches - - 53 - 55.5 - -   Exercise Mins/week - - - - 0 - -   Body Fat % - - 48.9 - 48.9 - -   BMI 46.74 kg/m2 46.52 kg/m2 - 45.44 kg/m2 - 45.97 kg/m2 45.73 kg/m2         Labs:     Review of Systems  Complete ROS negative except where noted above      Physical Exam    Vital Signs Reviewed  Weight Management Metrics Reviewed    Vital Signs Reviewed  Appearance: Obese, A&O x 3, NAD  HEENT:  NC/AT, EOMI, PERRL, No scleral icterus, malampatti score:  Skin:    Skin tags - no   Acanthosis Nigricans -   Neck:  No nodes, thyromegaly   Heart:  RRR without M/R/G  Lungs:  CTAB, no rhonchi, rales, or wheezes with good air exchange   Abdomen:  Non-tender, pos bowel sounds; hepatomegaly -   Ext:  No C/C/E        Assessment & Plan  Diagnoses and all orders for this visit:    1. Controlled type 2 diabetes mellitus with complication, without long-term current use of insulin (HCC)  -     glucose blood VI test strips (BLOOD GLUCOSE TEST) strip; E11.8 Check blood sugar daily  -     Lancets misc; E11.8 Check blood sugar once daily  -     HEMOGLOBIN A1C WITH EAG  Tapering off steroids  2. Morbid obesity with BMI of 45.0-49.9, adult ([address]  Still on steroids but tapering off. Diet:document  All foods and activity    Activity: document activity    Medication: on wellbutrin    3. Snoring  -     SLEEP MEDICINE REFERRAL    4. Encounter for immunization  -     INFLUENZA VIRUS VACCINE QUADRIVALENT, PRESERVATIVE FREE SYRINGE (98021)        Based on his history and exam, Dash Boudreaux is  a good candidate for the  [facility]       There are no Patient Instructions on file for this visit.     Follow-up Disposition: Not on File    Over 50% of the 30 minutes face to face time with Ileana consisted of counseling & coordinating and/or discussing treatment plans in reference to her obesity The primary encounter diagnosis was Controlled type 2 diabetes mellitus with complication, without long-term current use of insulin ([address] Diagnoses of Morbid obesity with BMI of 45.0-49.9, adult ([address] Snoring, and Encounter for immunization were also pertinent to this visit.   The patient verbalizes understanding and agrees with the plan of care    Patient has the advanced directives  booklet to review

## 2017-10-25 NOTE — MR AVS SNAPSHOT
Visit Information Date & Time Provider Department Dept. Phone Encounter #  
 10/25/2017  3:15 PM Terell Griffin MD [address] [place] [address] [id number] Your Appointments 1/24/2018  1:40 PM  
ESTABLISHED PATIENT with Saige Yadav MD  
[address] ([address]) Appt Note: 3 mo f/u  
 [address] [facility] [address]  
[phone]  
  
   
 [address] [facility] [address] [address] Upcoming Health Maintenance Date Due  
 EYE EXAM RETINAL OR DILATED Q1 12/29/1963 BREAST CANCER SCRN MAMMOGRAM 8/19/2016 HEMOGLOBIN A1C Q6M 12/12/2017 LIPID PANEL Q1 3/6/2018 FOOT EXAM Q1 6/12/2018 MICROALBUMIN Q1 6/12/2018 FOBT Q 1 YEAR AGE 50-75 7/17/2018 MEDICARE YEARLY EXAM 7/18/2018 PAP AKA CERVICAL CYTOLOGY 8/14/2020 DTaP/Tdap/Td series (2 - Td) 12/26/2026 Allergies as of 10/25/2017  Review Complete On: 10/25/2017 By: Benita Cage LPN No Known Allergies Current Immunizations  Reviewed on 8/18/2017 Name Date Influenza Vaccine 9/6/2016 Influenza Vaccine (Quad) 11/19/2015  2:49 PM  
 Influenza Vaccine (Quad) PF  Incomplete Pneumococcal Polysaccharide (PPSV-23) 7/17/2017 Not reviewed this visit You Were Diagnosed With   
  
 Codes Comments Controlled type 2 diabetes mellitus with complication, without long-term current use of insulin ([facility]    -  Primary ICD-10-CM: E11.8 ICD-9-CM: 250.90 Morbid obesity with BMI of 45.0-49.9, adult (HCC)     ICD-10-CM: E66.01, Z68.42 
ICD-9-CM: 278.01, V85.42 Snoring     ICD-10-CM: R06.83 
ICD-9-CM: 786.09 Encounter for immunization     ICD-10-CM: T58 ICD-9-CM: V03.89 Vitals BP Pulse Temp Resp Height(growth percentile) Weight(growth percentile) 112/78 90 98.6 °F (37 °C) (Oral) 21 5' 4\" (1.626 m) 272 lb 4.8 oz (123.5 kg) SpO2 BMI OB Status Smoking Status 93% 46.74 kg/m2 Postmenopausal Former Smoker Vitals History BMI and BSA Data Body Mass Index Body Surface Area 46.74 kg/m 2 2.36 m 2 Preferred Pharmacy Pharmacy Name Phone Renetta García [phone] Your Updated Medication List  
  
   
This list is accurate as of: 10/25/17  3:37 PM.  Always use your most recent med list.  
  
  
  
  
 alendronate 70 mg tablet Commonly known as:  FOSAMAX Take 70 mg by mouth every seven (7) days. atorvastatin 40 mg tablet Commonly known as:  LIPITOR  
TAKE 1 TABLET BY MOUTH DAILY Blood-Glucose Meter monitoring kit Commonly known as:  BLOOD GLUCOSE MONITORING  
E11.8 To check blood sugar daily buPROPion  mg XL tablet Commonly known as:  WELLBUTRIN XL  
TAKE 1 TABLET BY MOUTH EVERY MORNING  
  
 CALCIUM 600 + D 600-125 mg-unit Tab Generic drug:  calcium-cholecalciferol (d3) Take  by mouth daily. carvedilol 25 mg tablet Commonly known as:  COREG  
TAKE 1 TABLET BY MOUTH TWICE DAILY  
  
 dapagliflozin 5 mg Tab tablet Commonly known as:  [employer] Take 1 Tab by mouth daily. DULoxetine 40 mg Cpdr  
TK 1 C PO QD  
  
 folic acid 1 mg tablet Commonly known as:  Google Take  by mouth daily. gabapentin 300 mg capsule Commonly known as:  NEURONTIN  
TAKE 1 CAPSULE BY MOUTH THREE TIMES DAILY  
  
 glucose blood VI test strips strip Commonly known as:  blood glucose test  
E11.8 Check blood sugar daily Lancets Misc E11.8 Check blood sugar once daily  
  
 levothyroxine 100 mcg tablet Commonly known as:  SYNTHROID  
TAKE 1 TABLET BY MOUTH DAILY  
  
 metFORMIN 500 mg tablet Commonly known as:  GLUCOPHAGE  
TAKE 1 TABLET BY MOUTH TWICE DAILY WITH MEALS  
  
 methotrexate 2.5 mg tablet Commonly known as:  Glenora Alderman Take 20 mg by mouth Every Saturday. predniSONE 5 mg tablet Commonly known as:  Phillip Meza Take 2 tablets daily for 4 weeks then 1.5 tablet for 4 weeks then 1 tablet daily PROAIR HFA 90 mcg/actuation inhaler Generic drug:  albuterol Take 2 Puffs by inhalation every four (4) hours as needed. RITUXAN IV  
by IntraVENous route. topiramate 25 mg tablet Commonly known as:  TOPAMAX Take 1 Tab by mouth daily (with dinner). valsartan 80 mg tablet Commonly known as:  DIOVAN  
TAKE 1 TABLET BY MOUTH DAILY  
  
 VITAMIN D2 50,000 unit capsule Generic drug:  ergocalciferol Take 1 Cap by mouth every seven (7) days. Prescriptions Sent to Pharmacy Refills  
 glucose blood VI test strips (BLOOD GLUCOSE TEST) strip 3 Sig: E11.8 Check blood sugar daily Class: Normal  
 Pharmacy: [place] [facility], [address] [facility] [employer] Ph #: [phone] Lancets misc 3 Sig: E11.8 Check blood sugar once daily Class: Normal  
 Pharmacy: [place] [facility] [address], [address] [facility] [address] Ph #: [phone] We Performed the Following HEMOGLOBIN A1C WITH EAG [21289 CPT(R)] INFLUENZA VIRUS VAC QUAD,SPLIT,PRESV FREE SYRINGE IM [id number] CPT(R)] SLEEP MEDICINE REFERRAL [XYV617 Custom] Comments:  
 eval for MIGUEL, malampatti 4, neck 19 \", To-Do List   
 11/08/2017 1:00 PM  
(Arrive by 12:30 PM) Appointment with PULMONARY LAB [facility] at [facility] PULMONARY LAB ([phone]) 1. Bring list of current medications or bag medicines. 2. No breathing medicines 6 hours before the procedure. 3. Patient should eat light the day of procedure. 11/10/2017 1:00 PM  
  Appointment with [facility] CHRISTIANO 1 at [facility] ([phone]) Shower or bathe using soap and water.   Do not use deodorant, powder, perfumes, or lotion the day of your exam.  If your prior mammograms were not performed at [facility] 6 please bring films with you or forward prior images 2 days before your procedure. Check in at registration 15min before your appointment time unless you were instructed to do otherwise. A script is not necessary, but if you have one, please bring it on the day of the mammogram or have it faxed to the department. [facility] [phone] [address] [place] [address]  [phone] [facility] [phone] [facility]  [phone] [facility] [phone] 1 [facility] [phone] [facility] [phone] [facility] [phone]  
  
 11/10/2017 1:30 PM  
  Appointment with [facility] DEXA 1 at [facility] ([phone]) Please, no calcium supplements or antacids that coat the stomach (ex: Tums, Mylanta) 24 hours prior to procedure. Maintain normal diet and medications. Dairy products are allowed. Wear an outfit with an elastic waistband (no zipper or metal snaps). Check in at registration 15min before your appointment time unless you were instructed to do otherwise. 02/16/2018 10:00 AM  
  Appointment with [address] at [place] [address] ([phone])  
  
 08/17/2018 10:00 AM  
  Appointment with [address] at [place] [address] ([phone]) Referral Information Referral ID Referred By Referred To [id number] Bryan Holguin MD   
   [facility] [address]  Phone: [phone] Fax: [phone] Visits Status Start Date End Date 1 New Request 10/25/17 10/25/18 If your referral has a status of pending review or denied, additional information will be sent to support the outcome of this decision. Introducing [facility] & HEALTH SERVICES! Dear [facility]: Thank you for requesting a [employer] account. Our records indicate that you have previously registered for a [employer] account but its currently inactive. Please call our [employer] support line at [phone]. Additional Information If you have questions, please visit the Frequently Asked Questions section of the [employer] website at [email or web address] Remember, [employer] is NOT to be used for urgent needs. For medical emergencies, dial 911. Now available from your iPhone and Android! Please provide this summary of care documentation to your next provider. Your primary care clinician is listed as [facility]. If you have any questions after today's visit, please call [phone].

## 2017-10-25 NOTE — PROGRESS NOTES
1. Have you been to the ER, urgent care clinic since your last visit? Hospitalized since your last visit? No    2. Have you seen or consulted any other health care providers outside of the [address] [place] [address] since your last visit? Include any pap smears or colon screening.  No      Chief Complaint   Patient presents with    Weight Management     Body Weight: 272.3  Body Fat%: 49.2  Muscle Mass Weight: 46.7  Body Water Weight: 98.5  Basal Metabolic Rate: 0691  BMI: 46.74

## 2017-10-26 LAB
EST. AVERAGE GLUCOSE BLD GHB EST-MCNC: 186 MG/DL
HBA1C MFR BLD: 8.1 % (ref 4.8–5.6)

## 2017-10-30 DIAGNOSIS — M05.79 SEROPOSITIVE RHEUMATOID ARTHRITIS OF MULTIPLE SITES (HCC): ICD-10-CM

## 2017-10-30 RX ORDER — FOLIC ACID 1 MG/1
TABLET ORAL
Qty: 90 TAB | Refills: 0 | Status: SHIPPED | OUTPATIENT
Start: 2017-10-30 | End: 2018-04-24 | Stop reason: SDUPTHER

## 2017-10-31 LAB
HEMOCCULT STL QL IA: NEGATIVE
PLEASE NOTE:, 188601: NORMAL

## 2017-11-02 ENCOUNTER — TELEPHONE (OUTPATIENT)
Dept: FAMILY MEDICINE CLINIC | Age: 62
End: 2017-11-02

## 2017-11-02 RX ORDER — BUPROPION HYDROCHLORIDE 300 MG/1
TABLET ORAL
Qty: 30 TAB | Refills: 0 | Status: SHIPPED | OUTPATIENT
Start: 2017-11-02 | End: 2017-12-02 | Stop reason: SDUPTHER

## 2017-11-02 NOTE — TELEPHONE ENCOUNTER
----- Message from Mejia Galloway sent at 11/1/2017  3:33 PM EDT -----  Regarding: FW: Dr. Cam Madera telephone  Contact: [phone]      ----- Message -----     From: Sheri Mendez     Sent: 11/1/2017   3:23 PM       To: [facility] Front Office  Subject: Dr. Cam Madera telephone                             Pt is requesting a call back to see if she can get a prescription call in to pharmacy for bupropion. Pt was getting fill through another physician she was previously seeing. Pt has one pill left. Pt also wanted a antibiotic called in for a wound on leg that is now possibly infected. [facility].  Pt's best contact number is

## 2017-11-02 NOTE — TELEPHONE ENCOUNTER
Attempted to contact pt twice to advise that she will be to be seen in office before MD can prescribe an ABT. Unable to LVM due to VM being full. Medication that pt had requested has been sent to pt pharmacy.

## 2017-11-07 NOTE — PROGRESS NOTES
The blood sugar control has gotten worse than it was 4 months ago. Keep the record of your blood sugars from now until I see you on the 15th.  We will discuss this more at that time

## 2017-11-09 NOTE — PROGRESS NOTES
Spoke with pt and advised of lab results/recommednations. Pt verbalized understanding and no further questions.

## 2017-11-24 ENCOUNTER — OFFICE VISIT (OUTPATIENT)
Dept: FAMILY MEDICINE CLINIC | Age: 62
End: 2017-11-24

## 2017-11-24 VITALS
TEMPERATURE: 98.6 F | HEART RATE: 93 BPM | DIASTOLIC BLOOD PRESSURE: 69 MMHG | WEIGHT: 279.1 LBS | SYSTOLIC BLOOD PRESSURE: 103 MMHG | RESPIRATION RATE: 21 BRPM | BODY MASS INDEX: 47.65 KG/M2 | HEIGHT: 64 IN | OXYGEN SATURATION: 94 %

## 2017-11-24 DIAGNOSIS — M54.9 OTHER CHRONIC BACK PAIN: Primary | ICD-10-CM

## 2017-11-24 DIAGNOSIS — E66.01 MORBID OBESITY WITH BMI OF 45.0-49.9, ADULT (HCC): ICD-10-CM

## 2017-11-24 DIAGNOSIS — M05.79 SEROPOSITIVE RHEUMATOID ARTHRITIS OF MULTIPLE SITES (HCC): ICD-10-CM

## 2017-11-24 DIAGNOSIS — G89.29 OTHER CHRONIC BACK PAIN: Primary | ICD-10-CM

## 2017-11-24 DIAGNOSIS — E78.00 HYPERCHOLESTEROLEMIA: ICD-10-CM

## 2017-11-24 RX ORDER — DICLOFENAC SODIUM 10 MG/G
2 GEL TOPICAL 4 TIMES DAILY
Qty: 1 EACH | Refills: 5 | Status: SHIPPED | OUTPATIENT
Start: 2017-11-24 | End: 2018-02-02 | Stop reason: ALTCHOICE

## 2017-11-24 NOTE — PROGRESS NOTES
Weight Loss Progress Note: follow up Physician Visit      Kaci Reeves is a 59 y.o. female with BMI ,47.91who is here for her follow up  Evaluation for the medical bariatric care. Her sugars have been good. The highest was 169  She did eat a large plate yesterday. Her thanksgiving dinner was from [employer]\  Her weight continues to increase despite our work on weight loss. She still takes prednisone which complicates the matter  CC: I want to be healthier    Weight History  Current weight 279 and BMI is Body mass index is 47.91 kg/(m^2). Goal weight 200  Highest weight 272  (See weight gain time line scanned into media section of chart)        Significant Medical History    Update on sleep apnea and  CPAP does not use the machine    Ever had bariatric surgery: no    Pregnant or planning on becoming pregnant w/in 6 months: no       Significant Psychosocial History   Any history of drug abuse or dependence: no  Current Major Lifestyle Changes: no  Any potential unsupportive people: no          Social History  Social History   Substance Use Topics    Smoking status: Former Smoker     Packs/day: 0.50     Years: 20.00     Quit date: 1/1/2013    Smokeless tobacco: Never Used    Alcohol use No     How many times a week do you eat out? 2    Do you drink any EtOH?  no   If so, how much? Do you have upcoming any travel in the next 6 weeks?  no   If so, what do you have planned?           Exercise  How many days a week do you currently exercise?  0 days  Have you ever been told by a physician not to exercise?  no      Objective  Visit Vitals    /69    Pulse 93    Temp 98.6 °F (37 °C) (Oral)    Resp 21    Ht 5' 4\" (1.626 m)    Wt 279 lb 1.6 oz (126.6 kg)    SpO2 94%    BMI 47.91 kg/m2       Bicep - (right ) circumference  Waist Circumference: See Weight Management Doc Flowsheet  Neck Circumference: See Weight Management Doc Flowsheet  Percent Body Fat: See Weight Management Doc Flowsheet  Weight Metrics 11/24/2017 11/24/2017 10/25/2017 10/24/2017 8/28/2017 8/28/2017 8/9/2017   Weight - 279 lb 1.6 oz 272 lb 4.8 oz 271 lb - 264 lb 11.2 oz -   Neck Circ (inches) - - - - 18 - 18   Waist Measure Inches 53 - - - 53 - 55.5   Exercise Mins/week - - - - - - 0   Body Fat % 49.7 - - - 48.9 - 48.9   BMI - 47.91 kg/m2 46.74 kg/m2 46.52 kg/m2 - 45.44 kg/m2 -         Labs:     Review of Systems  Complete ROS negative except where noted above      Physical Exam    Vital Signs Reviewed  Weight Management Metrics Reviewed    Vital Signs Reviewed  Appearance: Obese, A&O x 3, NAD  HEENT:  NC/AT, EOMI, PERRL, No scleral icterus, malampatti score:4  Skin:    Skin tags - no   Acanthosis Nigricans - no  Neck:  No nodes, thyromegaly   Heart:  RRR without M/R/G  Lungs:  CTAB, no rhonchi, rales, or wheezes with good air exchange   Abdomen:  Non-tender, pos bowel sounds; hepatomegaly -   Ext:  No C/C/E        Assessment & Plan  Diagnoses and all orders for this visit:    1. Other chronic back pain  Use the voltaren  2. Seropositive rheumatoid arthritis of multiple sites ([facility]  Go back to rheum  3. Morbid obesity with BMI of 45.0-49.9, adult ([facility]  Diet:get back on the low carb diet    Activity: when the sore on the right leg heals start going back to the pool    Medication:     4. Hypercholesterolemia  Cont to monitor    Based on his history and exam, Belinda Sapp is a good candidate for the  [facility]         There are no Patient Instructions on file for this visit. Follow-up Disposition:  Return in about 2 weeks (around 12/8/2017). Over 50% of the 30 minutes face to face time with Ileana consisted of counseling & coordinating and/or discussing treatment plans in reference to her obesity The primary encounter diagnosis was Other chronic back pain.  Diagnoses of Seropositive rheumatoid arthritis of multiple sites [facility]), Morbid obesity with BMI of 45.0-49.9, adult ([address] and Hypercholesterolemia were also pertinent to this visit.   The patient verbalizes understanding and agrees with the plan of care    Patient has the advanced directives  booklet to review

## 2017-11-24 NOTE — PROGRESS NOTES
1. Have you been to the ER, urgent care clinic since your last visit? Hospitalized since your last visit? No    2. Have you seen or consulted any other health care providers outside of the [address] [place] [address] since your last visit? Include any pap smears or colon screening.  No     Chief Complaint   Patient presents with    Weight Management     Body Weight: 279.1  Body Fat%: 49.7  Muscle Mass Weight: 47.9  Body Water Weight: 47.0  Basal Metabolic Rate: 4290  BMI: 47.91

## 2017-11-24 NOTE — MR AVS SNAPSHOT
Visit Information Date & Time Provider Department Dept. Phone Encounter #  
 11/24/2017 10:45 AM Johanna Stinson MD [address] [place] [address] [id number] Follow-up Instructions Return in about 2 weeks (around 12/8/2017). Your Appointments 1/24/2018  1:40 PM  
ESTABLISHED PATIENT with Amaya Roger MD  
[address] ([facility]) Appt Note: 3 mo f/u  
 [facility] [address] [facility] [address]  
[phone]  
  
   
 [facility] [address] [address] Upcoming Health Maintenance Date Due  
 EYE EXAM RETINAL OR DILATED Q1 12/29/1963 BREAST CANCER SCRN MAMMOGRAM 8/19/2016 LIPID PANEL Q1 3/6/2018 HEMOGLOBIN A1C Q6M 4/25/2018 FOOT EXAM Q1 6/12/2018 MICROALBUMIN Q1 6/12/2018 MEDICARE YEARLY EXAM 7/18/2018 FOBT Q 1 YEAR AGE 50-75 10/28/2018 PAP AKA CERVICAL CYTOLOGY 8/14/2020 DTaP/Tdap/Td series (2 - Td) 12/26/2026 Allergies as of 11/24/2017  Review Complete On: 11/24/2017 By: Johanna Stinson MD  
 No Known Allergies Current Immunizations  Reviewed on 8/18/2017 Name Date Influenza Vaccine 9/6/2016 Influenza Vaccine (Quad) 11/19/2015  2:49 PM  
 Influenza Vaccine (Quad) PF 10/25/2017 Pneumococcal Polysaccharide (PPSV-23) 7/17/2017 Not reviewed this visit You Were Diagnosed With   
  
 Codes Comments Other chronic back pain    -  Primary ICD-10-CM: M54.9, G89.29 ICD-9-CM: 724.5, 338.29 Seropositive rheumatoid arthritis of multiple sites [facility])     ICD-10-CM: M05.79 ICD-9-CM: 714.0 Morbid obesity with BMI of 45.0-49.9, adult (HCC)     ICD-10-CM: E66.01, Z68.42 
ICD-9-CM: 278.01, V85.42 Hypercholesterolemia     ICD-10-CM: E78.00 ICD-9-CM: 272.0 Vitals BP Pulse Temp Resp Height(growth percentile) Weight(growth percentile) 103/69 93 98.6 °F (37 °C) (Oral) 21 5' 4\" (1.626 m) 279 lb 1.6 oz (126.6 kg) SpO2 BMI OB Status Smoking Status 94% 47.91 kg/m2 Postmenopausal Former Smoker Vitals History BMI and BSA Data Body Mass Index Body Surface Area  
 47.91 kg/m 2 2.39 m 2 Preferred Pharmacy Pharmacy Name Phone [address] [phone] Your Updated Medication List  
  
   
This list is accurate as of: 11/24/17 11:17 AM.  Always use your most recent med list.  
  
  
  
  
 alendronate 70 mg tablet Commonly known as:  FOSAMAX Take 70 mg by mouth every seven (7) days. atorvastatin 40 mg tablet Commonly known as:  LIPITOR  
TAKE 1 TABLET BY MOUTH DAILY Blood-Glucose Meter monitoring kit Commonly known as:  BLOOD GLUCOSE MONITORING  
E11.8 To check blood sugar daily buPROPion  mg XL tablet Commonly known as:  WELLBUTRIN XL  
TAKE 1 TABLET BY MOUTH EVERY MORNING  
  
 CALCIUM 600 + D 600-125 mg-unit Tab Generic drug:  calcium-cholecalciferol (d3) Take  by mouth daily. carvedilol 25 mg tablet Commonly known as:  COREG  
TAKE 1 TABLET BY MOUTH TWICE DAILY  
  
 dapagliflozin 5 mg Tab tablet Commonly known as:  [employer] Take 1 Tab by mouth daily. diclofenac 1 % Gel Commonly known as:  VOLTAREN Apply 2 g to affected area four (4) times daily. DULoxetine 40 mg Cpdr  
TK 1 C PO QD  
  
 * folic acid 1 mg tablet Commonly known as:  [employer] Take  by mouth daily. * folic acid 1 mg tablet Commonly known as:  FOLVITE  
TAKE 1 TABLET BY MOUTH DAILY  
  
 gabapentin 300 mg capsule Commonly known as:  NEURONTIN  
TAKE 1 CAPSULE BY MOUTH THREE TIMES DAILY  
  
 glucose blood VI test strips strip Commonly known as:  blood glucose test  
E11.8 Check blood sugar daily Lancets Misc E11.8 Check blood sugar once daily  
  
 levothyroxine 100 mcg tablet Commonly known as:  SYNTHROID  
 TAKE 1 TABLET BY MOUTH DAILY  
  
 metFORMIN 500 mg tablet Commonly known as:  GLUCOPHAGE  
TAKE 1 TABLET BY MOUTH TWICE DAILY WITH MEALS  
  
 methotrexate 2.5 mg tablet Commonly known as:  Riesa Cleverly Take 20 mg by mouth Every Saturday. predniSONE 5 mg tablet Commonly known as:  Gabriella Loaiza Take 2 tablets daily for 4 weeks then 1.5 tablet for 4 weeks then 1 tablet daily PROAIR HFA 90 mcg/actuation inhaler Generic drug:  albuterol Take 2 Puffs by inhalation every four (4) hours as needed. RITUXAN IV  
by IntraVENous route. topiramate 25 mg tablet Commonly known as:  TOPAMAX Take 1 Tab by mouth daily (with dinner). valsartan 80 mg tablet Commonly known as:  DIOVAN  
TAKE 1 TABLET BY MOUTH DAILY  
  
 VITAMIN D2 50,000 unit capsule Generic drug:  ergocalciferol Take 1 Cap by mouth every seven (7) days. * Notice: This list has 2 medication(s) that are the same as other medications prescribed for you. Read the directions carefully, and ask your doctor or other care provider to review them with you. Prescriptions Sent to Pharmacy Refills  
 diclofenac (VOLTAREN) 1 % gel 5 Sig: Apply 2 g to affected area four (4) times daily. Class: Normal  
 Pharmacy: [facility] [facility] [address] [place] Ph #: [phone] Route: Topical  
  
Follow-up Instructions Return in about 2 weeks (around 12/8/2017). To-Do List   
 11/30/2017 1:00 PM  
(Arrive by 12:30 PM) Appointment with PULMONARY LAB [facility] PULMONARY LAB ([phone]) 1. Bring list of current medications or bag medicines. 2. No breathing medicines 6 hours before the procedure. 3. Patient should eat light the day of procedure.   
  
 02/16/2018 10:00 AM  
  Appointment with [address] at [place] [address] ([phone])  
  
 03/02/2018 7:30 AM  
 Appointment with [address] [place] [address] ([phone])  
  
 08/17/2018 10:00 AM  
  Appointment with [address] at [place] [address] ([phone])  
  
 08/31/2018 7:30 AM  
  Appointment with [address] [place] [address] ([phone]) [facility] & HEALTH SERVICES! Dear Kristi Valentine: Thank you for requesting a [employer] account. Our records indicate that you have previously registered for a [employer] account but its currently inactive. Please call our [employer] support line at [phone]. Additional Information If you have questions, please visit the Frequently Asked Questions section of the [employer] website at [email or web address] Remember, [employer] is NOT to be used for urgent needs. For medical emergencies, dial 911. Now available from your iPhone and Android! Please provide this summary of care documentation to your next provider. Your primary care clinician is listed as Vidhya Osman. If you have any questions after today's visit, please call [phone].

## 2017-11-30 ENCOUNTER — APPOINTMENT (OUTPATIENT)
Dept: PULMONOLOGY | Age: 62
End: 2017-11-30
Attending: INTERNAL MEDICINE

## 2017-12-02 RX ORDER — BUPROPION HYDROCHLORIDE 300 MG/1
TABLET ORAL
Qty: 30 TAB | Refills: 0 | Status: SHIPPED | OUTPATIENT
Start: 2017-12-02 | End: 2018-01-16 | Stop reason: SDUPTHER

## 2017-12-27 DIAGNOSIS — E11.9 TYPE 2 DIABETES MELLITUS WITHOUT COMPLICATION, WITHOUT LONG-TERM CURRENT USE OF INSULIN (HCC): ICD-10-CM

## 2017-12-27 RX ORDER — METFORMIN HYDROCHLORIDE 500 MG/1
TABLET ORAL
Qty: 60 TAB | Refills: 0 | Status: SHIPPED | OUTPATIENT
Start: 2017-12-27 | End: 2018-02-02 | Stop reason: SDUPTHER

## 2018-01-08 ENCOUNTER — APPOINTMENT (OUTPATIENT)
Dept: INFUSION THERAPY | Age: 63
End: 2018-01-08
Payer: MEDICARE

## 2018-01-22 ENCOUNTER — APPOINTMENT (OUTPATIENT)
Dept: INFUSION THERAPY | Age: 63
End: 2018-01-22
Payer: MEDICARE

## 2018-01-23 RX ORDER — BUPROPION HYDROCHLORIDE 300 MG/1
TABLET ORAL
Qty: 30 TAB | Refills: 0 | Status: SHIPPED | OUTPATIENT
Start: 2018-01-23 | End: 2018-02-02 | Stop reason: SDUPTHER

## 2018-01-24 ENCOUNTER — OFFICE VISIT (OUTPATIENT)
Dept: RHEUMATOLOGY | Age: 63
End: 2018-01-24

## 2018-01-24 VITALS
DIASTOLIC BLOOD PRESSURE: 69 MMHG | RESPIRATION RATE: 20 BRPM | HEART RATE: 79 BPM | BODY MASS INDEX: 43.54 KG/M2 | HEIGHT: 64 IN | TEMPERATURE: 98 F | SYSTOLIC BLOOD PRESSURE: 111 MMHG | WEIGHT: 255 LBS

## 2018-01-24 DIAGNOSIS — Z79.60 LONG-TERM USE OF IMMUNOSUPPRESSANT MEDICATION: ICD-10-CM

## 2018-01-24 DIAGNOSIS — M79.7 FIBROMYALGIA: ICD-10-CM

## 2018-01-24 DIAGNOSIS — M17.0 PRIMARY OSTEOARTHRITIS OF BOTH KNEES: ICD-10-CM

## 2018-01-24 DIAGNOSIS — E55.9 VITAMIN D DEFICIENCY: ICD-10-CM

## 2018-01-24 DIAGNOSIS — M05.79 SEROPOSITIVE RHEUMATOID ARTHRITIS OF MULTIPLE SITES (HCC): Primary | ICD-10-CM

## 2018-01-24 DIAGNOSIS — R93.89 ABNORMAL FINDINGS ON DIAGNOSTIC IMAGING OF OTHER SPECIFIED BODY STRUCTURES: ICD-10-CM

## 2018-01-24 DIAGNOSIS — M81.0 AGE-RELATED OSTEOPOROSIS WITHOUT CURRENT PATHOLOGICAL FRACTURE: ICD-10-CM

## 2018-01-24 NOTE — MR AVS SNAPSHOT
[address] 
[phone] Patient: Charlotte Conklin MRN: [id number] :1955 Visit Information Date & Time Provider Department Dept. Phone Encounter #  
 2018  1:40 PM Edgard Cutler MD [address] [facility] [id number] Follow-up Instructions Return in about 3 months (around 2018). Upcoming Health Maintenance Date Due  
 EYE EXAM RETINAL OR DILATED Q1 1963 BREAST CANCER SCRN MAMMOGRAM 2016 LIPID PANEL Q1 3/6/2018 HEMOGLOBIN A1C Q6M 2018 FOOT EXAM Q1 2018 MICROALBUMIN Q1 2018 MEDICARE YEARLY EXAM 2018 FOBT Q 1 YEAR AGE 50-75 10/28/2018 PAP AKA CERVICAL CYTOLOGY 2020 DTaP/Tdap/Td series (2 - Td) 2026 Allergies as of 2018  Review Complete On: 2018 By: Edgard Cutler MD  
 No Known Allergies Current Immunizations  Reviewed on 2017 Name Date Influenza Vaccine 2016 Influenza Vaccine (Quad) 2015  2:49 PM  
 Influenza Vaccine (Quad) PF 10/25/2017 Pneumococcal Polysaccharide (PPSV-23) 2017 Not reviewed this visit You Were Diagnosed With   
  
 Codes Comments Seropositive rheumatoid arthritis of multiple sites [facility])    -  Primary ICD-10-CM: M05.79 ICD-9-CM: 714.0 Age-related osteoporosis without current pathological fracture     ICD-10-CM: M81.0 ICD-9-CM: 733.01 Long-term use of immunosuppressant medication     ICD-10-CM: Z79.899 ICD-9-CM: V58.69 Vitamin D deficiency     ICD-10-CM: E55.9 ICD-9-CM: 268.9 Fibromyalgia     ICD-10-CM: M79.7 ICD-9-CM: 729.1 Primary osteoarthritis of both knees     ICD-10-CM: M17.0 ICD-9-CM: 715.16 Abnormal findings on diagnostic imaging of other specified body structures     ICD-10-CM: R93.8 ICD-9-CM: 793.99 Vitals BP Pulse Temp Resp Height(growth percentile) Weight(growth percentile) 111/69 (BP 1 Location: Left arm, BP Patient Position: Sitting) 79 98 °F (36.7 °C) 20 5' 4\" (1.626 m) 255 lb (115.7 kg) BMI OB Status Smoking Status 43.77 kg/m2 Postmenopausal Former Smoker BMI and BSA Data Body Mass Index Body Surface Area 43.77 kg/m 2 2.29 m 2 Preferred Pharmacy Pharmacy Name Phone [address] [phone] Your Updated Medication List  
  
   
This list is accurate as of: 1/24/18  2:02 PM.  Always use your most recent med list.  
  
  
  
  
 atorvastatin 40 mg tablet Commonly known as:  LIPITOR  
TAKE 1 TABLET BY MOUTH DAILY Blood-Glucose Meter monitoring kit Commonly known as:  BLOOD GLUCOSE MONITORING  
E11.8 To check blood sugar daily buPROPion  mg XL tablet Commonly known as:  WELLBUTRIN XL  
TAKE 1 TABLET BY MOUTH EVERY MORNING  
  
 CALCIUM 600 + D 600-125 mg-unit Tab Generic drug:  calcium-cholecalciferol (d3) Take  by mouth daily. carvedilol 25 mg tablet Commonly known as:  COREG  
TAKE 1 TABLET BY MOUTH TWICE DAILY  
  
 diclofenac 1 % Gel Commonly known as:  VOLTAREN Apply 2 g to affected area four (4) times daily. folic acid 1 mg tablet Commonly known as:  FOLVITE  
TAKE 1 TABLET BY MOUTH DAILY  
  
 gabapentin 300 mg capsule Commonly known as:  NEURONTIN  
TAKE 1 CAPSULE BY MOUTH THREE TIMES DAILY  
  
 glucose blood VI test strips strip Commonly known as:  blood glucose test  
E11.8 Check blood sugar daily Lancets Misc E11.8 Check blood sugar once daily  
  
 levothyroxine 100 mcg tablet Commonly known as:  SYNTHROID  
TAKE 1 TABLET BY MOUTH DAILY  
  
 metFORMIN 500 mg tablet Commonly known as:  GLUCOPHAGE  
TAKE 1 TABLET BY MOUTH TWICE DAILY WITH MEALS  
  
 methotrexate 2.5 mg tablet Commonly known as:  Moncho Blanche Take 20 mg by mouth Every Saturday. PROAIR HFA 90 mcg/actuation inhaler Generic drug:  albuterol Take 2 Puffs by inhalation every four (4) hours as needed. RITUXAN IV  
by IntraVENous route. valsartan 80 mg tablet Commonly known as:  DIOVAN  
TAKE 1 TABLET BY MOUTH DAILY  
  
 VITAMIN D2 50,000 unit capsule Generic drug:  ergocalciferol Take 1 Cap by mouth every seven (7) days. We Performed the Following C REACTIVE PROTEIN, QT [36784 CPT(R)] CBC WITH AUTOMATED DIFF [34547 CPT(R)] CHRONIC HEPATITIS PANEL [[id number] Custom] METABOLIC PANEL, COMPREHENSIVE [16761 CPT(R)] PROTEIN ELECTROPHORESIS W/ REFLX ZULY [[id number] Custom] PTH INTACT [03512 CPT(R)] QUANTIFERON TB GOLD [[id number] Custom] REFERRAL TO PHYSICAL THERAPY [HKG92 Custom] Comments:  
 Evaluate and treat for knee osteoarthritis and lower extremity weakness SED RATE (ESR) E9712347 CPT(R)] TSH REFLEX TO T4 [42640 CPT(R)] VITAMIN D, 25 HYDROXY E1007006 CPT(R)] Follow-up Instructions Return in about 3 months (around 4/24/2018). To-Do List   
 01/24/2018 Imaging:  DEXA BONE DENSITY STUDY AXIAL   
  
 02/16/2018 10:00 AM  
  Appointment with [address] at [place] [address] ([phone])  
  
 03/02/2018 7:30 AM  
  Appointment with [address] at [place] [address] ([phone])  
  
 08/17/2018 10:00 AM  
  Appointment with [address] [place] [address] ([phone])  
  
 08/31/2018 7:30 AM  
  Appointment with [address] at [place] [address] ([phone]) Referral Information Referral ID Referred By Referred To  
  
 [id number] Maria C Zimmerman Not Available Visits Status Start Date End Date 1 New Request 1/24/18 1/24/19  If your referral has a status of pending review or denied, additional information will be sent to support the outcome of this decision. Introducing [facility] & HEALTH SERVICES! Dear Mimi Slaughter: Thank you for requesting a [email or web address] account. Our records indicate that you have previously registered for a [email or web address] account but its currently inactive. Please call our [email or web address] support line at [phone]. Additional Information If you have questions, please visit the Frequently Asked Questions section of the [email or web address] website at [email or web address] Remember, [email or web address] is NOT to be used for urgent needs. For medical emergencies, dial 911. Now available from your iPhone and Android! Please provide this summary of care documentation to your next provider. Your primary care clinician is listed as Isaac Kurtz. If you have any questions after today's visit, please call [phone].

## 2018-01-24 NOTE — PROGRESS NOTES
REASON FOR VISIT    This is a follow-up visit for Ms. Johnson for Seropositive Rheumatoid Arthritis. Inflammatory arthritis phenotype includes:  Anti-CCP positive: yes (73)  Rheumatoid factor positive: yes (86.2)  Erosive disease: no  Extra-articular manifestations include: none    Immunosuppression Screening (10/13/2016):   Quantiferon TB: negative  PPD:  Not performed  Hepatitis B: negative  Hepatitis C: negative    Therapy History includes:    Current DMARD therapy includes: methotrexate 20 mg every Saturday, Rituximab (8/07/2017-8/18/2017)  Prior DMARD therapy includes: methotrexate, Arava, Enbrel, Humira, Orencia, Actemra infusion (12/16/2016-7/2017)   The following DMARDs have been ineffective: methotrexate (6 months), Onencia SQ (4 months), Actemra   The following DMARDs were stopped because of side effects: Arava (hepatotoxicity), Humira (elbow infection from MRSA s/p 3 surgeries)     Osteoporosis Historical Synopsis     Height loss since age 25 (at least two inches): 2.5  Fracture history includes: no  Family history of hip fracture: no  Fall Risk: yes     Daily calcium intake is 1,000 mg  Daily vitamin D intake is 50,000 per week     Smoking history: yes (former smoker of 22 years)  Alcohol consumption: no  Prednisone history: yes (25 mg for more than several years)     Exercise: yes     Previous work-up for osteoporosis includes the following:  DEXA Scan: 2015  Vitamin 25OH D level: 27.9 (3/06/2017)  TSH: 0.1.920 (3/06/2017)  PTH: 38 (10/13/2016)     Therapy History includes:     Current osteoporosis therapy includes: none  Prior osteoporosis therapy includes: alendronate 70 mg weekly (1 year)  The following osteoporosis have been ineffective: none  The following osteoporosis were stopped because of side effects: none    Immunizations:   Immunization History   Administered Date(s) Administered    Influenza Vaccine 09/06/2016    Influenza Vaccine (Quad) 11/19/2015    Influenza Vaccine (Quad) PF 10/25/2017    Pneumococcal Polysaccharide (PPSV-23) 07/17/2017       Active problems include:    Patient Active Problem List   Diagnosis Code    Arthritis of knee M17.10    Lumbar stenosis with neurogenic claudication M48.062    Seropositive rheumatoid arthritis of multiple sites ([facility] M05.79    Easy bruising R23.8    Acquired hypothyroidism E03.9    Postmenopausal depression F32.89    Hypercholesterolemia E78.00    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 R61    Long term (current) use of systemic steroids Z79.52    Long term current use of immunosuppressive drug Z79.899    Essential hypertension I10    Controlled type 2 diabetes mellitus with complication, without long-term current use of insulin ([facility]) E11.8    Morbid obesity with BMI of 45.0-49.9, adult ([facility]) E66.01, Z68.42    Vitamin D deficiency E55.9    Osteopenia M85.80    Severe episode of recurrent major depressive disorder, without psychotic features ([facility] F33.2    Long-term use of immunosuppressant medication Z79.899       HISTORY OF PRESENT ILLNESS    Ms. Jonathan Morgan returns for a follow-up visit. On her last visit, I continued her methotrexate to 20 mg weekly Saturday and Rituximab and instructed her to reduce her dose to 10 mg for 4 weeks, then 7.5 mg for 4 weeks, and then 5 mg daily. I ordered labs on her last visit, but she did not get them done because she forgot. I also asked her to schedule her DXA but she did not due to depression. She received the rituximab infusion bill for 2600$, and cannot afford it so refuses to continue it. She has not applied for the [employer] care card. Today, she has severe pain \"everywhere\", in her shoulders, arms, fingers, neck, and knee pain. She denies swelling. Her legs are unsteady and she is walking with a cane. She had worsening knee pain 3 weeks after stopping prednisone.      Last toxicity monitoring by blood work was done on 6/21/2017 and did not reveal any significant adverse effects, except ALT 54 (previously 120). Most recent inflammatory markers from 6/05/2017 revealed a ESR 1 mm/hr (previously 27 mm/hr) and CRP 0.29 mg/L (previously 3.8 mg/L). The patient has not had any interval hospital admissions, infections, or surgeries. REVIEW OF SYSTEMS    A comprehensive review of systems was performed and pertinent results are documented in the HPI, review of systems is otherwise non-contributory. PAST MEDICAL HISTORY    She has a past medical history of Anxiety; Arrhythmia; Arthritis; Arthritis of knee (1/30/2012); Autoimmune disease ([address] Bronchitis; Chronic pain; Depression; Dyslipidemia; Morbid obesity ([facility] Nausea & vomiting; Other screening mammogram (8/19/15); Overweight(278.02); Papanicolaou smear for cervical cancer screening (8/14/15); Rheumatoid aortitis; and SVT (supraventricular tachycardia) ([facility] (1993). FAMILY HISTORY    Her family history includes Breast Cancer in an other family member; Diabetes in her brother and mother; Heart Disease in her brother and father. SOCIAL HISTORY    She reports that she quit smoking about 5 years ago. She has a 10.00 pack-year smoking history. She has never used smokeless tobacco. She reports that she does not drink alcohol or use illicit drugs. MEDICATIONS    Current Outpatient Prescriptions   Medication Sig Dispense Refill    buPROPion XL (WELLBUTRIN XL) 300 mg XL tablet TAKE 1 TABLET BY MOUTH EVERY MORNING 30 Tab 0    metFORMIN (GLUCOPHAGE) 500 mg tablet TAKE 1 TABLET BY MOUTH TWICE DAILY WITH MEALS 60 Tab 0    diclofenac (VOLTAREN) 1 % gel Apply 2 g to affected area four (4) times daily.  1 Each 5    folic acid (FOLVITE) 1 mg tablet TAKE 1 TABLET BY MOUTH DAILY 90 Tab 0    glucose blood VI test strips (BLOOD GLUCOSE TEST) strip E11.8 Check blood sugar daily 100 Strip 3    Lancets misc E11.8 Check blood sugar once daily 100 Each 3    methotrexate (RHEUMATREX) 2.5 mg tablet Take 20 mg by mouth Every Saturday.  Blood-Glucose Meter (BLOOD GLUCOSE MONITORING) monitoring kit E11.8  To check blood sugar daily 1 Kit 0    carvedilol (COREG) 25 mg tablet TAKE 1 TABLET BY MOUTH TWICE DAILY 180 Tab 0    VITAMIN D2 50,000 unit capsule Take 1 Cap by mouth every seven (7) days. 12 Cap 3    PROAIR HFA 90 mcg/actuation inhaler Take 2 Puffs by inhalation every four (4) hours as needed. 1 Inhaler 2    valsartan (DIOVAN) 80 mg tablet TAKE 1 TABLET BY MOUTH DAILY 90 Tab 3    atorvastatin (LIPITOR) 40 mg tablet TAKE 1 TABLET BY MOUTH DAILY 90 Tab 3    levothyroxine (SYNTHROID) 100 mcg tablet TAKE 1 TABLET BY MOUTH DAILY 90 Tab 3    gabapentin (NEURONTIN) 300 mg capsule TAKE 1 CAPSULE BY MOUTH THREE TIMES DAILY 270 Cap 1    calcium-cholecalciferol, d3, (CALCIUM 600 + D) 600-125 mg-unit tab Take  by mouth daily.  RITUXIMAB (RITUXAN IV) by IntraVENous route. ALLERGIES    No Known Allergies    PHYSICAL EXAMINATION    Visit Vitals    /69 (BP 1 Location: Left arm, BP Patient Position: Sitting)    Pulse 79    Temp 98 °F (36.7 °C)    Resp 20    Ht 5' 4\" (1.626 m)    Wt 255 lb (115.7 kg)    BMI 43.77 kg/m2     Body mass index is 43.77 kg/(m^2). General: Patient is alert, oriented x 3, not in acute distress, minimally diaphoretic. HEENT:   Sclerae are not injected and appear moist.  Oral mucous membranes are moist, there are no ulcers present. There is no alopecia. Neck is supple. Cardiovascular:  Heart is regular rate and rhythm, no murmurs. Chest:  Lungs are clear to auscultation bilaterally. No rhonchi, wheezes, or crackles. Abdomen:  Obese. Extremities:  Free of clubbing, cyanosis, edema    Neurological exam:  No focal sensory deficits, muscle strength is full in upper and lower extremities     Skin exam:  There are no alopecia, no discoid lesions, no active Raynaud's, no livedo reticularis, no periungual erythema.     Multiple small skin tears and bruising    Musculoskeletal exam:  A comprehensive musculoskeletal exam was performed for all joints of each upper and lower extremity and assessed for swelling, tenderness and range of motion.  Positive results are documented as below:    Bilateral CMC crepitus  Left Elbow flexion contracture  Decreased of ROM of wrists    Joint Count 1/24/2018 10/24/2017 6/21/2017 3/6/2017 1/17/2017 11/3/2016 10/13/2016   Patient pain (0-100) 95 - 85 70 35 80 85   MHAQ 1.75 - 1.75 1.5 1.5 1.38 1.63   Left shoulder - Tender 1 - 1 - - - -   Left elbow - Tender 1 - 1 - - - 1   Left elbow - Swollen 1 - 1 - - - 1   Left wrist- Tender 1 1 - - - - 1   Left wrist- Swollen 1 1 1 - - - 1   Left 1st MCP - Tender - 1 1 - - - 1   Left 1st MCP - Swollen - 1 1 - - - 1   Left 2nd MCP - Tender - 1 1 - - - 1   Left 2nd MCP - Swollen - 1 1 - - - 1   Left 3rd MCP - Tender - 1 1 - - - 1   Left 3rd MCP - Swollen - 1 1 - - - 1   Left 4th MCP - Tender - - 1 - - - 1   Left 4th MCP - Swollen - - 1 - - - 1   Left 5th MCP - Tender - - 1 - - - 1   Left 5th MCP - Swollen - - - - - - 1   Left thumb IP - Tender - - - - - - 1   Left thumb IP - Swollen - - - - - - 1   Left 2nd PIP - Tender - - 1 - - - 1   Left 2nd PIP - Swollen - - 1 - - - 1   Left 3rd PIP - Tender 1 1 1 - - - 1   Left 3rd PIP - Swollen 1 1 1 - - - 1   Left 4th PIP - Tender - 1 1 - - - 1   Left 4th PIP - Swollen - - 1 - - - -   Left 5th PIP - Tender - - - - - - 1   Right shoulder - Tender 1 - 1 - - - -   Right elbow - Tender - - 1 - - - 1   Right elbow - Swollen 1 - - - - - 1   Right wrist- Tender 1 1 1 - - - 1   Right wrist- Swollen 1 1 1 - - - 1   Right 1st MCP - Tender 1 1 - - - - 1   Right 1st MCP - Swollen - 1 - - - - 1   Right 2nd MCP - Tender 1 1 1 - - - 1   Right 2nd MCP - Swollen - 1 - - - - 1   Right 3rd MCP - Tender 1 1 1 - - - 1   Right 3rd MCP - Swollen 1 1 1 - - - 1   Right 4th MCP - Tender 1 - 1 - - - 1   Right 4th MCP - Swollen - - - - - - 1   Right 5th MCP - Tender 1 - 1 - - - 1   Right 5th MCP - Swollen - - - - - - 1   Right thumb IP - Tender 1 1 - - - - 1   Right thumb IP - Swollen - - - - - - 1   Right 2nd PIP - Tender 1 1 1 - - - 1   Right 2nd PIP - Swollen 1 1 1 - - - 1   Right 3rd PIP - Tender 1 1 1 - - - 1   Right 3rd PIP - Swollen 1 1 1 - - - 1   Right 4th PIP - Tender 1 - 1 - - - 1   Right 4th PIP - Swollen - - 1 - - - 1   Right 5th PIP - Tender 1 - - - - - 1   Right 5th PIP - Swollen - - - - - - 1   Tender Joint Count (Total) 16 13 20 - - - -   Swollen Joint Count (Total) 8 11 14 - - - -   Physician Assessment (0-10) 4 - 6 - - - 9   Patient Assessment (0-10) 9.5 - 9 7.5 6 7 9   CDAI Total (calculated) 37.5 - 52 - - - -       DATA REVIEW    Laboratory     The following laboratory results were reviewed and discussed with the patient:    Orders Only on 10/28/2017   Component Date Value    Occult blood fecal, by IA 10/28/2017 Negative     Please note 10/28/2017 Comment    Office Visit on 10/25/2017   Component Date Value    Hemoglobin A1c 10/25/2017 8.1*    Estimated average glucose 10/25/2017 186        Imaging    Musculoskeletal Ultrasound    None    Radiographs    Chest 6/21/2017: Lungs: The lungs are clear of mass, nodule, airspace disease or edema. Pleura: There is no pleural effusion or pneumothorax. Mediastinum: The cardiac and mediastinal contours and pulmonary vascularity are normal.  The aorta is atherosclerotic. Bones and soft tissues: There are mild degenerative changes of the spine. Left Elbow 10/13/2016: mild to moderate diffuse joint space loss with moderate-sized marginal osteophytes. An elbow effusion is evident. Bones are moderately osteopenic. No acute fracture or dislocation is shown. Bilateral Hand 10/13/2016: moderate diffuse osteopenia. Bilateral carpal soft tissue swelling is shown which is greater on the right. Severe joint space loss is demonstrated in the radiocarpal joints bilaterally with scapholunate advanced collapse of the right. Right capitate abuts the distal radius. Incongruence on the left is shown between the scaphoid and lunate with mild interspace widening indicating early scapholunate advanced collapse. Small right bilateral distal radioulnar joint osteophytes are shown. A 2 to 3 mm loose body is shown on the right. There is moderate bilateral first CMC osteoarthrosis with lateral subluxation and tiny marginal osteophytes. Mild to moderate uniform joint space narrowing is shown throughout the metacarpophalangeal joints with only minimal bilateral second and left third MCP joint osteophyte formation. Mild MCP level bilateral soft tissue swelling is present. Digital assessment shows mild joint space narrowing of the right second and third and the left third DIP joints. Tiny osteophytes are shown at these articulations as well as a small periosteal calcification along the medial aspect of the right ring finger middle phalangeal head. No definite erosions are identified nor is there soft tissue calcifications indicative of calcium pyrophosphate dihydrate deposition. Bilateral Foot 10/13/2016: chronic appearing fracture at the base of the right fifth metatarsal bone with fracture gap widening measuring up to 6 mm and mild adjacent soft tissue swelling. There is no other evidence for acute or chronic fracture or dislocation. Mild bilateral first metatarsophalangeal joint osteoarthrosis is demonstrated with tiny marginal osteophytes. Mild osseous hypertrophy of the medial first metatarsal heads is shown bilaterally with nonmarginal erosions with sclerotic margins and mild overlying soft tissue swelling. There is also mild valgus at posterior first MTP joints would lateral subluxation of the hallux sesamoid bones. Soft tissue swelling also overlies the fifth metatarsal heads bilaterally. On the right, there are nonmarginal erosions measuring up to 6 mm in size, also sharply demarcated with sclerotic margins.  Mild joint space loss of the left fifth metatarsophalangeal joint is demonstrated with tiny marginal osteophytes. Several discrete nonmarginal erosions are shown medially and laterally in the third metatarsal head with mild MTP joint space loss on the right. First through third tarsometatarsal joint space narrowing is present bilaterally with subcortical demineralization at the right third metatarsal base. Mild overlying dorsal soft tissue swelling is shown. The patient is status post resection of the left second metatarsal head. Extensive atherosclerotic calcifications are shown. Pelvic 1/07/2016: bilateral hip prostheses are partially visualized and in satisfactory alignment. There is no acute fracture. Lumbosacral fusion hardware is intact. 1/07/2016:    Lumbar 7/11/2014: recent laminectomy and instrumentation from , with placement of rods and pedicle screws. The hardware appears intact and hardware position is appropriate. Degenerative disc disease is present from L2-S1. There is grade 1 spondylolisthesis at L4-5 which is probably chronic. There is no evidence of vertebral compression fracture. CT Imaging    CTA chest with and without contrast 7/10/2014: no mediastinal, axillary or hilar lymphadenopathy. There is no pleural or pericardial effusion. The aorta is normal in course and caliber. The proximal pulmonary arteries are without evidence of filling defects, the caliber of the pulmonary arteries is also normal. The pulmonary parenchyma is unremarkable. No lytic or blastic lesions are identified. Subcutaneous edema in the soft tissues. Hepatic steatosis. No aortic aneurysm. No pulmonary embolism. The remainder of the upper abdomen visualized is unremarkable    MR Imaging    None    DXA     DXA 6/25/2015: (Excluded sites: prosthetic hips, L3 and L4 for hardware and both hips for hardware) lumbar spine L1-L2 T score -0.2 (BMD 1.150 g/cm2). PROCEDURE     Kenalog 80 mg IM. (06/21/17)     ASSESSMENT AND PLAN    This is a follow-up visit for Ms. Johnson.     1) Seropositive Rheumatoid Arthritis. She is maintained on methotrexate 20 mg every Saturday and rituximab (started in 8/2017). She is now off prednisone. Her CDAI was 37.5 (previously N/A, 49) with 16 tender and 8 swollen joints. This likely is worse due to coming off prednisone. I asked her to continue rituximab    2) Long Term Use of Systemic Steroids. She is no longer on prednisone. 3) Fibromyalgia. This was an active issue today, which is influencing her pain. I recommended Mart Chi and aqua therapy. 4) Long Term Use of Immunosuppressants. The patient remains on immunomodulatory medications (methotrexate, rituximab) and requires frequent toxicity monitoring by blood work. Respective labs were ordered (CBC and CMP).    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 She is no longer on alendronate 70 mg weekly due unable to keeping up with taking her pills. She is amenable to infusions, so I will start Reclast. She takes 1000 mg of calcium daily and ergocalciferol. I asked her to schedule her DXA. She did not do it before due to depression. I asked her to schedule it again.    6) Morbid Obesity. Her BMI was 43.77 (previously 46.52, 46.69, 46.17,  45.32, 43.69, 44.46).    7) Hypertension. Her blood pressure was 111/69 (previously 144/92, 149/89, 151/104, 160/112). She is on Coreg 12.5 mg twice daily. She is on 25 mg twice daily, valsartan 80 mg daily.    8) Vitamin D Deficiency. Her vitamin D was 27.9 (24.1). She is on ergocalciferol 50,000 weekly. I refilled it.      9) Hypercholesterolemia. Her cholesterol was 170 (previously 215). She is on Lipitor 40 mg.       10) Constant Diaphoresis. This has improved after discontinuing prednisone. 11) Major Depression. This is an active issue. She follow with a therapist and an NP psychiatrist.      12) Diabetes Mellitus Type II. Her HbA1c was 7.3% (previously 6.6%)    13) Left posterior tibilais tendon tenderness. I recommend PT.    14) Dyspnea.  I ordered pulmonary function tests, but she did not do them    15) Financial Concerns. I asked her to apply to the [facility]. Number given to her.     16) Bilateral Knee Pain. She reports this getting worse 3 weeks after stopping prednisone. I am concerned for AVN. I ordered radiographs. The patient voiced understanding of the aforementioned assessment and plan. Summary of plan was provided in the After Visit Summary patient instructions.      TODAY'S ORDERS    Orders Placed This Encounter    QUANTIFERON TB GOLD    DEXA BONE DENSITY STUDY AXIAL    CBC WITH AUTOMATED DIFF    CHRONIC HEPATITIS PANEL    METABOLIC PANEL, COMPREHENSIVE    C REACTIVE PROTEIN, QT    SED RATE (ESR)    TSH REFLEX TO T4    VITAMIN D, 25 HYDROXY    PTH INTACT    PROTEIN ELECTROPHORESIS W/ REFLX ZULY    REFERRAL TO PHYSICAL THERAPY     Future Appointments  Date Time Provider Kenn Eller   2/16/2018 10:00 AM [place] INFUSION NURSE 3 [facility]   3/2/2018 7:30 AM [place] INFUSION NURSE 4 [facility]   8/17/2018 10:00 AM [place] INFUSION NURSE 3 [facility]   8/31/2018 7:30 AM [place] INFUSION NURSE 3 [facility] Dejon Telles MD, [address] [facility]    Adult Rheumatology   Musculoskeletal Ultrasound Certified  [facility]   [facility], [address] [place] [address]   Phone [phone]  Fax [phone]

## 2018-01-26 ENCOUNTER — TELEPHONE (OUTPATIENT)
Dept: RHEUMATOLOGY | Age: 63
End: 2018-01-26

## 2018-01-26 LAB
25(OH)D3+25(OH)D2 SERPL-MCNC: 23.4 NG/ML (ref 30–100)
ALBUMIN SERPL ELPH-MCNC: 3.5 G/DL (ref 2.9–4.4)
ALBUMIN SERPL-MCNC: 4.1 G/DL (ref 3.6–4.8)
ALBUMIN/GLOB SERPL: 1.1 {RATIO} (ref 0.7–1.7)
ALBUMIN/GLOB SERPL: 1.6 {RATIO} (ref 1.2–2.2)
ALP SERPL-CCNC: 96 IU/L (ref 39–117)
ALPHA1 GLOB SERPL ELPH-MCNC: 0.2 G/DL (ref 0–0.4)
ALPHA2 GLOB SERPL ELPH-MCNC: 0.7 G/DL (ref 0.4–1)
ALT SERPL-CCNC: 39 IU/L (ref 0–32)
AST SERPL-CCNC: 46 IU/L (ref 0–40)
B-GLOBULIN SERPL ELPH-MCNC: 1.4 G/DL (ref 0.7–1.3)
BASOPHILS # BLD AUTO: 0.1 X10E3/UL (ref 0–0.2)
BASOPHILS NFR BLD AUTO: 1 %
BILIRUB SERPL-MCNC: 0.4 MG/DL (ref 0–1.2)
BUN SERPL-MCNC: 7 MG/DL (ref 8–27)
BUN/CREAT SERPL: 9 (ref 12–28)
CALCIUM SERPL-MCNC: 9.6 MG/DL (ref 8.7–10.3)
CHLORIDE SERPL-SCNC: 101 MMOL/L (ref 96–106)
CO2 SERPL-SCNC: 21 MMOL/L (ref 18–29)
COMMENT, 144067: NORMAL
CREAT SERPL-MCNC: 0.76 MG/DL (ref 0.57–1)
CRP SERPL-MCNC: 6.5 MG/L (ref 0–4.9)
EOSINOPHIL # BLD AUTO: 1.4 X10E3/UL (ref 0–0.4)
EOSINOPHIL NFR BLD AUTO: 18 %
ERYTHROCYTE [DISTWIDTH] IN BLOOD BY AUTOMATED COUNT: 14.2 % (ref 12.3–15.4)
ERYTHROCYTE [SEDIMENTATION RATE] IN BLOOD BY WESTERGREN METHOD: 12 MM/HR (ref 0–40)
GAMMA GLOB SERPL ELPH-MCNC: 0.7 G/DL (ref 0.4–1.8)
GFR SERPLBLD CREATININE-BSD FMLA CKD-EPI: 83 ML/MIN/1.73
GFR SERPLBLD CREATININE-BSD FMLA CKD-EPI: 96 ML/MIN/1.73
GLOBULIN SER CALC-MCNC: 2.5 G/DL (ref 1.5–4.5)
GLOBULIN SER CALC-MCNC: 3.1 G/DL (ref 2.2–3.9)
GLUCOSE SERPL-MCNC: 161 MG/DL (ref 65–99)
HBV CORE AB SERPL QL IA: NEGATIVE
HBV CORE IGM SERPL QL IA: NEGATIVE
HBV E AB SERPL QL IA: NEGATIVE
HBV E AG SERPL QL IA: NEGATIVE
HBV SURFACE AB SER QL: NON REACTIVE
HBV SURFACE AG SERPL QL IA: NEGATIVE
HCT VFR BLD AUTO: 44.4 % (ref 34–46.6)
HCV AB S/CO SERPL IA: <0.1 S/CO RATIO (ref 0–0.9)
HGB BLD-MCNC: 14.9 G/DL (ref 11.1–15.9)
IMM GRANULOCYTES # BLD: 0 X10E3/UL (ref 0–0.1)
IMM GRANULOCYTES NFR BLD: 1 %
LYMPHOCYTES # BLD AUTO: 1.8 X10E3/UL (ref 0.7–3.1)
LYMPHOCYTES NFR BLD AUTO: 23 %
M PROTEIN SERPL ELPH-MCNC: ABNORMAL G/DL
MCH RBC QN AUTO: 32.7 PG (ref 26.6–33)
MCHC RBC AUTO-ENTMCNC: 33.6 G/DL (ref 31.5–35.7)
MCV RBC AUTO: 97 FL (ref 79–97)
MONOCYTES # BLD AUTO: 0.8 X10E3/UL (ref 0.1–0.9)
MONOCYTES NFR BLD AUTO: 11 %
NEUTROPHILS # BLD AUTO: 3.8 X10E3/UL (ref 1.4–7)
NEUTROPHILS NFR BLD AUTO: 46 %
PLATELET # BLD AUTO: 311 X10E3/UL (ref 150–379)
PLEASE NOTE, 011150: ABNORMAL
POTASSIUM SERPL-SCNC: 4.6 MMOL/L (ref 3.5–5.2)
PROT PATTERN SERPL ELPH-IMP: ABNORMAL
PROT SERPL-MCNC: 6.6 G/DL (ref 6–8.5)
PTH-INTACT SERPL-MCNC: 16 PG/ML (ref 15–65)
RBC # BLD AUTO: 4.56 X10E6/UL (ref 3.77–5.28)
SODIUM SERPL-SCNC: 142 MMOL/L (ref 134–144)
TSH SERPL DL<=0.005 MIU/L-ACNC: 4.15 UIU/ML (ref 0.45–4.5)
WBC # BLD AUTO: 7.9 X10E3/UL (ref 3.4–10.8)

## 2018-01-26 NOTE — TELEPHONE ENCOUNTER
Patient informed of x-ray results per letter from Dr. Larissa Peters. Patient informed letter was mailed to her also.

## 2018-01-26 NOTE — TELEPHONE ENCOUNTER
----- Message from Naila Wilcox sent at 1/26/2018  2:58 PM EST -----  Regarding: FW: Dr. Mitchell Garrett / telephone      ----- Message -----     From: Justice Ball     Sent: 1/26/2018   2:49 PM       To: [facility]  Subject: Dr. Mitchell Garrett / telephone                         Patient is requesting callback. Wants to know results of x-rays.  Best contact [phone]

## 2018-01-27 LAB
ANNOTATION COMMENT IMP: NORMAL
GAMMA INTERFERON BACKGROUND BLD IA-ACNC: 0.02 IU/ML
M TB IFN-G BLD-IMP: NEGATIVE
M TB IFN-G CD4+ BCKGRND COR BLD-ACNC: 0.01 IU/ML
M TB IFN-G CD4+ T-CELLS BLD-ACNC: 0.03 IU/ML
MITOGEN IGNF BLD-ACNC: 7.92 IU/ML
QUANTIFERON INCUBATION: NORMAL
SERVICE CMNT-IMP: NORMAL

## 2018-01-29 NOTE — PROGRESS NOTES
The results were reviewed and a letter was sent. Slightly elevated liver function tests (chronic); will continue to monitor. Elevated inflammatory marker (CRP). Your vitamin D is low. Continue weekly vitamin D ergocalciferol 50,000. All remaining labs are normal/negative.

## 2018-02-02 ENCOUNTER — TELEPHONE (OUTPATIENT)
Dept: RHEUMATOLOGY | Age: 63
End: 2018-02-02

## 2018-02-02 ENCOUNTER — OFFICE VISIT (OUTPATIENT)
Dept: FAMILY MEDICINE CLINIC | Age: 63
End: 2018-02-02

## 2018-02-02 VITALS
HEIGHT: 64 IN | OXYGEN SATURATION: 94 % | RESPIRATION RATE: 18 BRPM | WEIGHT: 256.1 LBS | BODY MASS INDEX: 43.72 KG/M2 | HEART RATE: 89 BPM | SYSTOLIC BLOOD PRESSURE: 98 MMHG | TEMPERATURE: 99 F | DIASTOLIC BLOOD PRESSURE: 61 MMHG

## 2018-02-02 DIAGNOSIS — M05.79 SEROPOSITIVE RHEUMATOID ARTHRITIS OF MULTIPLE SITES (HCC): ICD-10-CM

## 2018-02-02 DIAGNOSIS — F32.89 OTHER DEPRESSION: ICD-10-CM

## 2018-02-02 DIAGNOSIS — E11.9 TYPE 2 DIABETES MELLITUS WITHOUT COMPLICATION, WITHOUT LONG-TERM CURRENT USE OF INSULIN (HCC): ICD-10-CM

## 2018-02-02 DIAGNOSIS — E03.9 ACQUIRED HYPOTHYROIDISM: Primary | ICD-10-CM

## 2018-02-02 DIAGNOSIS — E66.01 OBESITY, MORBID, BMI 40.0-49.9 (HCC): ICD-10-CM

## 2018-02-02 DIAGNOSIS — E78.00 HYPERCHOLESTEROLEMIA: ICD-10-CM

## 2018-02-02 DIAGNOSIS — E55.9 VITAMIN D DEFICIENCY: ICD-10-CM

## 2018-02-02 RX ORDER — LEVOTHYROXINE SODIUM 100 UG/1
TABLET ORAL
Qty: 30 TAB | Refills: 5 | Status: SHIPPED | OUTPATIENT
Start: 2018-02-02 | End: 2018-07-16 | Stop reason: SDUPTHER

## 2018-02-02 RX ORDER — ATORVASTATIN CALCIUM 40 MG/1
TABLET, FILM COATED ORAL
Qty: 30 TAB | Refills: 5 | Status: SHIPPED | OUTPATIENT
Start: 2018-02-02 | End: 2018-07-16 | Stop reason: SDUPTHER

## 2018-02-02 RX ORDER — METFORMIN HYDROCHLORIDE 500 MG/1
TABLET ORAL
Qty: 60 TAB | Refills: 5 | Status: SHIPPED | OUTPATIENT
Start: 2018-02-02 | End: 2018-07-16 | Stop reason: SDUPTHER

## 2018-02-02 RX ORDER — DICLOFENAC SODIUM 75 MG/1
75 TABLET, DELAYED RELEASE ORAL 2 TIMES DAILY
Qty: 60 TAB | Refills: 5 | Status: SHIPPED | OUTPATIENT
Start: 2018-02-02 | End: 2018-07-16 | Stop reason: SDUPTHER

## 2018-02-02 RX ORDER — BUPROPION HYDROCHLORIDE 300 MG/1
TABLET ORAL
Qty: 30 TAB | Refills: 5 | Status: SHIPPED | OUTPATIENT
Start: 2018-02-02 | End: 2018-02-26 | Stop reason: ALTCHOICE

## 2018-02-02 RX ORDER — DULOXETINE 40 MG/1
CAPSULE, DELAYED RELEASE ORAL
Qty: 30 CAP | Refills: 5 | Status: SHIPPED | OUTPATIENT
Start: 2018-02-02 | End: 2018-07-16 | Stop reason: SDUPTHER

## 2018-02-02 RX ORDER — DULOXETINE 40 MG/1
CAPSULE, DELAYED RELEASE ORAL
Refills: 2 | COMMUNITY
Start: 2018-01-11 | End: 2018-02-02 | Stop reason: SDUPTHER

## 2018-02-02 NOTE — PROGRESS NOTES
Chief Complaint   Patient presents with    Weight Management     she is a 57y.o. year old female who presents for evalution. Recently seen in rheum. prednisne was stopped. She was referred to [facility] therapy by Dr Hoa Gaffney  She did  Not agree to the appt yet because she did not want to go up to [place] due to the distance    She is asking for diclofenac for pain  Reviewed PmHx, RxHx, FmHx, SocHx, AllgHx and updated and dated in the chart.     Aspirin yes ____   No____ N/A____    Patient Active Problem List    Diagnosis    Long-term use of immunosuppressant medication    Morbid obesity with BMI of 45.0-49.9, adult ([facility]    Vitamin D deficiency    Osteopenia    Severe episode of recurrent major depressive disorder, without psychotic features ([address]    Controlled type 2 diabetes mellitus with complication, without long-term current use of insulin ([address]    Long term (current) use of systemic steroids    Long term current use of immunosuppressive drug    Essential hypertension    Hypercholesterolemia    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    Seropositive rheumatoid arthritis of multiple sites ([address]    Easy bruising    Acquired hypothyroidism    Postmenopausal depression    Lumbar stenosis with neurogenic claudication    Arthritis of knee       Nurse notes were reviewed and copied and are correct  Review of Systems - negative except as listed above in the HPI    Objective:     Vitals:    02/02/18 0822   BP: 98/61   Pulse: 89   Resp: 18   Temp: 99 °F (37.2 °C)   TempSrc: Oral   SpO2: 94%   Weight: 256 lb 1.6 oz (116.2 kg)   Height: 5' 4\" (1.626 m)        Physical Examination: General appearance - alert, well appearing, and in no distress  Mental status - alert, oriented to person, place, and time  Chest - clear to auscultation, no wheezes, rales or rhonchi, symmetric air entry  Heart - normal rate, regular rhythm, normal S1, S2, no murmurs, rubs, clicks or gallops  Musculoskeletal - healing bruise on both      Assessment/ Plan:   Diagnoses and all orders for this visit:    1. Acquired hypothyroidism  -     levothyroxine (SYNTHROID) 100 mcg tablet; TAKE 1 TABLET BY MOUTH DAILY    2. Type 2 diabetes mellitus without complication, without long-term current use of insulin ([facility])  -     metFORMIN (GLUCOPHAGE) 500 mg tablet; TAKE 1 TABLET BY MOUTH TWICE DAILY WITH MEALS    3. Hypercholesterolemia  -     atorvastatin (LIPITOR) 40 mg tablet; TAKE 1 TABLET BY MOUTH DAILY    4. Vitamin D deficiency    5. Seropositive rheumatoid arthritis of multiple sites (HCC)  -     diclofenac EC (VOLTAREN) 75 mg EC tablet; Take 1 Tab by mouth two (2) times a day. 6. Obesity, morbid, BMI 40.0-49.9 ([facility]  Come back in 2 weeeks to address the obesity more  7. Other depression  -     DULoxetine 40 mg cpDR; TK ONE C PO  D  -     buPROPion XL (WELLBUTRIN XL) 300 mg XL tablet; TAKE 1 TABLET BY MOUTH EVERY MORNING       Follow-up Disposition:  Return in about 2 weeks (around 2/16/2018). ICD-10-CM ICD-9-CM    1. Acquired hypothyroidism E03.9 244.9 levothyroxine (SYNTHROID) 100 mcg tablet   2. Type 2 diabetes mellitus without complication, without long-term current use of insulin ([facility]) E11.9 250.00 metFORMIN (GLUCOPHAGE) 500 mg tablet   3. Hypercholesterolemia E78.00 272.0 atorvastatin (LIPITOR) 40 mg tablet   4. Vitamin D deficiency E55.9 268.9    5. Seropositive rheumatoid arthritis of multiple sites ([facility]) M05.79 714.0 diclofenac EC (VOLTAREN) 75 mg EC tablet   6. Obesity, morbid, BMI 40.0-49.9 ([facility]) E66.01 278.01    7. Other depression F32.89 311 DULoxetine 40 mg cpDR      buPROPion XL (WELLBUTRIN XL) 300 mg XL tablet       I have discussed the diagnosis with the patient and the intended plan as seen in the above orders. The patient has received an after-visit summary and questions were answered concerning future plans.      Medication Side Effects and Warnings were discussed with patient: yes  Patient Labs were reviewed and or requested: yes  Patient Past Records were reviewed and or requested: yes        There are no Patient Instructions on file for this visit.     The patient verbalizes understanding and agrees with the plan of care        Patient has the advanced directives booklet to review

## 2018-02-02 NOTE — MR AVS SNAPSHOT
[address] [address] 
[phone] Patient: Katerina Herrera MRN: [id number] :[id number] Visit Information Date & Time Provider Department Dept. Phone Encounter #  
 2018  8:15 AM Wendy Vallejo MD [address] [place] [address] [id number] Follow-up Instructions Return in about 2 weeks (around 2018). Your Appointments 2018  2:20 PM  
ESTABLISHED PATIENT with Joann Neri MD  
[address] ([address]y) Appt Note: 3 month f/up  
 Carlitos Contreras [facility] [address]  
[phone]  
  
   
 [facility] [id number] Upcoming Health Maintenance Date Due  
 EYE EXAM RETINAL OR DILATED Q1 1963 BREAST CANCER SCRN MAMMOGRAM 2016 LIPID PANEL Q1 3/6/2018 HEMOGLOBIN A1C Q6M 2018 FOOT EXAM Q1 2018 MICROALBUMIN Q1 2018 MEDICARE YEARLY EXAM 2018 FOBT Q 1 YEAR AGE 50-75 10/28/2018 PAP AKA CERVICAL CYTOLOGY 2020 DTaP/Tdap/Td series (2 - Td) 2026 Allergies as of 2018  Review Complete On: 2018 By: Ge Mendoza LPN No Known Allergies Current Immunizations  Reviewed on 2017 Name Date Influenza Vaccine 2016 Influenza Vaccine (Quad) 2015  2:49 PM  
 Influenza Vaccine (Quad) PF 10/25/2017 Pneumococcal Polysaccharide (PPSV-23) 2017 Not reviewed this visit You Were Diagnosed With   
  
 Codes Comments Acquired hypothyroidism    -  Primary ICD-10-CM: E03.9 ICD-9-CM: 710. 9 Type 2 diabetes mellitus without complication, without long-term current use of insulin (HCC)     ICD-10-CM: E11.9 ICD-9-CM: 250.00 Hypercholesterolemia     ICD-10-CM: E78.00 ICD-9-CM: 272.0 Vitamin D deficiency     ICD-10-CM: E55.9 ICD-9-CM: 268.9 Seropositive rheumatoid arthritis of multiple sites [facility])     ICD-10-CM: M05.79 ICD-9-CM: 714.0 Obesity, morbid, BMI 40.0-49.9 (HCC)     ICD-10-CM: E66.01 
ICD-9-CM: 278.01 Other depression     ICD-10-CM: F32.89 ICD-9-CM: 413 Vitals BP Pulse Temp Resp Height(growth percentile) Weight(growth percentile) 98/61 89 99 °F (37.2 °C) (Oral) 18 5' 4\" (1.626 m) 256 lb 1.6 oz (116.2 kg) SpO2 BMI OB Status Smoking Status 94% 43.96 kg/m2 Postmenopausal Former Smoker Vitals History BMI and BSA Data Body Mass Index Body Surface Area 43.96 kg/m 2 2.29 m 2 Preferred Pharmacy Pharmacy Name Phone [address] [phone] Your Updated Medication List  
  
   
This list is accurate as of: 2/2/18  8:57 AM.  Always use your most recent med list.  
  
  
  
  
 atorvastatin 40 mg tablet Commonly known as:  LIPITOR  
TAKE 1 TABLET BY MOUTH DAILY Blood-Glucose Meter monitoring kit Commonly known as:  BLOOD GLUCOSE MONITORING  
E11.8 To check blood sugar daily buPROPion  mg XL tablet Commonly known as:  WELLBUTRIN XL  
TAKE 1 TABLET BY MOUTH EVERY MORNING  
  
 CALCIUM 600 + D 600-125 mg-unit Tab Generic drug:  calcium-cholecalciferol (d3) Take  by mouth daily. carvedilol 25 mg tablet Commonly known as:  COREG  
TAKE 1 TABLET BY MOUTH TWICE DAILY  
  
 diclofenac EC 75 mg EC tablet Commonly known as:  VOLTAREN Take 1 Tab by mouth two (2) times a day. DULoxetine 40 mg Cpdr  
TK ONE C PO  D  
  
 folic acid 1 mg tablet Commonly known as:  FOLVITE  
TAKE 1 TABLET BY MOUTH DAILY  
  
 gabapentin 300 mg capsule Commonly known as:  NEURONTIN  
TAKE 1 CAPSULE BY MOUTH THREE TIMES DAILY  
  
 glucose blood VI test strips strip Commonly known as:  blood glucose test  
E11.8 Check blood sugar daily Lancets Misc E11.8 Check blood sugar once daily  
  
 levothyroxine 100 mcg tablet Commonly known as:  SYNTHROID  
TAKE 1 TABLET BY MOUTH DAILY  
  
 metFORMIN 500 mg tablet Commonly known as:  GLUCOPHAGE  
TAKE 1 TABLET BY MOUTH TWICE DAILY WITH MEALS  
  
 methotrexate 2.5 mg tablet Commonly known as:  Justen Later Take 20 mg by mouth Every Saturday. PROAIR HFA 90 mcg/actuation inhaler Generic drug:  albuterol Take 2 Puffs by inhalation every four (4) hours as needed. RITUXAN IV  
by IntraVENous route. valsartan 80 mg tablet Commonly known as:  DIOVAN  
TAKE 1 TABLET BY MOUTH DAILY  
  
 VITAMIN D2 50,000 unit capsule Generic drug:  ergocalciferol Take 1 Cap by mouth every seven (7) days. Prescriptions Sent to Pharmacy Refills  
 metFORMIN (GLUCOPHAGE) 500 mg tablet 5 Sig: TAKE 1 TABLET BY MOUTH TWICE DAILY WITH MEALS Class: Normal  
 [facility] Ph #: [phone] DULoxetine 40 mg cpDR 5 Sig: TK ONE C PO  D Class: Normal  
 Pharmacy: [facility] [address] [place] [address] Ph #: [phone]  
 buPROPion XL (WELLBUTRIN XL) 300 mg XL tablet 5 Sig: TAKE 1 TABLET BY MOUTH EVERY MORNING Class: Normal  
 Pharmacy: [facility] [address] [place] [address] Ph #: [phone]  
 atorvastatin (LIPITOR) 40 mg tablet 5 Sig: TAKE 1 TABLET BY MOUTH DAILY Class: Normal  
 Pharmacy: [facility] [address] [place] [address] Ph #: [phone]  
 levothyroxine (SYNTHROID) 100 mcg tablet 5 Sig: TAKE 1 TABLET BY MOUTH DAILY  Class: Normal  
 Pharmacy: [facility] [address] [place] [facility] Ph #: [phone]  
 diclofenac EC (VOLTAREN) 75 mg EC tablet 5 Sig: Take 1 Tab by mouth two (2) times a day. Class: Normal  
 Pharmacy: [facility] [address] [facility] [place] Ph #: [phone] Route: Oral  
  
Follow-up Instructions Return in about 2 weeks (around 2/16/2018). To-Do List   
 02/16/2018 10:00 AM  
  Appointment with [address] at [place] [address] ([phone])  
  
 03/02/2018 7:30 AM  
  Appointment with [address] at [place] [address] ([phone])  
  
 08/17/2018 10:00 AM  
  Appointment with [address] at [place] [address] ([phone])  
  
 08/31/2018 7:30 AM  
  Appointment with [address] at [place] [address] ([phone]) [facility] SERVICES! Dear Patito Rey: Thank you for requesting a [employer] account. Our records indicate that you have previously registered for a [employer] account but its currently inactive. Please call our [employer] support line at [phone]. Additional Information If you have questions, please visit the Frequently Asked Questions section of the [employer] website at [email or web address] Remember, [employer] is NOT to be used for urgent needs. For medical emergencies, dial 911. Now available from your iPhone and Android! Please provide this summary of care documentation to your next provider. Your primary care clinician is listed as Manoj Vanegas. If you have any questions after today's visit, please call [phone].

## 2018-02-05 ENCOUNTER — TELEPHONE (OUTPATIENT)
Dept: RHEUMATOLOGY | Age: 63
End: 2018-02-05

## 2018-02-05 DIAGNOSIS — M17.0 PRIMARY OSTEOARTHRITIS OF BOTH KNEES: ICD-10-CM

## 2018-02-05 DIAGNOSIS — M05.79 SEROPOSITIVE RHEUMATOID ARTHRITIS OF MULTIPLE SITES (HCC): Primary | ICD-10-CM

## 2018-02-05 NOTE — TELEPHONE ENCOUNTER
Spoke with pt who called asking if she could do aquatherapy instead of physical therapy? I told her that was fine and that I would place the referral in the system and that she would need to call the place that she wants to go and to take the referral (that I am mailing her) with her to her appt. She stated an understanding.

## 2018-02-13 ENCOUNTER — TELEPHONE (OUTPATIENT)
Dept: FAMILY MEDICINE CLINIC | Age: 63
End: 2018-02-13

## 2018-02-13 NOTE — TELEPHONE ENCOUNTER
Per [address] office, they're a retinal specialist office. If patient just needs to be seen for eye infection she needs to go to an ophthalmologist. Did advise that patient was reassigned to this provider by [facility] due to the one we referred her to originally is out of network.

## 2018-02-26 RX ORDER — BUPROPION HYDROCHLORIDE 300 MG/1
TABLET ORAL
Qty: 30 TAB | Refills: 0 | Status: SHIPPED | OUTPATIENT
Start: 2018-02-26 | End: 2018-07-16 | Stop reason: SDUPTHER

## 2018-03-02 ENCOUNTER — TELEPHONE (OUTPATIENT)
Dept: RHEUMATOLOGY | Age: 63
End: 2018-03-02

## 2018-03-02 NOTE — TELEPHONE ENCOUNTER
Called pt regarding her \"no show\" to her infusion appt. Not able to leave a message since her mailbox was not set up.

## 2018-03-05 ENCOUNTER — TELEPHONE (OUTPATIENT)
Dept: FAMILY MEDICINE CLINIC | Age: 63
End: 2018-03-05

## 2018-03-05 NOTE — TELEPHONE ENCOUNTER
Patient requesting ophthalmology referral for the following:    [facility] [place]    She's being seen for dryness/irritation in both eyes  appt date:  Wednesday March 7, 2018    Patient has [employer]

## 2018-03-06 DIAGNOSIS — H57.89 IRRITATION OF BOTH EYES: ICD-10-CM

## 2018-03-06 DIAGNOSIS — H04.129 EYE DRYNESS: Primary | ICD-10-CM

## 2018-03-15 ENCOUNTER — TELEPHONE (OUTPATIENT)
Dept: RHEUMATOLOGY | Age: 63
End: 2018-03-15

## 2018-03-15 NOTE — TELEPHONE ENCOUNTER
Spoke with pt and provided her some phone numbers from the brochure for her Actemra infusions. She stated an understanding and if she has any other questions she will call us back.

## 2018-03-15 NOTE — TELEPHONE ENCOUNTER
Patient called because she needs the phone number for the discount on the infusion medicine, she lost it. Patient can be reached at [phone].

## 2018-03-15 NOTE — TELEPHONE ENCOUNTER
Spoke with pt and let her know that I will send in the [facility] form for her Rituxan to see if she can get assistance. She stated an understanding.

## 2018-03-15 NOTE — TELEPHONE ENCOUNTER
Patient would like to speak to Dr. Xander Thorne nurse about her Actemra. Her phone is [phone].

## 2018-04-15 RX ORDER — GABAPENTIN 300 MG/1
CAPSULE ORAL
Qty: 270 CAP | Refills: 1 | Status: SHIPPED | OUTPATIENT
Start: 2018-04-15 | End: 2018-07-16 | Stop reason: SDUPTHER

## 2018-04-24 DIAGNOSIS — M05.79 SEROPOSITIVE RHEUMATOID ARTHRITIS OF MULTIPLE SITES (HCC): ICD-10-CM

## 2018-04-24 RX ORDER — FOLIC ACID 1 MG/1
TABLET ORAL
Qty: 90 TAB | Refills: 0 | Status: SHIPPED | OUTPATIENT
Start: 2018-04-24 | End: 2019-08-28 | Stop reason: SDUPTHER

## 2018-07-13 ENCOUNTER — TELEPHONE (OUTPATIENT)
Dept: FAMILY MEDICINE CLINIC | Age: 63
End: 2018-07-13

## 2018-07-13 NOTE — TELEPHONE ENCOUNTER
Patient is calling in to see if  can refer her to someone for Pain Management or Rheumatology as she states she just hurts all over and can hardly walk    Please advise thanks

## 2018-07-16 DIAGNOSIS — M05.79 SEROPOSITIVE RHEUMATOID ARTHRITIS OF MULTIPLE SITES (HCC): ICD-10-CM

## 2018-07-16 DIAGNOSIS — E11.9 TYPE 2 DIABETES MELLITUS WITHOUT COMPLICATION, WITHOUT LONG-TERM CURRENT USE OF INSULIN (HCC): ICD-10-CM

## 2018-07-16 DIAGNOSIS — F32.89 OTHER DEPRESSION: ICD-10-CM

## 2018-07-16 DIAGNOSIS — E78.00 HYPERCHOLESTEROLEMIA: ICD-10-CM

## 2018-07-16 DIAGNOSIS — E03.9 ACQUIRED HYPOTHYROIDISM: ICD-10-CM

## 2018-07-19 RX ORDER — TOPIRAMATE 25 MG/1
25 TABLET ORAL
Qty: 90 TAB | Refills: 1 | OUTPATIENT
Start: 2018-07-19

## 2018-07-19 RX ORDER — LEVOTHYROXINE SODIUM 100 UG/1
TABLET ORAL
Qty: 90 TAB | Refills: 1 | Status: SHIPPED | OUTPATIENT
Start: 2018-07-19 | End: 2019-01-30 | Stop reason: SDUPTHER

## 2018-07-19 RX ORDER — DULOXETINE 40 MG/1
1 CAPSULE, DELAYED RELEASE ORAL DAILY
Qty: 90 CAP | Refills: 1 | Status: SHIPPED | OUTPATIENT
Start: 2018-07-19 | End: 2019-01-06 | Stop reason: SDUPTHER

## 2018-07-19 RX ORDER — DICLOFENAC SODIUM 75 MG/1
75 TABLET, DELAYED RELEASE ORAL 2 TIMES DAILY
Qty: 180 TAB | Refills: 1 | Status: SHIPPED | OUTPATIENT
Start: 2018-07-19 | End: 2019-08-28

## 2018-07-19 RX ORDER — ATORVASTATIN CALCIUM 40 MG/1
TABLET, FILM COATED ORAL
Qty: 90 TAB | Refills: 1 | Status: SHIPPED | OUTPATIENT
Start: 2018-07-19 | End: 2019-06-04 | Stop reason: SDUPTHER

## 2018-07-19 RX ORDER — METFORMIN HYDROCHLORIDE 500 MG/1
TABLET ORAL
Qty: 180 TAB | Refills: 1 | Status: SHIPPED | OUTPATIENT
Start: 2018-07-19 | End: 2019-02-01 | Stop reason: SDUPTHER

## 2018-07-19 RX ORDER — GABAPENTIN 300 MG/1
CAPSULE ORAL
Qty: 270 CAP | Refills: 1 | Status: SHIPPED | OUTPATIENT
Start: 2018-07-19 | End: 2018-08-14 | Stop reason: DRUGHIGH

## 2018-07-19 RX ORDER — BUPROPION HYDROCHLORIDE 300 MG/1
TABLET ORAL
Qty: 90 TAB | Refills: 1 | Status: SHIPPED | OUTPATIENT
Start: 2018-07-19 | End: 2019-03-23 | Stop reason: SDUPTHER

## 2018-07-20 NOTE — TELEPHONE ENCOUNTER
Spoke with pt advised that Rx's were refilled all but Topamax. Advised Appt is required for that medication to be approved and she is due for labs due to abnormal results. Pt stated she will contact our office back to scheduled OV.

## 2018-08-02 ENCOUNTER — TELEPHONE (OUTPATIENT)
Dept: FAMILY MEDICINE CLINIC | Age: 63
End: 2018-08-02

## 2018-08-02 DIAGNOSIS — M54.9 CHRONIC BACK PAIN, UNSPECIFIED BACK LOCATION, UNSPECIFIED BACK PAIN LATERALITY: Primary | ICD-10-CM

## 2018-08-02 DIAGNOSIS — G89.29 CHRONIC BACK PAIN, UNSPECIFIED BACK LOCATION, UNSPECIFIED BACK PAIN LATERALITY: Primary | ICD-10-CM

## 2018-08-02 NOTE — TELEPHONE ENCOUNTER
PT is going to Dr. Pablo Mac for Pain Management. [phone].  She need a referral and authorization for 8/10 at 11:45AM at [address] [place] [address]

## 2018-08-14 ENCOUNTER — OFFICE VISIT (OUTPATIENT)
Dept: FAMILY MEDICINE CLINIC | Age: 63
End: 2018-08-14

## 2018-08-14 VITALS
TEMPERATURE: 98.3 F | HEART RATE: 112 BPM | SYSTOLIC BLOOD PRESSURE: 130 MMHG | BODY MASS INDEX: 37.9 KG/M2 | WEIGHT: 222 LBS | OXYGEN SATURATION: 95 % | RESPIRATION RATE: 19 BRPM | DIASTOLIC BLOOD PRESSURE: 81 MMHG | HEIGHT: 64 IN

## 2018-08-14 DIAGNOSIS — E78.00 HYPERCHOLESTEROLEMIA: ICD-10-CM

## 2018-08-14 DIAGNOSIS — I10 ESSENTIAL HYPERTENSION: ICD-10-CM

## 2018-08-14 DIAGNOSIS — E11.8 CONTROLLED TYPE 2 DIABETES MELLITUS WITH COMPLICATION, WITHOUT LONG-TERM CURRENT USE OF INSULIN (HCC): ICD-10-CM

## 2018-08-14 DIAGNOSIS — E13.49 OTHER DIABETIC NEUROLOGICAL COMPLICATION ASSOCIATED WITH OTHER SPECIFIED DIABETES MELLITUS (HCC): Primary | ICD-10-CM

## 2018-08-14 DIAGNOSIS — E55.9 VITAMIN D DEFICIENCY: ICD-10-CM

## 2018-08-14 RX ORDER — GABAPENTIN 600 MG/1
600 TABLET ORAL 3 TIMES DAILY
Qty: 90 TAB | Refills: 2 | Status: SHIPPED | OUTPATIENT
Start: 2018-08-14 | End: 2018-09-15 | Stop reason: ALTCHOICE

## 2018-08-14 NOTE — PROGRESS NOTES
1. Have you been to the ER, urgent care clinic since your last visit? Hospitalized since your last visit? No    2. Have you seen or consulted any other health care providers outside of the [address] [place] [address] since your last visit? Include any pap smears or colon screening.  No     Chief Complaint   Patient presents with    Generalized Body Aches     neuropathy pain

## 2018-08-14 NOTE — PROGRESS NOTES
Chief Complaint   Patient presents with    Generalized Body Aches     neuropathy pain     she is a 57y.o. year old female who presents for evalution. She has started trying to eat better. She went through a period of eating a lot of sweets and then nerve pain started in the hands and feet  She is eating better but not   Checking the blood sugar  She sometimes is in so much pain she does  Not take her meds        Reviewed PmHx, RxHx, FmHx, SocHx, AllgHx and updated and dated in the chart.     Aspirin yes ____   No____ N/A____    Patient Active Problem List    Diagnosis    Long-term use of immunosuppressant medication    Morbid obesity with BMI of 45.0-49.9, adult ([facility]    Vitamin D deficiency    Osteopenia    Severe episode of recurrent major depressive disorder, without psychotic features ([address]    Controlled type 2 diabetes mellitus with complication, without long-term current use of insulin ([facility]    Long term (current) use of systemic steroids    Long term current use of immunosuppressive drug    Essential hypertension    Hypercholesterolemia    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    Seropositive rheumatoid arthritis of multiple sites ([address]    Easy bruising    Acquired hypothyroidism    Postmenopausal depression    Lumbar stenosis with neurogenic claudication    Arthritis of knee       Nurse notes were reviewed and copied and are correct  Review of Systems - negative except as listed above in the HPI    Objective:     Vitals:    08/14/18 1148   BP: 130/81   Pulse: (!) 112   Resp: 19   Temp: 98.3 °F (36.8 °C)   TempSrc: Oral   SpO2: 95%   Weight: 222 lb (100.7 kg)   Height: 5' 4\" (1.626 m)       Physical Examination: General appearance - alert, well appearing, and in no distress and oriented to person, place, and time  Mental status - alert, oriented to person, place, and time  Neck - supple, no significant adenopathy  Lymphatics - no palpable lymphadenopathy, no hepatosplenomegaly  Chest - clear to auscultation, no wheezes, rales or rhonchi, symmetric air entry  Heart - normal rate, regular rhythm, normal S1, S2, no murmurs, rubs, clicks or gallops  Extremities - peripheral pulses normal, no pedal edema, no clubbing or cyanosis  Skin - normal coloration and turgor, no rashes, no suspicious skin lesions noted      Assessment/ Plan:   Diagnoses and all orders for this visit:    1. 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 She has been taking 600 mg tid. She has taken higher doses in the past  She has some topical lidocaine but not using it  I told her at this point stay with 600 mg tid and use the topicals for extra pain relief  Recheck in three months    She wants referral to pain med  Tried to see one last week but [facility] would not approve the referral    2. Vitamin D deficiency  -     VITAMIN D, 25 HYDROXY  rechck level today  3. Hypercholesterolemia  -     LIPID PANEL  Checking current status  4. Essential hypertension  -     METABOLIC PANEL, COMPREHENSIVE  -     LIPID PANEL    5. Controlled type 2 diabetes mellitus with complication, without long-term current use of insulin (HCC)  -     HEMOGLOBIN A1C WITH EAG  -     METABOLIC PANEL, COMPREHENSIVE       Follow-up Disposition: Not on File    ICD-10-CM ICD-9-CM    1. 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 Vitamin D deficiency E55.9 268.9 VITAMIN D, 25 HYDROXY   3. Hypercholesterolemia E78.00 272.0 LIPID PANEL   4. Essential hypertension I28 764.2 METABOLIC PANEL, COMPREHENSIVE      LIPID PANEL   5.  Controlled type 2 diabetes mellitus with complication, without long-term current use of insulin (HCC) E11.8 250.90 HEMOGLOBIN A1C WITH EAG      METABOLIC PANEL, COMPREHENSIVE       I have discussed the diagnosis with the patient and the intended plan as seen in the above orders. The patient has received an after-visit summary and questions were answered concerning future plans. Medication Side Effects and Warnings were discussed with patient: yes  Patient Labs were reviewed and or requested: yes  Patient Past Records were reviewed and or requested: yes        There are no Patient Instructions on file for this visit.     The patient verbalizes understanding and agrees with the plan of care        Patient has the advanced directives booklet to review

## 2018-08-14 NOTE — MR AVS SNAPSHOT
[address] [facility] [address] 
[phone] Patient: Quang Betancourt MRN: [id number] :1955 Visit Information Date & Time Provider Department Dept. Phone Encounter #  
 2018 11:30 AM Lashay Gilmore MD [address] [place] [address] [id number] Your Appointments 2018  2:20 PM  
ESTABLISHED PATIENT with Kostas Rangel MD  
[facility]) Appt Note: F/up; F/up  
 [address] [place] [employer] [phone]  
[address] [place] [phone] Upcoming Health Maintenance Date Due  
 BREAST CANCER SCRN MAMMOGRAM 2016 LIPID PANEL Q1 3/6/2018 HEMOGLOBIN A1C Q6M 2018 FOOT EXAM Q1 2018 MICROALBUMIN Q1 2018 MEDICARE YEARLY EXAM 2018 Influenza Age 5 to Adult 2018 FOBT Q 1 YEAR AGE 50-75 10/28/2018 EYE EXAM RETINAL OR DILATED Q1 3/7/2019 PAP AKA CERVICAL CYTOLOGY 2020 DTaP/Tdap/Td series (2 - Td) 2026 Allergies as of 2018  Review Complete On: 2018 By: Lashay Gilmore MD  
 No Known Allergies Current Immunizations  Reviewed on 2017 Name Date Influenza Vaccine 2016 Influenza Vaccine (Quad) 2015  2:49 PM  
 Influenza Vaccine (Quad) PF 10/25/2017 Pneumococcal Polysaccharide (PPSV-23) 2017 Not reviewed this visit You Were Diagnosed With   
  
 Codes Comments Vitamin D deficiency    -  Primary ICD-10-CM: E55.9 ICD-9-CM: 268.9 Hypercholesterolemia     ICD-10-CM: E78.00 ICD-9-CM: 272.0 Essential hypertension     ICD-10-CM: I10 
ICD-9-CM: 401.9 Controlled type 2 diabetes mellitus with complication, without long-term current use of insulin (HCC)     ICD-10-CM: E11.8 ICD-9-CM: 250.90 Vitals BP Pulse Temp Resp Height(growth percentile) Weight(growth percentile) 130/81 (!) 112 98.3 °F (36.8 °C) (Oral) 19 5' 4\" (1.626 m) 222 lb (100.7 kg) SpO2 BMI OB Status Smoking Status 95% 38.11 kg/m2 Postmenopausal Former Smoker Vitals History BMI and BSA Data Body Mass Index Body Surface Area  
 38.11 kg/m 2 2.13 m 2 Preferred Pharmacy Pharmacy Name Phone [facility] [phone] Your Updated Medication List  
  
   
This list is accurate as of 8/14/18 12:16 PM.  Always use your most recent med list.  
  
  
  
  
 atorvastatin 40 mg tablet Commonly known as:  LIPITOR  
TAKE 1 TABLET BY MOUTH DAILY Blood-Glucose Meter monitoring kit Commonly known as:  BLOOD GLUCOSE MONITORING  
E11.8 To check blood sugar daily buPROPion  mg XL tablet Commonly known as:  WELLBUTRIN XL  
TAKE 1 TABLET BY MOUTH EVERY MORNING  
  
 CALCIUM 600 + D 600-125 mg-unit Tab Generic drug:  calcium-cholecalciferol (d3) Take  by mouth daily. carvedilol 25 mg tablet Commonly known as:  COREG  
TAKE 1 TABLET BY MOUTH TWICE DAILY  
  
 diclofenac EC 75 mg EC tablet Commonly known as:  VOLTAREN Take 1 Tab by mouth two (2) times a day. DULoxetine 40 mg Cpdr  
Take 1 Cap by mouth daily. folic acid 1 mg tablet Commonly known as:  FOLVITE  
TAKE 1 TABLET BY MOUTH DAILY  
  
 gabapentin 300 mg capsule Commonly known as:  NEURONTIN  
TAKE 1 CAPSULE BY MOUTH THREE TIMES DAILY  
  
 glucose blood VI test strips strip Commonly known as:  blood glucose test  
E11.8 Check blood sugar daily Lancets Misc E11.8 Check blood sugar once daily  
  
 levothyroxine 100 mcg tablet Commonly known as:  SYNTHROID  
TAKE 1 TABLET BY MOUTH DAILY  
  
 metFORMIN 500 mg tablet Commonly known as:  GLUCOPHAGE  
TAKE 1 TABLET BY MOUTH TWICE DAILY WITH MEALS  
  
 methotrexate 2.5 mg tablet Commonly known as:  Kayleigh Rojas Take 20 mg by mouth Every Saturday. PROAIR HFA 90 mcg/actuation inhaler Generic drug:  albuterol Take 2 Puffs by inhalation every four (4) hours as needed. RITUXAN IV  
by IntraVENous route. valsartan 80 mg tablet Commonly known as:  DIOVAN  
TAKE 1 TABLET BY MOUTH DAILY We Performed the Following HEMOGLOBIN A1C WITH EAG [89047 CPT(R)] LIPID PANEL [88206 CPT(R)] METABOLIC PANEL, COMPREHENSIVE [79206 CPT(R)] VITAMIN D, 25 HYDROXY [id number] CPT(R)] To-Do List   
 09/14/2018 7:30 AM  
  Appointment with Courtney Ziegler at [place] [address] ([phone]) Introducing [facility] & [facility]! Alem Medrano introduces [employer] patient portal. Now you can access parts of your medical record, email your doctor's office, and request medication refills online. 1. In your internet browser, go to [email or web address] 2. Click on the First Time User? Click Here link in the Sign In box. You will see the New Member Sign Up page. 3. Enter your [employer] Access Code exactly as it appears below. You will not need to use this code after youve completed the sign-up process. If you do not sign up before the expiration date, you must request a new code. · [employer] Access Code: [email or web address] Expires: 11/12/2018 12:16 PM 
 
4. Enter the last four digits of your Social Security Number (xxxx) and Date of Birth (mm/dd/yyyy) as indicated and click Submit. You will be taken to the next sign-up page. 5. Create a Invest[employer] ID. This will be your Invest[employer] login ID and cannot be changed, so think of one that is secure and easy to remember. 6. Create a [employer] password. You can change your password at any time. 7. Enter your Password Reset Question and Answer. This can be used at a later time if you forget your password. 8. Enter your e-mail address. You will receive e-mail notification when new information is available in [address] 9. Click Sign Up. You can now view and download portions of your medical record. 10. Click the Download Summary menu link to download a portable copy of your medical information. If you have questions, please visit the Frequently Asked Questions section of the [employer] website. Remember, [employer] is NOT to be used for urgent needs. For medical emergencies, dial 911. Now available from your iPhone and Android! Please provide this summary of care documentation to your next provider. Your primary care clinician is listed as Lisa Lipscomb. If you have any questions after today's visit, please call [phone].

## 2018-08-15 LAB
25(OH)D3+25(OH)D2 SERPL-MCNC: 26.9 NG/ML (ref 30–100)
ALBUMIN SERPL-MCNC: 3.6 G/DL (ref 3.6–4.8)
ALBUMIN/GLOB SERPL: 0.9 {RATIO} (ref 1.2–2.2)
ALP SERPL-CCNC: 149 IU/L (ref 39–117)
ALT SERPL-CCNC: 18 IU/L (ref 0–32)
AST SERPL-CCNC: 18 IU/L (ref 0–40)
BILIRUB SERPL-MCNC: 0.4 MG/DL (ref 0–1.2)
BUN SERPL-MCNC: 17 MG/DL (ref 8–27)
BUN/CREAT SERPL: 15 (ref 12–28)
CALCIUM SERPL-MCNC: 9.3 MG/DL (ref 8.7–10.3)
CHLORIDE SERPL-SCNC: 98 MMOL/L (ref 96–106)
CHOLEST SERPL-MCNC: 180 MG/DL (ref 100–199)
CO2 SERPL-SCNC: 22 MMOL/L (ref 20–29)
CREAT SERPL-MCNC: 1.12 MG/DL (ref 0.57–1)
EST. AVERAGE GLUCOSE BLD GHB EST-MCNC: 163 MG/DL
GLOBULIN SER CALC-MCNC: 4.1 G/DL (ref 1.5–4.5)
GLUCOSE SERPL-MCNC: 115 MG/DL (ref 65–99)
HBA1C MFR BLD: 7.3 % (ref 4.8–5.6)
HDLC SERPL-MCNC: 45 MG/DL
INTERPRETATION, 910389: NORMAL
INTERPRETATION: NORMAL
LDLC SERPL CALC-MCNC: 108 MG/DL (ref 0–99)
Lab: NORMAL
PDF IMAGE, 910387: NORMAL
POTASSIUM SERPL-SCNC: 4.6 MMOL/L (ref 3.5–5.2)
PROT SERPL-MCNC: 7.7 G/DL (ref 6–8.5)
SODIUM SERPL-SCNC: 138 MMOL/L (ref 134–144)
TRIGL SERPL-MCNC: 133 MG/DL (ref 0–149)
VLDLC SERPL CALC-MCNC: 27 MG/DL (ref 5–40)

## 2018-08-17 ENCOUNTER — APPOINTMENT (OUTPATIENT)
Dept: INFUSION THERAPY | Age: 63
End: 2018-08-17

## 2018-08-22 PROBLEM — E11.21 TYPE 2 DIABETES WITH NEPHROPATHY (HCC): Status: ACTIVE | Noted: 2018-08-22

## 2018-08-23 ENCOUNTER — TELEPHONE (OUTPATIENT)
Dept: FAMILY MEDICINE CLINIC | Age: 63
End: 2018-08-23

## 2018-08-23 NOTE — TELEPHONE ENCOUNTER
LVM advising pt Dr. Debora Adams will treat patients for condition and that she can contact our office back to schedule an appt.

## 2018-08-23 NOTE — TELEPHONE ENCOUNTER
----- Message from Junie Elliott sent at 8/17/2018  9:59 AM EDT -----  Regarding: FW: /Telephone      ----- Message -----     From: Ron Willis     Sent: 8/17/2018   8:47 AM       To: [facility] Front Office  Subject: /Telephone                               Pt would like a call back. She would like to know if the doctor treats  Fibromyalgia.     Best contact is [phone]

## 2018-08-29 ENCOUNTER — HOSPITAL ENCOUNTER (OUTPATIENT)
Dept: GENERAL RADIOLOGY | Age: 63
Discharge: HOME | End: 2018-08-29
Payer: MEDICARE

## 2018-08-29 DIAGNOSIS — G89.4 CHRONIC PAIN SYNDROME: ICD-10-CM

## 2018-08-29 PROCEDURE — 73030 X-RAY EXAM OF SHOULDER: CPT

## 2018-08-31 ENCOUNTER — APPOINTMENT (OUTPATIENT)
Dept: INFUSION THERAPY | Age: 63
End: 2018-08-31

## 2018-09-05 RX ORDER — ERGOCALCIFEROL 1.25 MG/1
50000 CAPSULE ORAL
Qty: 8 CAP | Refills: 0 | Status: SHIPPED | OUTPATIENT
Start: 2018-09-05 | End: 2019-03-20 | Stop reason: SDUPTHER

## 2018-09-06 NOTE — PROGRESS NOTES
The blood sugar is better  The vit D level is low but slightly better  The kidney test is slightly abnormal  The liver function is better  I want to recheck the kidney test in 1 month  I will send a prescription for vit D to your pharmacy.  Take one cap a week until gone then restart the lower dose vit D plus calcium tab that you have been taking

## 2018-09-07 ENCOUNTER — APPOINTMENT (OUTPATIENT)
Dept: INFUSION THERAPY | Age: 63
End: 2018-09-07

## 2018-09-12 ENCOUNTER — TELEPHONE (OUTPATIENT)
Dept: RHEUMATOLOGY | Age: 63
End: 2018-09-12

## 2018-09-12 NOTE — TELEPHONE ENCOUNTER
Olla Simmonds a pharmacist from the infusion center called because she needs a new order for Rotuxon because the previous one . A call back number is [phone], and the fax number is [phone].

## 2018-09-12 NOTE — TELEPHONE ENCOUNTER
Spoke with Eva Anderson and let her know that we are not going to send a new Rituxan order over because the pt has been \"no showing\" to her infusion appts and she has not been seen here since Jan.  She stated an understanding and will cancel her infusion appt.

## 2018-09-13 ENCOUNTER — TELEPHONE (OUTPATIENT)
Dept: RHEUMATOLOGY | Age: 63
End: 2018-09-13

## 2018-09-13 NOTE — TELEPHONE ENCOUNTER
----- Message from Sarah Jolley sent at 9/12/2018  6:12 PM EDT -----  Regarding: Dr Carlton Spencer, [facility], is requesting latest chart notes for pt    Best contact # [phone] or [phone]

## 2018-09-14 ENCOUNTER — HOSPITAL ENCOUNTER (OUTPATIENT)
Dept: INFUSION THERAPY | Age: 63
End: 2018-09-14

## 2018-09-15 RX ORDER — GABAPENTIN 300 MG/1
CAPSULE ORAL
Qty: 270 CAP | Refills: 0 | Status: SHIPPED | OUTPATIENT
Start: 2018-09-15 | End: 2019-01-31 | Stop reason: SDUPTHER

## 2018-10-02 ENCOUNTER — OFFICE VISIT (OUTPATIENT)
Dept: FAMILY MEDICINE CLINIC | Age: 63
End: 2018-10-02

## 2018-10-02 VITALS
RESPIRATION RATE: 19 BRPM | SYSTOLIC BLOOD PRESSURE: 121 MMHG | HEIGHT: 64 IN | TEMPERATURE: 98.8 F | HEART RATE: 120 BPM | BODY MASS INDEX: 35.85 KG/M2 | WEIGHT: 210 LBS | DIASTOLIC BLOOD PRESSURE: 81 MMHG | OXYGEN SATURATION: 95 %

## 2018-10-02 DIAGNOSIS — E11.8 CONTROLLED TYPE 2 DIABETES MELLITUS WITH COMPLICATION, WITHOUT LONG-TERM CURRENT USE OF INSULIN (HCC): ICD-10-CM

## 2018-10-02 DIAGNOSIS — Z23 ENCOUNTER FOR IMMUNIZATION: ICD-10-CM

## 2018-10-02 DIAGNOSIS — M25.50 MULTIPLE JOINT PAIN: Primary | ICD-10-CM

## 2018-10-02 DIAGNOSIS — I10 ESSENTIAL HYPERTENSION: ICD-10-CM

## 2018-10-02 RX ORDER — HYDROCODONE BITARTRATE AND ACETAMINOPHEN 5; 325 MG/1; MG/1
TABLET ORAL
Refills: 0 | COMMUNITY
Start: 2018-09-18 | End: 2021-07-01

## 2018-10-02 RX ORDER — ETODOLAC 300 MG/1
300 CAPSULE ORAL 2 TIMES DAILY
COMMUNITY
Start: 2018-10-01 | End: 2019-08-28

## 2018-10-02 NOTE — PROGRESS NOTES
1. Have you been to the ER, urgent care clinic since your last visit? Hospitalized since your last visit? No    2. Have you seen or consulted any other health care providers outside of the [address] [place] [address] since your last visit? Include any pap smears or colon screening.  No     Chief Complaint   Patient presents with    Fibromyalgia

## 2018-10-02 NOTE — PROGRESS NOTES
Chief Complaint   Patient presents with    Fibromyalgia     she is a 57y.o. year old female who presents for evalution. She is c/o pain in the joints in the shoulders and ankles and the bottom of her feet  She has RA and is under the care of DR Catherine Fisher  She goes to swim [facility] when she can get in the therapeutic pool  She takes an NSAID  And gabapentin and duloxetine daily  She is also taking an opiod. She says the opiod is not helping  She has deformity in the hands and feet from the RA  She stopped the infusions due to cost  She has pain with lifting the right arm, but does fully lift      Reviewed PmHx, RxHx, FmHx, SocHx, AllgHx and updated and dated in the chart.     Aspirin yes ____   No____ N/A____    Patient Active Problem List    Diagnosis    Type 2 diabetes with nephropathy ([address]    Long-term use of immunosuppressant medication    Morbid obesity with BMI of 45.0-49.9, adult ([address]    Vitamin D deficiency    Osteopenia    Severe episode of recurrent major depressive disorder, without psychotic features ([address]    Controlled type 2 diabetes mellitus with complication, without long-term current use of insulin ([address]    Long term (current) use of systemic steroids    Long term current use of immunosuppressive drug    Essential hypertension    Hypercholesterolemia    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    Seropositive rheumatoid arthritis of multiple sites ([address]    Easy bruising    Acquired hypothyroidism    Postmenopausal depression    Lumbar stenosis with neurogenic claudication    Arthritis of knee       Nurse notes were reviewed and copied and are correct  Review of Systems - negative except as listed above in the HPI    Objective:     Vitals:    10/02/18 1033   BP: 121/81   Pulse: (!) 120   Resp: 19   Temp: 98.8 °F (37.1 °C)   TempSrc: Oral   SpO2: 95%   Weight: 210 lb (95.3 kg)   Height: 5' 4\" (1.626 m)       Physical Examination: General appearance - alert, well appearing, and in no distress and   Mental status - alert, oriented to person, place, and time  Chest - clear to auscultation, no wheezes, rales or rhonchi, symmetric air entry  Heart - normal rate, regular rhythm, normal S1, S2, no murmurs, rubs, clicks or gallops  Musculoskeletal -pain wirh ROM  shoiulder, bilateral deformity of the wrists and hands as well as the feet      Assessment/ Plan:   Diagnoses and all orders for this visit:    1. Multiple joint pain  caremore is requiring the therapy to be on location  I suggestsed she keep trying for either [facility] or swim [facility]    2. Essential hypertension  Refill coreg  3. Encounter for immunization  -     Influenza virus vaccine (QUADRIVALENT PRES FREE SYRINGE) IM (87946)  -     Administration fee () for Medicare insured patients     has appt for mammo tomorrow  Getting flu shot today  We are out of shingles vaccine today  Make an appt for the medicare wellness    Follow-up Disposition:  Return for MWE.    ICD-10-CM ICD-9-CM    1. Multiple joint pain M25.50 719.49    2. Essential hypertension I10 401.9    3. Encounter for immunization Z23 V03.89 INFLUENZA VIRUS VAC QUAD,SPLIT,PRESV FREE SYRINGE IM      ADMIN INFLUENZA VIRUS VAC   4. Controlled type 2 diabetes mellitus with complication, without long-term current use of insulin (HCC) E11.8 250.90 MICROALBUMIN, UR, RAND W/ MICROALB/CREAT RATIO       I have discussed the diagnosis with the patient and the intended plan as seen in the above orders. The patient has received an after-visit summary and questions were answered concerning future plans. Medication Side Effects and Warnings were discussed with patient: yes  Patient Labs were reviewed and or requested: yes  Patient Past Records were reviewed and or requested: yes        There are no Patient Instructions on file for this visit.     The patient verbalizes understanding and agrees with the plan of care        Patient has the advanced directives booklet to review

## 2018-10-02 NOTE — MR AVS SNAPSHOT
[address] [facility] [address] 
[phone] Patient: Karena Ott MRN: [id number] :1955 Visit Information Date & Time Provider Department Dept. Phone Encounter #  
 10/2/2018 10:15 AM Angeles Neumann MD [address] [place] [address] [id number] Follow-up Instructions Return for MWE. Your Appointments 2018 11:00 AM  
ROUTINE CARE with Angeles Neumann MD  
. Muluget[address] Packer [address] [place] [address]) Appt Note: 3 month follow up [place] [id number]  
[facility] [address] Upcoming Health Maintenance Date Due Shingrix Vaccine Age 50> (1 of 2) 2003 BREAST CANCER SCRN MAMMOGRAM 2016 FOOT EXAM Q1 2018 MEDICARE YEARLY EXAM 2018 Influenza Age 5 to Adult 2018 FOBT Q 1 YEAR AGE 50-75 10/28/2018 HEMOGLOBIN A1C Q6M 2019 EYE EXAM RETINAL OR DILATED Q1 3/7/2019 LIPID PANEL Q1 2019 MICROALBUMIN Q1 10/2/2019 PAP AKA CERVICAL CYTOLOGY 2020 DTaP/Tdap/Td series (2 - Td) 2026 Allergies as of 10/2/2018  Review Complete On: 10/2/2018 By: Angeles Neumann MD  
 No Known Allergies Current Immunizations  Reviewed on 2017 Name Date Influenza Vaccine 2016 Influenza Vaccine (Quad) 2015  2:49 PM  
 Influenza Vaccine (Quad) PF  Incomplete, 10/25/2017 Pneumococcal Polysaccharide (PPSV-23) 2017 Not reviewed this visit You Were Diagnosed With   
  
 Codes Comments Multiple joint pain    -  Primary ICD-10-CM: M25.50 ICD-9-CM: 719.49 Essential hypertension     ICD-10-CM: I10 
ICD-9-CM: 401.9 Encounter for immunization     ICD-10-CM: I58 ICD-9-CM: V03.89  Controlled type 2 diabetes mellitus with complication, without long-term current use of insulin ([facility]     ICD-10-CM: E11.8 ICD-9-CM: 250.90 Vitals BP Pulse Temp Resp Height(growth percentile) Weight(growth percentile) 121/81 (!) 120 98.8 °F (37.1 °C) (Oral) 19 5' 4\" (1.626 m) 210 lb (95.3 kg) SpO2 BMI OB Status Smoking Status 95% 36.05 kg/m2 Postmenopausal Former Smoker Vitals History BMI and BSA Data Body Mass Index Body Surface Area 36.05 kg/m 2 2.07 m 2 Preferred Pharmacy Pharmacy Name Phone [address] [phone] Your Updated Medication List  
  
   
This list is accurate as of 10/2/18 11:16 AM.  Always use your most recent med list.  
  
  
  
  
 atorvastatin 40 mg tablet Commonly known as:  LIPITOR  
TAKE 1 TABLET BY MOUTH DAILY Blood-Glucose Meter monitoring kit Commonly known as:  BLOOD GLUCOSE MONITORING  
E11.8 To check blood sugar daily buPROPion  mg XL tablet Commonly known as:  WELLBUTRIN XL  
TAKE 1 TABLET BY MOUTH EVERY MORNING  
  
 CALCIUM 600 + D 600-125 mg-unit Tab Generic drug:  calcium-cholecalciferol (d3) Take  by mouth daily. carvedilol 25 mg tablet Commonly known as:  COREG  
TAKE 1 TABLET BY MOUTH TWICE DAILY  
  
 diclofenac EC 75 mg EC tablet Commonly known as:  VOLTAREN Take 1 Tab by mouth two (2) times a day. DULoxetine 40 mg Cpdr  
Take 1 Cap by mouth daily. ergocalciferol 50,000 unit capsule Commonly known as:  ERGOCALCIFEROL Take 1 Cap by mouth every seven (7) days. etodolac 300 mg capsule Commonly known as:  LODINE Take 300 mg by mouth two (2) times a day. folic acid 1 mg tablet Commonly known as:  FOLVITE  
TAKE 1 TABLET BY MOUTH DAILY  
  
 gabapentin 300 mg capsule Commonly known as:  NEURONTIN  
TAKE 1 CAPSULE BY MOUTH THREE TIMES DAILY  
  
 glucose blood VI test strips strip Commonly known as:  blood glucose test  
E11.8 Check blood sugar daily HYDROcodone-acetaminophen 5-325 mg per tablet Commonly known as:  Yo Gaspar TK 1 T PO  UP TO BID PRN Lancets Misc E11.8 Check blood sugar once daily  
  
 levothyroxine 100 mcg tablet Commonly known as:  SYNTHROID  
TAKE 1 TABLET BY MOUTH DAILY  
  
 metFORMIN 500 mg tablet Commonly known as:  GLUCOPHAGE  
TAKE 1 TABLET BY MOUTH TWICE DAILY WITH MEALS  
  
 methotrexate 2.5 mg tablet Commonly known as:  Nicki Lindau Take 20 mg by mouth Every Saturday. PROAIR HFA 90 mcg/actuation inhaler Generic drug:  albuterol Take 2 Puffs by inhalation every four (4) hours as needed. RITUXAN IV  
by IntraVENous route. valsartan 80 mg tablet Commonly known as:  DIOVAN  
TAKE 1 TABLET BY MOUTH DAILY We Performed the Following ADMIN INFLUENZA VIRUS VAC [ [facility]] INFLUENZA VIRUS VAC QUAD,SPLIT,PRESV FREE SYRINGE IM [id number] CPT(R)] MICROALBUMIN, UR, RAND W/ MICROALB/CREAT RATIO [id number] CPT(R)] Follow-up Instructions Return for E. To-Do List   
 10/03/2018 1:00 PM  
  Appointment with [facility] CHRISTIANO 1 at [facility] ([phone]) Shower or bathe using soap and water. Do not use deodorant, powder, perfumes, or lotion the day of your exam.  If your prior mammograms were not performed at [facility] 6 please bring films with you or forward prior images 2 days before your procedure. Check in at registration 15min before your appointment time unless you were instructed to do otherwise. A script is not necessary, but if you have one, please bring it on the day of the mammogram or have it faxed to the department. You are responsible for finding a method of transportation to your appointment. If you don't have transportation, please reschedule your appointment at least 24 hours in advance.   [facility] [phone] [facility]  [phone] [facility] [address] [facility] [phone] [address] [place] [address] [id number] [phone] Introducing [facility] & HEALTH SERVICES! [facility] introduces [employer] patient portal. Now you can access parts of your medical record, email your doctor's office, and request medication refills online. 1. In your internet browser, go to [email or web address] 2. Click on the First Time User? Click Here link in the Sign In box. You will see the New Member Sign Up page. 3. Enter your [employer] Access Code exactly as it appears below. You will not need to use this code after youve completed the sign-up process. If you do not sign up before the expiration date, you must request a new code. · [employer] Access Code: [email or web address] Expires: 11/12/2018 12:16 PM 
 
4. Enter the last four digits of your Social Security Number (xxxx) and Date of Birth (mm/dd/yyyy) as indicated and click Submit. You will be taken to the next sign-up page. 5. Create a [employer] ID. This will be your [employer] login ID and cannot be changed, so think of one that is secure and easy to remember. 6. Create a [employer] password. You can change your password at any time. 7. Enter your Password Reset Question and Answer. This can be used at a later time if you forget your password. 8. Enter your e-mail address. You will receive e-mail notification when new information is available in [address] 9. Click Sign Up. You can now view and download portions of your medical record. 10. Click the Download Summary menu link to download a portable copy of your medical information. If you have questions, please visit the Frequently Asked Questions section of the [employer] website. Remember, [employer] is NOT to be used for urgent needs. For medical emergencies, dial 911. Now available from your iPhone and Android! Please provide this summary of care documentation to your next provider. Your primary care clinician is listed as Flavio Camilo. If you have any questions after today's visit, please call [phone].

## 2018-10-03 LAB
ALBUMIN/CREAT UR: 5.1 MG/G CREAT (ref 0–30)
CREAT UR-MCNC: 281 MG/DL
MICROALBUMIN UR-MCNC: 14.4 UG/ML

## 2018-10-24 DIAGNOSIS — E11.8 CONTROLLED TYPE 2 DIABETES MELLITUS WITH COMPLICATION, WITHOUT LONG-TERM CURRENT USE OF INSULIN (HCC): ICD-10-CM

## 2018-10-25 RX ORDER — LANCETS 28 GAUGE
EACH MISCELLANEOUS
Qty: 100 LANCET | Refills: 3 | Status: SHIPPED | OUTPATIENT
Start: 2018-10-25 | End: 2022-07-21 | Stop reason: SDUPTHER

## 2018-12-19 DIAGNOSIS — E13.49 OTHER DIABETIC NEUROLOGICAL COMPLICATION ASSOCIATED WITH OTHER SPECIFIED DIABETES MELLITUS (HCC): ICD-10-CM

## 2018-12-20 RX ORDER — GABAPENTIN 600 MG/1
TABLET ORAL
Qty: 90 TAB | Refills: 0 | OUTPATIENT
Start: 2018-12-20

## 2019-01-03 DIAGNOSIS — F32.89 OTHER DEPRESSION: ICD-10-CM

## 2019-01-06 RX ORDER — DULOXETINE 40 MG/1
CAPSULE, DELAYED RELEASE ORAL
Qty: 30 CAP | Refills: 2 | Status: SHIPPED | OUTPATIENT
Start: 2019-01-06 | End: 2019-04-11 | Stop reason: SDUPTHER

## 2019-01-16 ENCOUNTER — HOSPITAL ENCOUNTER (OUTPATIENT)
Dept: GENERAL RADIOLOGY | Age: 64
Discharge: HOME | End: 2019-01-16
Payer: MEDICARE

## 2019-01-16 DIAGNOSIS — M54.2 CERVICALGIA: ICD-10-CM

## 2019-01-16 PROCEDURE — 72052 X-RAY EXAM NECK SPINE 6/>VWS: CPT

## 2019-01-24 ENCOUNTER — TELEPHONE (OUTPATIENT)
Dept: FAMILY MEDICINE CLINIC | Age: 64
End: 2019-01-24

## 2019-01-30 DIAGNOSIS — E03.9 ACQUIRED HYPOTHYROIDISM: ICD-10-CM

## 2019-01-30 RX ORDER — LEVOTHYROXINE SODIUM 100 UG/1
TABLET ORAL
Qty: 30 TAB | Refills: 1 | Status: SHIPPED | OUTPATIENT
Start: 2019-01-30 | End: 2019-01-31 | Stop reason: SDUPTHER

## 2019-01-31 DIAGNOSIS — E03.9 ACQUIRED HYPOTHYROIDISM: ICD-10-CM

## 2019-02-01 DIAGNOSIS — E11.9 TYPE 2 DIABETES MELLITUS WITHOUT COMPLICATION, WITHOUT LONG-TERM CURRENT USE OF INSULIN (HCC): ICD-10-CM

## 2019-02-04 RX ORDER — METFORMIN HYDROCHLORIDE 500 MG/1
TABLET ORAL
Qty: 60 TAB | Refills: 0 | Status: SHIPPED | OUTPATIENT
Start: 2019-02-04 | End: 2019-03-03 | Stop reason: SDUPTHER

## 2019-02-04 RX ORDER — LEVOTHYROXINE SODIUM 100 UG/1
100 TABLET ORAL
Qty: 30 TAB | Refills: 0 | Status: SHIPPED | OUTPATIENT
Start: 2019-02-04 | End: 2019-03-26 | Stop reason: SDUPTHER

## 2019-02-04 RX ORDER — GABAPENTIN 300 MG/1
600 CAPSULE ORAL 3 TIMES DAILY
Qty: 270 CAP | Refills: 0 | Status: SHIPPED | OUTPATIENT
Start: 2019-02-04 | End: 2019-03-18 | Stop reason: SDUPTHER

## 2019-02-04 RX ORDER — GABAPENTIN 300 MG/1
CAPSULE ORAL
Qty: 270 CAP | Refills: 0 | OUTPATIENT
Start: 2019-02-04

## 2019-02-05 DIAGNOSIS — E11.9 TYPE 2 DIABETES MELLITUS WITHOUT COMPLICATION, WITHOUT LONG-TERM CURRENT USE OF INSULIN (HCC): ICD-10-CM

## 2019-02-07 RX ORDER — METFORMIN HYDROCHLORIDE 500 MG/1
TABLET ORAL
Qty: 60 TAB | Refills: 0 | OUTPATIENT
Start: 2019-02-07

## 2019-02-21 ENCOUNTER — OFFICE VISIT (OUTPATIENT)
Dept: FAMILY MEDICINE CLINIC | Age: 64
End: 2019-02-21

## 2019-02-21 VITALS
SYSTOLIC BLOOD PRESSURE: 114 MMHG | DIASTOLIC BLOOD PRESSURE: 70 MMHG | OXYGEN SATURATION: 95 % | HEIGHT: 64 IN | RESPIRATION RATE: 20 BRPM | BODY MASS INDEX: 34.66 KG/M2 | HEART RATE: 84 BPM | TEMPERATURE: 98.1 F | WEIGHT: 203 LBS

## 2019-02-21 DIAGNOSIS — I10 ESSENTIAL HYPERTENSION: ICD-10-CM

## 2019-02-21 DIAGNOSIS — M75.01 ADHESIVE CAPSULITIS OF RIGHT SHOULDER: ICD-10-CM

## 2019-02-21 DIAGNOSIS — E78.00 HYPERCHOLESTEROLEMIA: ICD-10-CM

## 2019-02-21 DIAGNOSIS — E55.9 VITAMIN D DEFICIENCY: ICD-10-CM

## 2019-02-21 DIAGNOSIS — E03.9 ACQUIRED HYPOTHYROIDISM: ICD-10-CM

## 2019-02-21 DIAGNOSIS — E11.9 TYPE 2 DIABETES MELLITUS WITHOUT COMPLICATION, WITHOUT LONG-TERM CURRENT USE OF INSULIN (HCC): Primary | ICD-10-CM

## 2019-02-21 DIAGNOSIS — M48.02 CERVICAL SPINAL STENOSIS: ICD-10-CM

## 2019-02-21 NOTE — PROGRESS NOTES
1. Have you been to the ER, urgent care clinic since your last visit? Hospitalized since your last visit? No 
 
2. Have you seen or consulted any other health care providers outside of the [facility] since your last visit? Include any pap smears or colon screening. No 
 
Chief Complaint Patient presents with  Referral Request  
  Ortho Dr. Charisma Glass

## 2019-02-21 NOTE — PROGRESS NOTES
Chief Complaint Patient presents with  Referral Request  
  Ortho Dr. Ramana Harrell  
 
she is a 70y.o. year old female who presents for evalution. c/o right shoulder with very limited rom The neck also hurts. She has had recent xrays of the neck and rt shoulder. The neck shows ss in several vertebrae She is asking for a referral to ortho Dr Roxy Anguiano She also has not had any blood checked since last summer Reviewed PmHx, RxHx, FmHx, SocHx, AllgHx and updated and dated in the chart. Aspirin yes ____   No____ N/A____ Patient Active Problem List  
 Diagnosis  Type 2 diabetes with nephropathy (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  
 
Vitals:  
 02/21/19 1454 BP: 114/70 Pulse: 84 Resp: 20 Temp: 98.1 °F (36.7 °C) TempSrc: Oral  
SpO2: 95% Weight: 203 lb (92.1 kg) Height: 5' 4\" (1.626 m) Physical Examination: General appearance - alert, well appearing, and in no distress Mental status - alert, oriented to person, place, and time Chest - clear to auscultation, no wheezes, rales or rhonchi, symmetric air entry Heart - normal rate, regular rhythm, normal S1, S2, no murmurs, rubs, clicks or gallops Musculoskeletal - limited ROM but the rom increased after several attemps to extend the arms Assessment/ Plan:  
Diagnoses and all orders for this visit: 
 
1. Type 2 diabetes mellitus without complication, without long-term current use of insulin (HCC) 
-     HEMOGLOBIN A1C WITH EAG Needs to continue monitoring She is eating a lot more sweest and starches than she should 2. Acquired hypothyroidism Monitoring and will treat as indicated 3. Vitamin D deficiency 
-     VITAMIN D, 25 HYDROXY Will treat as indicated 4. Essential hypertension -     METABOLIC PANEL, COMPREHENSIVE Great control 5. Hypercholesterolemia -     LIPID PANEL 6. Cervical spinal stenosis -     REFERRAL TO NEUROSURGERY 
-     REFERRAL TO PHYSICAL THERAPY 7. Adhesive capsulitis of right shoulder 
-     REFERRAL TO ORTHOPEDIC SURGERY 
-     REFERRAL TO PHYSICAL THERAPY Follow-up Disposition: Not on File ICD-10-CM ICD-9-CM 1. Type 2 diabetes mellitus without complication, without long-term current use of insulin (HCC) E11.9 250.00 HEMOGLOBIN A1C WITH EAG 2. Acquired hypothyroidism E03.9 244.9 3. Vitamin D deficiency E55.9 268.9 VITAMIN D, 25 HYDROXY 4. Essential hypertension U30 856.8 METABOLIC PANEL, COMPREHENSIVE 5. Hypercholesterolemia E78.00 272.0 LIPID PANEL 6. Cervical spinal stenosis M48.02 723.0 REFERRAL TO NEUROSURGERY  
   REFERRAL TO PHYSICAL THERAPY 7. Adhesive capsulitis of right shoulder M75.01 726.0 REFERRAL TO ORTHOPEDIC SURGERY  
   REFERRAL TO PHYSICAL THERAPY I have discussed the diagnosis with the patient and the intended plan as seen in the above orders. The patient has received an after-visit summary and questions were answered concerning future plans. Medication Side Effects and Warnings were discussed with patient: yes Patient Labs were reviewed and or requested: yes Patient Past Records were reviewed and or requested: yes There are no Patient Instructions on file for this visit. The patient verbalizes understanding and agrees with the plan of care Patient has the advanced directives booklet to review

## 2019-02-22 LAB
25(OH)D3+25(OH)D2 SERPL-MCNC: 22.9 NG/ML (ref 30–100)
ALBUMIN SERPL-MCNC: 3.9 G/DL (ref 3.6–4.8)
ALBUMIN/GLOB SERPL: 1 {RATIO} (ref 1.2–2.2)
ALP SERPL-CCNC: 158 IU/L (ref 39–117)
ALT SERPL-CCNC: 10 IU/L (ref 0–32)
AST SERPL-CCNC: 9 IU/L (ref 0–40)
BILIRUB SERPL-MCNC: 0.4 MG/DL (ref 0–1.2)
BUN SERPL-MCNC: 18 MG/DL (ref 8–27)
BUN/CREAT SERPL: 21 (ref 12–28)
CALCIUM SERPL-MCNC: 9.3 MG/DL (ref 8.7–10.3)
CHLORIDE SERPL-SCNC: 97 MMOL/L (ref 96–106)
CHOLEST SERPL-MCNC: 154 MG/DL (ref 100–199)
CO2 SERPL-SCNC: 22 MMOL/L (ref 20–29)
CREAT SERPL-MCNC: 0.85 MG/DL (ref 0.57–1)
EST. AVERAGE GLUCOSE BLD GHB EST-MCNC: 123 MG/DL
GLOBULIN SER CALC-MCNC: 4.1 G/DL (ref 1.5–4.5)
GLUCOSE SERPL-MCNC: 118 MG/DL (ref 65–99)
HBA1C MFR BLD: 5.9 % (ref 4.8–5.6)
HDLC SERPL-MCNC: 66 MG/DL
INTERPRETATION, 910389: NORMAL
LDLC SERPL CALC-MCNC: 69 MG/DL (ref 0–99)
Lab: NORMAL
POTASSIUM SERPL-SCNC: 4.5 MMOL/L (ref 3.5–5.2)
PROT SERPL-MCNC: 8 G/DL (ref 6–8.5)
SODIUM SERPL-SCNC: 137 MMOL/L (ref 134–144)
TRIGL SERPL-MCNC: 93 MG/DL (ref 0–149)
TSH SERPL DL<=0.005 MIU/L-ACNC: 9.36 UIU/ML (ref 0.45–4.5)
VLDLC SERPL CALC-MCNC: 19 MG/DL (ref 5–40)

## 2019-03-03 DIAGNOSIS — E11.9 TYPE 2 DIABETES MELLITUS WITHOUT COMPLICATION, WITHOUT LONG-TERM CURRENT USE OF INSULIN (HCC): ICD-10-CM

## 2019-03-03 RX ORDER — METFORMIN HYDROCHLORIDE 500 MG/1
TABLET ORAL
Qty: 60 TAB | Refills: 0 | Status: SHIPPED | OUTPATIENT
Start: 2019-03-03 | End: 2019-04-05 | Stop reason: SDUPTHER

## 2019-03-19 RX ORDER — GABAPENTIN 300 MG/1
CAPSULE ORAL
Qty: 180 CAP | Refills: 0 | Status: SHIPPED | OUTPATIENT
Start: 2019-03-19 | End: 2019-04-20 | Stop reason: SDUPTHER

## 2019-03-20 RX ORDER — ERGOCALCIFEROL 1.25 MG/1
50000 CAPSULE ORAL
Qty: 8 CAP | Refills: 0 | Status: SHIPPED | OUTPATIENT
Start: 2019-03-20 | End: 2019-09-03 | Stop reason: SDUPTHER

## 2019-03-21 NOTE — PROGRESS NOTES
The vit D level is low. I am sending a prescription to your pharmacy The thyroid level is low. Are you taking the levothyroxine? I sent a refil in february The cholesterol level is at goal

## 2019-03-21 NOTE — PROGRESS NOTES
Spoke with pt advised of lab results/recommendations. Pt stated she was not taking Thyroid medication but she is going to start. Pt verbalized understanding and no further questions.

## 2019-03-23 RX ORDER — BUPROPION HYDROCHLORIDE 300 MG/1
TABLET ORAL
Qty: 30 TAB | Refills: 2 | Status: SHIPPED | OUTPATIENT
Start: 2019-03-23 | End: 2019-06-19 | Stop reason: SDUPTHER

## 2019-03-26 ENCOUNTER — OFFICE VISIT (OUTPATIENT)
Dept: FAMILY MEDICINE CLINIC | Age: 64
End: 2019-03-26

## 2019-03-26 VITALS
TEMPERATURE: 98.3 F | DIASTOLIC BLOOD PRESSURE: 83 MMHG | OXYGEN SATURATION: 95 % | BODY MASS INDEX: 36.19 KG/M2 | WEIGHT: 212 LBS | SYSTOLIC BLOOD PRESSURE: 135 MMHG | RESPIRATION RATE: 19 BRPM | HEART RATE: 99 BPM | HEIGHT: 64 IN

## 2019-03-26 DIAGNOSIS — H91.93 ACUTE HEARING LOSS OF BOTH EARS: Primary | ICD-10-CM

## 2019-03-26 DIAGNOSIS — E03.9 ACQUIRED HYPOTHYROIDISM: ICD-10-CM

## 2019-03-26 RX ORDER — PSEUDOEPHEDRINE HCL 30 MG
30 TABLET ORAL
Qty: 80 TAB | Refills: 1 | Status: SHIPPED | OUTPATIENT
Start: 2019-03-26 | End: 2019-08-28

## 2019-03-26 RX ORDER — LEVOTHYROXINE SODIUM 100 UG/1
100 TABLET ORAL
Qty: 30 TAB | Refills: 0 | Status: SHIPPED | OUTPATIENT
Start: 2019-03-26 | End: 2019-04-23 | Stop reason: SDUPTHER

## 2019-03-26 RX ORDER — CETIRIZINE HCL 10 MG
10 TABLET ORAL
Qty: 30 TAB | Refills: 3 | Status: SHIPPED | OUTPATIENT
Start: 2019-03-26 | End: 2019-08-28

## 2019-03-26 NOTE — PROGRESS NOTES
1. Have you been to the ER, urgent care clinic since your last visit? Hospitalized since your last visit? No 
 
2. Have you seen or consulted any other health care providers outside of the [address] [place] [address] since your last visit? Include any pap smears or colon screening. No  
 
Chief Complaint Patient presents with  Ear Fullness  
  bilateral ear

## 2019-03-26 NOTE — PROGRESS NOTES
Chief Complaint Patient presents with  Ear Fullness  
  bilateral ear  
 
she is a 70y.o. year old female who presents for evalution. Both ears feel full and a pressure feeling. No pain just makes hearing muffled Her thyroid was low last month She was not complaint at that time. She has been taking it recently Reviewed PmHx, RxHx, FmHx, SocHx, AllgHx and updated and dated in the chart. Aspirin yes ____   No____ N/A____ Patient Active Problem List  
 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  
 
Vitals:  
 03/26/19 1506 BP: 135/83 Pulse: 99 Resp: 19 Temp: 98.3 °F (36.8 °C) TempSrc: Oral  
SpO2: 95% Weight: 212 lb (96.2 kg) Height: 5' 4\" (1.626 m) Physical Examination: General appearance - alert, well appearing, and in no distress Mental status - alert, oriented to person, place, and time Ears - bilateral TM's and external ear canals normal 
 
 
Assessment/ Plan:  
Diagnoses and all orders for this visit: 
 
1. Acute hearing loss of both ears 
-     REFERRAL TO ENT-OTOLARYNGOLOGY try treating as allergy Refer to ent foer further eval 
 
  ICD-10-CM ICD-9-CM 1. Acute hearing loss of both ears H91.93 389.9 REFERRAL TO ENT-OTOLARYNGOLOGY I have discussed the diagnosis with the patient and the intended plan as seen in the above orders. The patient has received an after-visit summary and questions were answered concerning future plans. Medication Side Effects and Warnings were discussed with patient: yes Patient Labs were reviewed and or requested: yes Patient Past Records were reviewed and or requested: yes There are no Patient Instructions on file for this visit. The patient verbalizes understanding and agrees with the plan of care Patient has the advanced directives booklet to review

## 2019-03-27 ENCOUNTER — TELEPHONE (OUTPATIENT)
Dept: FAMILY MEDICINE CLINIC | Age: 64
End: 2019-03-27

## 2019-03-29 NOTE — TELEPHONE ENCOUNTER
Spoke with pt and advised per Dr. Jerod Glass to come in office to provide a urine specimen to rule out UTI. Pt verbalized understanding and stated she will be in on Monday.

## 2019-03-31 DIAGNOSIS — I10 ESSENTIAL HYPERTENSION: ICD-10-CM

## 2019-03-31 RX ORDER — CARVEDILOL 25 MG/1
TABLET ORAL
Qty: 180 TAB | Refills: 0 | Status: SHIPPED | OUTPATIENT
Start: 2019-03-31 | End: 2019-08-28

## 2019-04-05 DIAGNOSIS — E11.9 TYPE 2 DIABETES MELLITUS WITHOUT COMPLICATION, WITHOUT LONG-TERM CURRENT USE OF INSULIN (HCC): ICD-10-CM

## 2019-04-08 RX ORDER — METFORMIN HYDROCHLORIDE 500 MG/1
TABLET ORAL
Qty: 60 TAB | Refills: 2 | Status: SHIPPED | OUTPATIENT
Start: 2019-04-08 | End: 2019-04-25 | Stop reason: SDUPTHER

## 2019-04-11 DIAGNOSIS — F32.89 OTHER DEPRESSION: ICD-10-CM

## 2019-04-11 RX ORDER — DULOXETINE 40 MG/1
CAPSULE, DELAYED RELEASE ORAL
Qty: 30 CAP | Refills: 0 | Status: SHIPPED | OUTPATIENT
Start: 2019-04-11 | End: 2019-05-16 | Stop reason: SDUPTHER

## 2019-04-23 DIAGNOSIS — E03.9 ACQUIRED HYPOTHYROIDISM: ICD-10-CM

## 2019-04-24 RX ORDER — LEVOTHYROXINE SODIUM 100 UG/1
TABLET ORAL
Qty: 30 TAB | Refills: 2 | Status: SHIPPED | OUTPATIENT
Start: 2019-04-24 | End: 2019-10-07 | Stop reason: SDUPTHER

## 2019-04-24 RX ORDER — GABAPENTIN 300 MG/1
CAPSULE ORAL
Qty: 180 CAP | Refills: 0 | Status: SHIPPED | OUTPATIENT
Start: 2019-04-24 | End: 2019-06-05 | Stop reason: SDUPTHER

## 2019-04-25 DIAGNOSIS — E11.9 TYPE 2 DIABETES MELLITUS WITHOUT COMPLICATION, WITHOUT LONG-TERM CURRENT USE OF INSULIN (HCC): ICD-10-CM

## 2019-04-26 ENCOUNTER — TELEPHONE (OUTPATIENT)
Dept: FAMILY MEDICINE CLINIC | Age: 64
End: 2019-04-26

## 2019-04-26 NOTE — TELEPHONE ENCOUNTER
Patient requesting referral the following:  Lynsey Liang  [address] [facility] [facility] [address]  Fax: [phone]    Patient has appointment 5/9/19   In regard to right hip surgery

## 2019-04-29 DIAGNOSIS — M25.551 RIGHT HIP PAIN: Primary | ICD-10-CM

## 2019-04-30 RX ORDER — METFORMIN HYDROCHLORIDE 500 MG/1
TABLET ORAL
Qty: 180 TAB | Refills: 2 | Status: SHIPPED | OUTPATIENT
Start: 2019-04-30 | End: 2019-09-09 | Stop reason: SDUPTHER

## 2019-06-10 RX ORDER — GABAPENTIN 300 MG/1
CAPSULE ORAL
Qty: 180 CAP | Refills: 0 | Status: SHIPPED | OUTPATIENT
Start: 2019-06-10 | End: 2019-07-08 | Stop reason: SDUPTHER

## 2019-06-17 ENCOUNTER — HOSPITAL ENCOUNTER (OUTPATIENT)
Dept: GENERAL RADIOLOGY | Age: 64
Discharge: HOME | End: 2019-06-17
Payer: MEDICARE

## 2019-06-17 DIAGNOSIS — M25.551 RIGHT HIP PAIN: ICD-10-CM

## 2019-06-17 PROCEDURE — 73502 X-RAY EXAM HIP UNI 2-3 VIEWS: CPT

## 2019-06-19 RX ORDER — BUPROPION HYDROCHLORIDE 300 MG/1
TABLET ORAL
Qty: 30 TAB | Refills: 0 | Status: SHIPPED | OUTPATIENT
Start: 2019-06-19 | End: 2019-07-18 | Stop reason: SDUPTHER

## 2019-07-08 DIAGNOSIS — M05.79 SEROPOSITIVE RHEUMATOID ARTHRITIS OF MULTIPLE SITES (HCC): Primary | ICD-10-CM

## 2019-07-08 DIAGNOSIS — M54.9 OTHER CHRONIC BACK PAIN: ICD-10-CM

## 2019-07-08 DIAGNOSIS — G89.29 OTHER CHRONIC BACK PAIN: ICD-10-CM

## 2019-07-09 RX ORDER — GABAPENTIN 300 MG/1
CAPSULE ORAL
Qty: 180 CAP | Refills: 0 | Status: SHIPPED | OUTPATIENT
Start: 2019-07-09 | End: 2019-12-17 | Stop reason: SDUPTHER

## 2019-08-04 NOTE — TELEPHONE ENCOUNTER
Patient stating that [employer] is requesting she needs a Referral from the PCP to see the surgeon that the Pain specialist that we referred her to recommended. I need more into of the Surgeon and CPT and Dx codes and specific info to be able to go into the [facility] portal to get this PA after  even puts a order in for this.     Please advise thanks
noted
normal...

## 2019-08-28 ENCOUNTER — OFFICE VISIT (OUTPATIENT)
Dept: RHEUMATOLOGY | Age: 64
End: 2019-08-28

## 2019-08-28 ENCOUNTER — DOCUMENTATION ONLY (OUTPATIENT)
Dept: PHARMACY | Age: 64
End: 2019-08-28

## 2019-08-28 VITALS
HEIGHT: 64 IN | BODY MASS INDEX: 37.39 KG/M2 | TEMPERATURE: 98.5 F | RESPIRATION RATE: 18 BRPM | SYSTOLIC BLOOD PRESSURE: 160 MMHG | HEART RATE: 118 BPM | WEIGHT: 219 LBS | DIASTOLIC BLOOD PRESSURE: 89 MMHG

## 2019-08-28 DIAGNOSIS — Z79.52 LONG TERM (CURRENT) USE OF SYSTEMIC STEROIDS: ICD-10-CM

## 2019-08-28 DIAGNOSIS — E55.9 VITAMIN D DEFICIENCY: ICD-10-CM

## 2019-08-28 DIAGNOSIS — M05.79 SEROPOSITIVE RHEUMATOID ARTHRITIS OF MULTIPLE SITES (HCC): Primary | ICD-10-CM

## 2019-08-28 DIAGNOSIS — M81.0 AGE-RELATED OSTEOPOROSIS WITHOUT CURRENT PATHOLOGICAL FRACTURE: ICD-10-CM

## 2019-08-28 DIAGNOSIS — Z79.60 LONG-TERM USE OF IMMUNOSUPPRESSANT MEDICATION: ICD-10-CM

## 2019-08-28 RX ORDER — LIDOCAINE 50 MG/G
PATCH TOPICAL EVERY 24 HOURS
COMMUNITY
End: 2019-11-27

## 2019-08-28 RX ORDER — POLYETHYLENE GLYCOL 3350 17 G/17G
17 POWDER, FOR SOLUTION ORAL DAILY
COMMUNITY
End: 2019-11-27

## 2019-08-28 RX ORDER — TRAMADOL HYDROCHLORIDE 50 MG/1
50 TABLET ORAL
COMMUNITY
End: 2019-11-27

## 2019-08-28 RX ORDER — NAPROXEN SODIUM 220 MG
220 TABLET ORAL 2 TIMES DAILY WITH MEALS
COMMUNITY
End: 2019-11-27

## 2019-08-28 RX ORDER — BISMUTH SUBSALICYLATE 262 MG
1 TABLET,CHEWABLE ORAL DAILY
COMMUNITY
End: 2019-11-27

## 2019-08-28 RX ORDER — PREDNISONE 5 MG/1
TABLET ORAL DAILY
COMMUNITY
End: 2019-11-27

## 2019-08-28 RX ORDER — GLUCOSAMINE/CHONDR SU A SOD 750-600 MG
TABLET ORAL DAILY
COMMUNITY
End: 2019-11-27

## 2019-08-28 RX ORDER — FOLIC ACID 1 MG/1
TABLET ORAL
Qty: 90 TAB | Refills: 0 | Status: SHIPPED | OUTPATIENT
Start: 2019-08-28 | End: 2021-07-01

## 2019-08-28 RX ORDER — METHOTREXATE 2.5 MG/1
20 TABLET ORAL
Qty: 96 TAB | Refills: 0 | Status: SHIPPED | OUTPATIENT
Start: 2019-08-29 | End: 2019-11-27

## 2019-08-28 RX ORDER — LANOLIN ALCOHOL/MO/W.PET/CERES
250 CREAM (GRAM) TOPICAL DAILY
COMMUNITY
End: 2021-07-01

## 2019-08-28 NOTE — PATIENT INSTRUCTIONS
Resume methotrexate 20 mg (8 tablets) every Friday  Resume daily folic acid    I will resume Orencia    Abatacept (By injection)   Abatacept (a-BAT-a-sept)  Treats moderate to severe rheumatoid arthritis (RA) and psoriatic arthritis in adults and moderate to severe juvenile idiopathic arthritis in children 3years of age or older. Brand Name(s): Orencia, Orencia ClickJect Autoinjector, Orencia Pre-Filled Syringe   There may be other brand names for this medicine. When This Medicine Should Not Be Used: This medicine is not right for everyone. Do not use it if you had an allergic reaction to abatacept. How to Use This Medicine:   Injectable  · A nurse or other trained health professional will give you this medicine. This medicine is given as a shot under your skin or into a vein. · You may be taught how to give your medicine at home. Make sure you understand all instructions before giving yourself an injection. Do not use more medicine or use it more often than your doctor tells you to. · This medicine is available in 3 forms: a vial (glass container), a prefilled syringe, or a ClickJect autoinjector. The prefilled syringe and ClickJect autoinjector are the dosage forms you can use at home. · You will be shown the body areas where this shot can be given. Use a different body area each time you give yourself a shot. Keep track of where you give each shot to make sure you rotate body areas. Do not inject into skin areas that are red, bruised, tender, scaly, or hard, or have scars or stretch marks. · Use a new needle and syringe each time you inject your medicine. · Allow the medicine to warm to room temperature for 30 minutes before you use it. Do not warm it in any other way. · Do not remove the needle cover from the prefilled syringe or ClickJect autoinjector until you are ready to use it. · Check the liquid in the prefilled syringe or ClickJect autoinjector.  It should be clear and colorless or slightly yellow. Do not use the medicine if the liquid is cloudy, discolored, if you see particles in it, or if the prefilled syringe or ClickJect autoinjector looks cracked or broken. · If the amount of liquid in the prefilled syringe does not fall at or just above the fill line, do not use that syringe. · Read and follow the patient instructions that come with this medicine. Talk to your doctor or pharmacist if you have any questions. · Missed dose: Call your doctor or pharmacist for instructions. · If you store this medicine at home, keep it in the refrigerator. Do not freeze. · Throw away used needles in a hard, closed container that the needles cannot poke through. Keep this container away from children and pets. Drugs and Foods to Avoid:   Ask your doctor or pharmacist before using any other medicine, including over-the-counter medicines, vitamins, and herbal products. · Some medicines can affect how abatacept works. Tell your doctor if you are using adalimumab, anakinra, certolizumab, etanercept, golimumab, infliximab, rituximab, or tocilizumab. · While you are being treated with this medicine or within 3 months after using it, do not have any vaccines without your doctor's approval. Your child's vaccines need to be current before treatment with abatacept. Warnings While Using This Medicine:   · Tell your doctor if you are pregnant or breastfeeding, or if you have cancer, diabetes, lung or breathing problems (including COPD), any infection (including the flu), hepatitis B, tuberculosis (TB), or are scheduled to have surgery. · This medicine may cause infections. · You will need a skin test for TB before you start using this medicine. Tell your doctor if you or anyone in your home has ever had a positive reaction to a TB skin test.  · Your doctor will do lab tests at regular visits to check on the effects of this medicine. Keep all appointments. · Keep all medicine out of the reach of children. Never share your medicine with anyone. Possible Side Effects While Using This Medicine:   Call your doctor right away if you notice any of these side effects:  · Allergic reaction: Itching or hives, swelling in your face or hands, swelling or tingling in your mouth or throat, chest tightness, trouble breathing  · Change in how much or how often you urinate, painful urination  · Dizziness or lightheadedness  · Fast, slow, or pounding heartbeat  · Fever, chills, cough, sore throat, runny or stuffy nose, tiredness, and body aches  · Trouble breathing  If you notice these less serious side effects, talk with your doctor:   · Back pain  · Headache  · Nausea, stomach upset  · Pain, itching, burning, redness, swelling, or a lump under your skin where the shot is given  If you notice other side effects that you think are caused by this medicine, tell your doctor. Call your doctor for medical advice about side effects. You may report side effects to FDA at 5-530-LNQ-8138  © 2017 [facility] Information is for End User's use only and may not be sold, redistributed or otherwise used for commercial purposes. The above information is an  only. It is not intended as medical advice for individual conditions or treatments. Talk to your doctor, nurse or pharmacist before following any medical regimen to see if it is safe and effective for you.

## 2019-08-28 NOTE — PROGRESS NOTES
REASON FOR VISIT    This is a follow-up visit for Ms. Johnson for     ICD-10-CM   1. Seropositive rheumatoid arthritis of multiple sites ([facility] M05.79     Inflammatory arthritis phenotype includes:  Anti-CCP positive: yes (73)  Rheumatoid factor positive: yes (86.2)  Erosive disease: no  Extra-articular manifestations include: none    Immunosuppression Screening (1/24/2018):   Quantiferon TB: negative  PPD:  Not performed  Hepatitis B: negative  Hepatitis C: negative    Therapy History includes:  Current DMARD therapy includes: none  Prior DMARD therapy includes: methotrexate 20 mg every Saturday, Rituximab (8/07/2017-8/18/2017), Arava, Enbrel, Humira, Orencia, Actemra infusion (12/16/2016-7/2017)   The following DMARDs have been ineffective: methotrexate (6 months), Onencia SQ (4 months), Actemra   The following DMARDs were stopped because of side effects: Arava (hepatotoxicity), Humira (elbow infection from MRSA s/p 3 surgeries)     Osteoporosis Historical Synopsis     Height loss since age 25 (at least two inches): 2.5  Fracture history includes: no  Family history of hip fracture: no  Fall Risk: yes     Daily calcium intake is 1,000 mg  Daily vitamin D intake is 50,000 per week     Smoking history: yes (former smoker of 22 years)  Alcohol consumption: no  Prednisone history: yes (25 mg for more than several years)     Exercise: yes     Previous work-up for osteoporosis includes the following:  DEXA Scan: 2015  Vitamin 25OH D level: 22.9 (2/21/2019)  TSH: 9.360 (2/21/2019)  PTH: 16 (1/24/2018)     Therapy History includes:  Current osteoporosis therapy includes: none  Prior osteoporosis therapy includes: alendronate 70 mg weekly (1 year)  The following osteoporosis have been ineffective: none  The following osteoporosis were stopped because of side effects: none    Immunizations:   Immunization History   Administered Date(s) Administered    Influenza Vaccine 09/06/2016    Influenza Vaccine (Quad) 11/19/2015    Influenza Vaccine (Quad) PF 10/25/2017, 10/02/2018    Pneumococcal Polysaccharide (PPSV-23) 07/17/2017     Active problems include:    Patient Active Problem List   Diagnosis Code    Arthritis of knee M17.10    Lumbar stenosis with neurogenic claudication M48.062    Seropositive rheumatoid arthritis of multiple sites ([facility] M05.79    Easy bruising R23.8    Acquired hypothyroidism E03.9    Postmenopausal depression F32.89    Hypercholesterolemia E78.00    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 R61    Long term (current) use of systemic steroids Z79.52    Long term current use of immunosuppressive drug Z79.899    Essential hypertension I10    Controlled type 2 diabetes mellitus with complication, without long-term current use of insulin ([facility]) E11.8    Morbid obesity with BMI of 45.0-49.9, adult ([facility]) E66.01, Z68.42    Vitamin D deficiency E55.9    Osteopenia M85.80    Severe episode of recurrent major depressive disorder, without psychotic features ([facility] F33.2    Long-term use of immunosuppressant medication Z79.899    Type 2 diabetes with nephropathy ([facility]) E11.21     HISTORY OF PRESENT ILLNESS    Ms. Geni Wasserman returns for a follow-up visit. On her last visit on 1/24/2018, I continued methotrexate 20 mg weekly Saturday and Rituximab infusions. She has been off therapy due to lack of follow up. She is on prednisone 5 mg daily. She did not continue rituximab due to cost. She asks if she can resume Orencia since it was helping. Today, she complains of pain in her shoulders, elbows, wrists, hands, ankles, and feet. Her pain is aching and with certain movements, there is stabbing. She feels better with OT. She has morning stiffness more than hours. She has swelling in her left elbows, wrists, and hands. She has a bump on the top of her left foot. She has neuropathy. She endorses interval fall with acute mildly displaced periprosthetic fracture of the right acetabulum.  She is in rehab and is following with orthopedics, Dr. Mejia Lu.     She denies fever, weight loss, blurred vision, vision loss, oral ulcers, ankle swelling, dry cough, dyspnea, nausea, vomiting, dysphagia, abdominal pain, black or bloody stool, rash, easy bruising and increased thirst.    Last toxicity monitoring by blood work was done on 2/21/2019 and did not reveal any significant adverse effects, except     Most recent inflammatory markers from 6/05/2017 revealed a ESR 1 mm/hr (previously 27 mm/hr) and CRP 0.29 mg/L (previously 3.8 mg/L). The patient has not had any interval hospital admissions, infections, or surgeries. REVIEW OF SYSTEMS    A comprehensive review of systems was performed and pertinent results are documented in the HPI, review of systems is otherwise non-contributory. PAST MEDICAL HISTORY    She has a past medical history of Anxiety, Arrhythmia, Arthritis, Arthritis of knee (1/30/2012), Autoimmune disease ([address] Bronchitis, Chronic pain, Depression, Dyslipidemia, Morbid obesity ([address] Nausea & vomiting, Other screening mammogram (8/19/15), Overweight(278.02), Papanicolaou smear for cervical cancer screening (8/14/15), Rheumatoid aortitis, and SVT (supraventricular tachycardia) ([address] (1993). FAMILY HISTORY    Her family history includes Breast Cancer in an other family member; Diabetes in her brother and mother; Heart Disease in her brother and father. SOCIAL HISTORY    She reports that she quit smoking about 6 years ago. She has a 10.00 pack-year smoking history. She has never used smokeless tobacco. She reports that she does not drink alcohol or use drugs. MEDICATIONS    Current Outpatient Medications   Medication Sig Dispense Refill    Biotin 2,500 mcg cap Take  by mouth daily.  lidocaine (LIDODERM) 5 % by TransDERmal route every twenty-four (24) hours. Apply patch to the affected area for 12 hours a day and remove for 12 hours a day.       predniSONE (DELTASONE) 5 mg tablet Take  by mouth daily.  cyanocobalamin (VITAMIN B-12) 500 mcg tablet Take 250 mcg by mouth daily.  traMADol (ULTRAM) 50 mg tablet Take 50 mg by mouth every six (6) hours as needed for Pain.  naproxen sodium (NAPROSYN) 220 mg tablet Take 220 mg by mouth two (2) times daily (with meals).  multivitamin (ONE A DAY) tablet Take 1 Tab by mouth daily.  polyethylene glycol (MIRALAX) 17 gram/dose powder Take 17 g by mouth daily.  abatacept (ORENCIA) 125 mg/mL syrg 1 mL by SubCUTAneous route every seven (7) days. 4 Syringe 11    [START ON 8/29/2019] methotrexate (RHEUMATREX) 2.5 mg tablet Take 8 Tabs by mouth Every Thursday. 96 Tab 0    folic acid (FOLVITE) 1 mg tablet TAKE 1 TABLET BY MOUTH DAILY 90 Tab 0    buPROPion XL (WELLBUTRIN XL) 300 mg XL tablet TAKE 1 TABLET BY MOUTH EVERY MORNING 30 Tab 1    gabapentin (NEURONTIN) 300 mg capsule TAKE 2 CAPSULES BY MOUTH THREE TIMES DAILY (Patient taking differently: 800 mg.) 180 Cap 0    atorvastatin (LIPITOR) 40 mg tablet TAKE 1 TABLET BY MOUTH DAILY 30 Tab 2    metFORMIN (GLUCOPHAGE) 500 mg tablet TAKE 1 TABLET BY MOUTH TWICE DAILY WITH MEALS 180 Tab 2    levothyroxine (SYNTHROID) 100 mcg tablet TAKE 1 TABLET BY MOUTH DAILY BEFORE BREAKFAST (Patient taking differently: 125 mcg.) 30 Tab 2    lancets (FREESTYLE LANCETS) 28 gauge misc CHECK BLOOD SUGAR ONCE DAILY 100 Lancet 3    glucose blood VI test strips (FREESTYLE TEST) strip CHECK BLOOD SUGAR DAILY 100 Strip 0    HYDROcodone-acetaminophen (NORCO) 5-325 mg per tablet TK 1 T PO  UP TO tid PRN  0    Blood-Glucose Meter (BLOOD GLUCOSE MONITORING) monitoring kit E11.8  To check blood sugar daily 1 Kit 0    ergocalciferol (ERGOCALCIFEROL) 50,000 unit capsule Take 1 Cap by mouth every seven (7) days. 8 Cap 0    RITUXIMAB (RITUXAN IV) by IntraVENous route.           ALLERGIES    No Known Allergies    PHYSICAL EXAMINATION    Visit Vitals  /89   Pulse (!) 118   Temp 98.5 °F (36.9 °C)   Resp 18   Ht 5' 4\" (1.626 m)   Wt 219 lb (99.3 kg)   BMI 37.59 kg/m²     Body mass index is 37.59 kg/m². General: Patient is alert, oriented x 3, not in acute distress     HEENT:   Sclerae are not injected and appear moist.  There is no alopecia. Neck is supple. Cardiovascular:  Heart is regular rate and rhythm, no murmurs. Chest:  Lungs are clear to auscultation bilaterally. No rhonchi, wheezes, or crackles. Abdomen:  Obese. Extremities:  Free of clubbing, cyanosis, edema    Neurological exam:  Muscle strength is full in upper and lower extremities     Skin exam:  There are no alopecia, no discoid lesions, no active Raynaud's, no livedo reticularis, no periungual erythema. Multiple small skin tears and bruising    Musculoskeletal exam:  A comprehensive musculoskeletal exam was performed for all joints of each upper and lower extremity and assessed for swelling, tenderness and range of motion.  Positive results are documented as below:    Bilateral CMC crepitus  Left Elbow flexion contracture  Decreased of ROM of wrists  Bilateral ankle synovitis (left more than left)  Bilateral MTP tenderness    Joint Count 8/28/2019 1/24/2018 10/24/2017 6/21/2017 3/6/2017 1/17/2017 11/3/2016   Patient pain (0-100) 85 95 - 85 70 35 80   MHAQ 1.375 1.75 - 1.75 1.5 1.5 1.38   Left shoulder - Tender 1 1 - 1 - - -   Left shoulder - Swollen 0 - - - - - -   Left elbow - Tender 1 1 - 1 - - -   Left elbow - Swollen 1 1 - 1 - - -   Left wrist- Tender 1 1 1 - - - -   Left wrist- Swollen 1 1 1 1 - - -   Left 1st MCP - Tender 0 - 1 1 - - -   Left 1st MCP - Swollen 1 - 1 1 - - -   Left 2nd MCP - Tender 0 - 1 1 - - -   Left 2nd MCP - Swollen 1 - 1 1 - - -   Left 3rd MCP - Tender 0 - 1 1 - - -   Left 3rd MCP - Swollen - - 1 1 - - -   Left 4th MCP - Tender - - - 1 - - -   Left 4th MCP - Swollen - - - 1 - - -   Left 5th MCP - Tender - - - 1 - - -   Left 5th MCP - Swollen - - - - - - -   Left thumb IP - Tender - - - - - - -   Left thumb IP - Swollen - - - - - - -   Left 2nd PIP - Tender - - - 1 - - -   Left 2nd PIP - Swollen - - - 1 - - -   Left 3rd PIP - Tender - 1 1 1 - - -   Left 3rd PIP - Swollen - 1 1 1 - - -   Left 4th PIP - Tender - - 1 1 - - -   Left 4th PIP - Swollen - - - 1 - - -   Left 5th PIP - Tender - - - - - - -   Right shoulder - Tender 1 1 - 1 - - -   Right shoulder - Swollen 1 - - - - - -   Right elbow - Tender 0 - - 1 - - -   Right elbow - Swollen 1 1 - - - - -   Right wrist- Tender 1 1 1 1 - - -   Right wrist- Swollen 1 1 1 1 - - -   Right 1st MCP - Tender - 1 1 - - - -   Right 1st MCP - Swollen - - 1 - - - -   Right 2nd MCP - Tender - 1 1 1 - - -   Right 2nd MCP - Swollen - - 1 - - - -   Right 3rd MCP - Tender 0 1 1 1 - - -   Right 3rd MCP - Swollen 1 1 1 1 - - -   Right 4th MCP - Tender - 1 - 1 - - -   Right 4th MCP - Swollen - - - - - - -   Right 5th MCP - Tender - 1 - 1 - - -   Right 5th MCP - Swollen - - - - - - -   Right thumb IP - Tender - 1 1 - - - -   Right thumb IP - Swollen - - - - - - -   Right 2nd PIP - Tender - 1 1 1 - - -   Right 2nd PIP - Swollen - 1 1 1 - - -   Right 3rd PIP - Tender 1 1 1 1 - - -   Right 3rd PIP - Swollen 1 1 1 1 - - -   Right 4th PIP - Tender - 1 - 1 - - -   Right 4th PIP - Swollen - - - 1 - - -   Right 5th PIP - Tender - 1 - - - - -   Right 5th PIP - Swollen - - - - - - -   Tender Joint Count (Total) 6 16 13 20 - - -   Swollen Joint Count (Total) 9 8 11 14 - - -   Physician Assessment (0-10) 2 4 - 6 - - -   Patient Assessment (0-10) 7.5 9.5 - 9 7.5 6 7   CDAI Total (calculated) 24.5 37.5 - 49 - - -       DATA REVIEW    Laboratory     Recent laboratory results were reviewed, summarized, and discussed with the patient. Imaging    Musculoskeletal Ultrasound    None    Radiographs    Right Hip 6/17/2019: acute mildly displaced periprosthetic fracture of the right acetabulum. Postsurgical changes of bilateral total hip arthroplasty.  Partially visualized postsurgical changes of lumbosacral posterior spinal fusion and decompression. Mild degenerative changes of the SI joints. Vascular calcifications. Cervical Spine 1/16/2019: The spinal alignment is normal. Loss of intervertebral disc height at C5-6. Facet arthropathy C5-6 C3-4 C4-5. Foraminal stenoses C3-4, C4-5, C5-6 and C6-7. Vertebral morphology is normal.  The intervertebral disc height is well-preserved. There are no identifiable paravertebral soft tissue abnormalities. Prevertebral soft tissues unremarkable. Atlanto-dental interval within normal limits. No evidence of subluxation. Bilateral Shoulder 8/29/2018: RIGHT: moderate to severe glenohumeral joint space narrowing with spur formation. No evidence of fracture. The imaged right lung is clear. No soft tissue calcification. LEFT: severe narrowing of the glenohumeral joint space with spur formation. No evidence of fracture. Downward displacement of the humeral head suggestive of glenohumeral joint effusion. Included portions of the left lung are clear.     Bilateral Knee 1/24/2018: bilateral knee replacement which appear in anatomic alignment without evidence of loosening. There is no effusion. Chest 10/24/2017: a normal cardiomediastinal silhouette. The lungs are adequately expanded. There is no edema, effusion, consolidation, or pneumothorax. The osseous structures are unremarkable.     Chest 6/21/2017: Lungs: The lungs are clear of mass, nodule, airspace disease or edema. Pleura: There is no pleural effusion or pneumothorax. Mediastinum: The cardiac and mediastinal contours and pulmonary vascularity are normal.  The aorta is atherosclerotic. Bones and soft tissues: There are mild degenerative changes of the spine. Left Elbow 10/13/2016: mild to moderate diffuse joint space loss with moderate-sized marginal osteophytes. An elbow effusion is evident. Bones are moderately osteopenic. No acute fracture or dislocation is shown.     Bilateral Hand 10/13/2016: moderate diffuse osteopenia. Bilateral carpal soft tissue swelling is shown which is greater on the right. Severe joint space loss is demonstrated in the radiocarpal joints bilaterally with scapholunate advanced collapse of the right. Right capitate abuts the distal radius. Incongruence on the left is shown between the scaphoid and lunate with mild interspace widening indicating early scapholunate advanced collapse. Small right bilateral distal radioulnar joint osteophytes are shown. A 2 to 3 mm loose body is shown on the right. There is moderate bilateral first CMC osteoarthrosis with lateral subluxation and tiny marginal osteophytes. Mild to moderate uniform joint space narrowing is shown throughout the metacarpophalangeal joints with only minimal bilateral second and left third MCP joint osteophyte formation. Mild MCP level bilateral soft tissue swelling is present. Digital assessment shows mild joint space narrowing of the right second and third and the left third DIP joints. Tiny osteophytes are shown at these articulations as well as a small periosteal calcification along the medial aspect of the right ring finger middle phalangeal head. No definite erosions are identified nor is there soft tissue calcifications indicative of calcium pyrophosphate dihydrate deposition. Bilateral Foot 10/13/2016: chronic appearing fracture at the base of the right fifth metatarsal bone with fracture gap widening measuring up to 6 mm and mild adjacent soft tissue swelling. There is no other evidence for acute or chronic fracture or dislocation. Mild bilateral first metatarsophalangeal joint osteoarthrosis is demonstrated with tiny marginal osteophytes. Mild osseous hypertrophy of the medial first metatarsal heads is shown bilaterally with nonmarginal erosions with sclerotic margins and mild overlying soft tissue swelling. There is also mild valgus at posterior first MTP joints would lateral subluxation of the hallux sesamoid bones.  Soft tissue swelling also overlies the fifth metatarsal heads bilaterally. On the right, there are nonmarginal erosions measuring up to 6 mm in size, also sharply demarcated with sclerotic margins. Mild joint space loss of the left fifth metatarsophalangeal joint is demonstrated with tiny marginal osteophytes. Several discrete nonmarginal erosions are shown medially and laterally in the third metatarsal head with mild MTP joint space loss on the right. First through third tarsometatarsal joint space narrowing is present bilaterally with subcortical demineralization at the right third metatarsal base. Mild overlying dorsal soft tissue swelling is shown. The patient is status post resection of the left second metatarsal head. Extensive atherosclerotic calcifications are shown. Pelvic 1/07/2016: bilateral hip prostheses are partially visualized and in satisfactory alignment. There is no acute fracture. Lumbosacral fusion hardware is intact. 1/07/2016:    Lumbar 7/11/2014: recent laminectomy and instrumentation from , with placement of rods and pedicle screws. The hardware appears intact and hardware position is appropriate. Degenerative disc disease is present from L2-S1. There is grade 1 spondylolisthesis at L4-5 which is probably chronic. There is no evidence of vertebral compression fracture. CT Imaging    CTA chest with and without contrast 7/10/2014: no mediastinal, axillary or hilar lymphadenopathy. There is no pleural or pericardial effusion. The aorta is normal in course and caliber. The proximal pulmonary arteries are without evidence of filling defects, the caliber of the pulmonary arteries is also normal. The pulmonary parenchyma is unremarkable. No lytic or blastic lesions are identified. Subcutaneous edema in the soft tissues. Hepatic steatosis. No aortic aneurysm. No pulmonary embolism.  The remainder of the upper abdomen visualized is unremarkable    MR Imaging    None    DXA     DXA 6/25/2015: (Excluded sites: prosthetic hips, L3 and L4 for hardware and both hips for hardware) lumbar spine L1-L2 T score -0.2 (BMD 1.150 g/cm2). EMG/NCS    EMG/NCS 2/05/2019: Electrodiagnostic evidence for a moderate left carpal tunnel syndrome affecting motor and sensory fibers improved compared to the study in 2011. Electrodiagnostic evidence for some muscle membrane irritability in the right biceps which may represent some irritation of the right musculocutaneous nerve or it could be localized to the biceps muscle itself. Since the patient did not have findings in any other muscle group it is less likely that she has a myositis however if she has not had a sed rate checked or a CK checked recently I would suggest that this be considered. PROCEDURE     Kenalog 80 mg IM. (06/21/17)     ASSESSMENT AND PLAN    This is a follow-up visit for Ms. Johnson. 1) Seropositive Rheumatoid Arthritis. She has been off therapy for more than a year. I will resume methotrexate 20 mg every every Friday. Due to cost of rituximab (started in 8/2017), I will not resume it. She asked if she can resume Orencia although she had ineffectiveness to it, so I will do so. I gave her the patietn assistance forms to fill out. She is on prednisone 5 mg daily. Her CDAI was 24.5 (previously 37.5, N/A, 49) with 6 tender and 9 swollen joints, consistent with high disease activity. She is on prednisone 5 mg daily, which I will continue for now. Labs today. 2) Long Term Use of Systemic Steroids. She is on prednisone 5 mg daily. 3) Long Term Use of Immunosuppressants. The patient will resume immunomodulatory medications (methotrexate) and requires frequent toxicity monitoring by blood work. Respective labs were ordered (CBC and CMP). 4) Fibromyalgia. This was an active issue today, which is influencing her pain. I recommended Mart Chi and aqua therapy.    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  She is no longer on alendronate 70 mg weekly due unable to keeping up with taking her pills. She is amenable to infusions, so I will start Reclast. She takes 1000 mg of calcium daily and ergocalciferol. I asked her to schedule her DXA. She did not do it before due to depression.    6) Morbid Obesity. Her BMI was 36.39 (previously 43.77, 46.52, 46.69, 46.17,  45.32, 43.69, 44.46).    7) Vitamin D Deficiency. Her vitamin D was 27.9 (24.1). She is on ergocalciferol 50,000 weekly. I refilled it.      8) Diabetes Mellitus Type II. Her HbA1c was 5.9% (previously 7.3, 6.6%)     The patient voiced understanding of the aforementioned assessment and plan. Summary of plan was provided in the After Visit Summary patient instructions.      TODAY'S ORDERS    Orders Placed This Encounter    QUANTIFERON-TB GOLD PLUS    CBC WITH AUTOMATED DIFF    METABOLIC PANEL, COMPREHENSIVE    C REACTIVE PROTEIN, QT    SED RATE (ESR)    CHRONIC HEPATITIS PANEL    VITAMIN D, 25 HYDROXY    abatacept (ORENCIA) 125 mg/mL syrg    methotrexate (RHEUMATREX) 2.5 mg tablet    folic acid (FOLVITE) 1 mg tablet     Future Appointments   Date Time Provider Kenn Eller   11/27/2019 10:20 AM Sia King MD [place], MD, [address] [place] [address]    Adult Rheumatology   Musculoskeletal Ultrasound Certified  [address]   Phone [phone]  Fax [phone]

## 2019-08-28 NOTE — LETTER
8/28/19 Patient: Sampson Callas YOB: 1955 Date of Visit: 8/28/2019 Chloe Siddiqi MD 
[address] [place], [place] [id number] VIA In Basket Dear Chloe Siddiqi MD, Thank you for referring Ms. Dai Kendall to [address] [place] [address] for evaluation. My notes for this consultation are attached. If you have questions, please do not hesitate to call me. I look forward to following your patient along with you.  
 
 
Sincerely, 
 
Kenneth Monson MD

## 2019-08-28 NOTE — PROGRESS NOTES
Chief Complaint   Patient presents with    Arthritis     1. Have you been to the ER, urgent care clinic since your last visit? Hospitalized since your last visit? Yes [facility] for hip pain and hallucinations    2. Have you seen or consulted any other health care providers outside of the [address] [place] [address] since your last visit? Include any pap smears or colon screening.  Yes Orthopedic doc for hip pain

## 2019-09-02 LAB
25(OH)D3+25(OH)D2 SERPL-MCNC: 23.6 NG/ML (ref 30–100)
ALBUMIN SERPL-MCNC: 3.9 G/DL (ref 3.6–4.8)
ALBUMIN/GLOB SERPL: 1.3 {RATIO} (ref 1.2–2.2)
ALP SERPL-CCNC: 146 IU/L (ref 39–117)
ALT SERPL-CCNC: 21 IU/L (ref 0–32)
AST SERPL-CCNC: 12 IU/L (ref 0–40)
BASOPHILS # BLD AUTO: 0 X10E3/UL (ref 0–0.2)
BASOPHILS NFR BLD AUTO: 0 %
BILIRUB SERPL-MCNC: <0.2 MG/DL (ref 0–1.2)
BUN SERPL-MCNC: 21 MG/DL (ref 8–27)
BUN/CREAT SERPL: 38 (ref 12–28)
CALCIUM SERPL-MCNC: 9.3 MG/DL (ref 8.7–10.3)
CHLORIDE SERPL-SCNC: 103 MMOL/L (ref 96–106)
CO2 SERPL-SCNC: 24 MMOL/L (ref 20–29)
COMMENT, 144067: NORMAL
CREAT SERPL-MCNC: 0.55 MG/DL (ref 0.57–1)
CRP SERPL-MCNC: 3 MG/L (ref 0–10)
EOSINOPHIL # BLD AUTO: 0.3 X10E3/UL (ref 0–0.4)
EOSINOPHIL NFR BLD AUTO: 2 %
ERYTHROCYTE [DISTWIDTH] IN BLOOD BY AUTOMATED COUNT: 15.8 % (ref 12.3–15.4)
ERYTHROCYTE [SEDIMENTATION RATE] IN BLOOD BY WESTERGREN METHOD: 78 MM/HR (ref 0–40)
GAMMA INTERFERON BACKGROUND BLD IA-ACNC: 0.03 IU/ML
GLOBULIN SER CALC-MCNC: 3 G/DL (ref 1.5–4.5)
GLUCOSE SERPL-MCNC: 131 MG/DL (ref 65–99)
HBV CORE AB SERPL QL IA: NEGATIVE
HBV CORE IGM SERPL QL IA: NEGATIVE
HBV E AB SERPL QL IA: NEGATIVE
HBV E AG SERPL QL IA: NEGATIVE
HBV SURFACE AB SER QL: NON REACTIVE
HBV SURFACE AG SERPL QL IA: NEGATIVE
HCT VFR BLD AUTO: 35.1 % (ref 34–46.6)
HCV AB S/CO SERPL IA: <0.1 S/CO RATIO (ref 0–0.9)
HGB BLD-MCNC: 11.5 G/DL (ref 11.1–15.9)
IMM GRANULOCYTES # BLD AUTO: 0.2 X10E3/UL (ref 0–0.1)
IMM GRANULOCYTES NFR BLD AUTO: 1 %
LYMPHOCYTES # BLD AUTO: 2 X10E3/UL (ref 0.7–3.1)
LYMPHOCYTES NFR BLD AUTO: 14 %
M TB IFN-G BLD-IMP: NEGATIVE
M TB IFN-G CD4+ BCKGRND COR BLD-ACNC: 0.04 IU/ML
MCH RBC QN AUTO: 28.9 PG (ref 26.6–33)
MCHC RBC AUTO-ENTMCNC: 32.8 G/DL (ref 31.5–35.7)
MCV RBC AUTO: 88 FL (ref 79–97)
MITOGEN IGNF BLD-ACNC: 1.09 IU/ML
MONOCYTES # BLD AUTO: 0.8 X10E3/UL (ref 0.1–0.9)
MONOCYTES NFR BLD AUTO: 5 %
NEUTROPHILS # BLD AUTO: 11 X10E3/UL (ref 1.4–7)
NEUTROPHILS NFR BLD AUTO: 78 %
PLATELET # BLD AUTO: 426 X10E3/UL (ref 150–450)
POTASSIUM SERPL-SCNC: 4.4 MMOL/L (ref 3.5–5.2)
PROT SERPL-MCNC: 6.9 G/DL (ref 6–8.5)
QUANTIFERON INCUBATION, QF1T: NORMAL
QUANTIFERON TB2 AG: 0.03 IU/ML
RBC # BLD AUTO: 3.98 X10E6/UL (ref 3.77–5.28)
SERVICE CMNT-IMP: NORMAL
SODIUM SERPL-SCNC: 143 MMOL/L (ref 134–144)
WBC # BLD AUTO: 14.2 X10E3/UL (ref 3.4–10.8)

## 2019-09-03 DIAGNOSIS — E55.9 VITAMIN D DEFICIENCY: ICD-10-CM

## 2019-09-03 RX ORDER — ERGOCALCIFEROL 1.25 MG/1
50000 CAPSULE ORAL
Qty: 8 CAP | Refills: 0 | Status: SHIPPED | OUTPATIENT
Start: 2019-09-03 | End: 2021-05-17 | Stop reason: SDUPTHER

## 2019-09-03 RX ORDER — ERGOCALCIFEROL 1.25 MG/1
50000 CAPSULE ORAL
Qty: 12 CAP | Refills: 4 | Status: SHIPPED | OUTPATIENT
Start: 2019-09-03 | End: 2019-11-27

## 2019-09-07 DIAGNOSIS — E78.00 HYPERCHOLESTEROLEMIA: ICD-10-CM

## 2019-09-09 DIAGNOSIS — E11.9 TYPE 2 DIABETES MELLITUS WITHOUT COMPLICATION, WITHOUT LONG-TERM CURRENT USE OF INSULIN (HCC): ICD-10-CM

## 2019-09-09 NOTE — PROGRESS NOTES
[facility] at [address] [facility] Update    Date: 8/28/19    Justina Johnson 1955    Medication: Orencia syringes    Patient has a high co-pay on their prescription of $770.76 for a 1 month supply. Pharmacy staff notified the [facility] to discuss potentially obtaining the medication through the  directly or other treatment options, if appropriate, with the patient.     Law Valente, [address] [place] [address] Pharmacy at [facility],  Claire [place]ley   [address] [place] [address], [address] [place] [address] Avenue  phone: [phone]   fax: [phone]

## 2019-09-10 ENCOUNTER — DOCUMENTATION ONLY (OUTPATIENT)
Dept: PHARMACY | Age: 64
End: 2019-09-10

## 2019-09-10 RX ORDER — ATORVASTATIN CALCIUM 40 MG/1
TABLET, FILM COATED ORAL
Qty: 30 TAB | Refills: 0 | Status: SHIPPED | OUTPATIENT
Start: 2019-09-10 | End: 2022-06-30 | Stop reason: SDUPTHER

## 2019-09-10 RX ORDER — METFORMIN HYDROCHLORIDE 500 MG/1
500 TABLET ORAL 2 TIMES DAILY WITH MEALS
Qty: 180 TAB | Refills: 0 | Status: SHIPPED | OUTPATIENT
Start: 2019-09-10 | End: 2020-02-02

## 2019-09-10 NOTE — PROGRESS NOTES
[facility] at [address] [facility] Update    Date: 09/10/19    Imelda Johnson 1955    Medication: Orencia syringes    Patient has a high co-pay on their prescription of $770.76 for a 1 month supply. I called her today  and advised them to call the [facility] at [phone] to discuss potentially obtaining the medication through the  directly. She said it was \"approved already?!\" and found the copay to be too high. She will finish the Orencia paperwork that Dr. Quintero Primer and she already started and now that she has the copay amount she can fax it off when she finds a fax machine. She said she had just gotten home from the hospital (did not mention her reason for going to the hospital) and was trying to get all of her medications straight. She appreciated the call. I answered any questions that the patient had.     Evie Beal, [address] [facility] at [facility], [address]   [address]  phone: [phone]   fax: [phone]

## 2019-10-06 DIAGNOSIS — E03.9 ACQUIRED HYPOTHYROIDISM: ICD-10-CM

## 2019-10-07 RX ORDER — LEVOTHYROXINE SODIUM 100 UG/1
TABLET ORAL
Qty: 30 TAB | Refills: 0 | Status: SHIPPED | OUTPATIENT
Start: 2019-10-07 | End: 2019-12-17 | Stop reason: SDUPTHER

## 2019-11-27 ENCOUNTER — OFFICE VISIT (OUTPATIENT)
Dept: RHEUMATOLOGY | Age: 64
End: 2019-11-27

## 2019-11-27 VITALS
DIASTOLIC BLOOD PRESSURE: 85 MMHG | WEIGHT: 207.6 LBS | SYSTOLIC BLOOD PRESSURE: 142 MMHG | RESPIRATION RATE: 16 BRPM | TEMPERATURE: 98 F | OXYGEN SATURATION: 94 % | HEART RATE: 84 BPM | BODY MASS INDEX: 35.63 KG/M2

## 2019-11-27 DIAGNOSIS — Z79.52 LONG TERM (CURRENT) USE OF SYSTEMIC STEROIDS: ICD-10-CM

## 2019-11-27 DIAGNOSIS — M81.0 AGE-RELATED OSTEOPOROSIS WITHOUT CURRENT PATHOLOGICAL FRACTURE: ICD-10-CM

## 2019-11-27 DIAGNOSIS — E55.9 VITAMIN D DEFICIENCY: ICD-10-CM

## 2019-11-27 DIAGNOSIS — M05.79 SEROPOSITIVE RHEUMATOID ARTHRITIS OF MULTIPLE SITES (HCC): Primary | ICD-10-CM

## 2019-11-27 RX ORDER — FLUTICASONE PROPIONATE 50 MCG
SPRAY, SUSPENSION (ML) NASAL
Refills: 0 | COMMUNITY
Start: 2019-11-06 | End: 2021-07-01

## 2019-11-27 RX ORDER — CARVEDILOL 12.5 MG/1
25 TABLET ORAL
Refills: 1 | COMMUNITY
Start: 2019-10-10 | End: 2021-05-17 | Stop reason: SDUPTHER

## 2019-11-27 RX ORDER — MIRTAZAPINE 15 MG/1
TABLET, FILM COATED ORAL
Refills: 2 | COMMUNITY
Start: 2019-11-06

## 2019-11-27 NOTE — PATIENT INSTRUCTIONS
Please take 8 tablets of methotrexate every Saturday I will submit for Orencia infusions. They will call you to schedule your treatments.

## 2019-11-27 NOTE — LETTER
11/27/19 Patient: Liv Hubbard YOB: 1955 Date of Visit: 11/27/2019 Alyssa Chin MD 
[place] [address] VIA In Basket Dear Alyssa Chin MD, Thank you for referring Ms. Juliet Hernández to [address] [place] [address] for evaluation. My notes for this consultation are attached. If you have questions, please do not hesitate to call me. I look forward to following your patient along with you.  
 
 
Sincerely, 
 
Patricia Hurt MD

## 2019-11-27 NOTE — PROGRESS NOTES
REASON FOR VISIT    This is a follow-up visit for Ms. Johnson for     ICD-10-CM   1. Seropositive rheumatoid arthritis of multiple sites ([facility] M05.79     Inflammatory arthritis phenotype includes:  Anti-CCP positive: yes (73)  Rheumatoid factor positive: yes (86.2)  Erosive disease: no  Extra-articular manifestations include: none    Immunosuppression Screening (8/28/2019):   Quantiferon TB: negative  PPD:  Not performed  Hepatitis B: negative  Hepatitis C: negative    Therapy History includes:  Current DMARD therapy includes: methotrexate 20 mg every Saturday (8/28/2019 to present)  Prior DMARD therapy includes: Rituximab 1000 mg IV (8/07/2017-8/18/2017), Arava, Enbrel, Humira, Orencia, Actemra infusion (12/16/2016-7/2017)   The following DMARDs have been ineffective: methotrexate (6 months), Onencia SQ (4 months), Actemra   The following DMARDs were stopped because of side effects: Arava (hepatotoxicity), Humira (elbow infection from MRSA s/p 3 surgeries)     Osteoporosis Historical Synopsis     Height loss since age 25 (at least two inches): 2.5  Fracture history includes: no  Family history of hip fracture: no  Fall Risk: yes     Daily calcium intake is 1,000 mg  Daily vitamin D intake is 50,000 per week     Smoking history: yes (former smoker of 22 years)  Alcohol consumption: no  Prednisone history: yes (25 mg for more than several years)     Exercise: yes     Previous work-up for osteoporosis includes the following:  DEXA Scan: 2015  Vitamin 25OH D level: 23.6 (8/28/2019)  TSH: 9.360 (2/21/2019)  PTH: 16 (1/24/2018)     Therapy History includes:  Current osteoporosis therapy includes: none  Prior osteoporosis therapy includes: alendronate 70 mg weekly (1 year)  The following osteoporosis have been ineffective: none  The following osteoporosis were stopped because of side effects: none    Immunizations:   Immunization History   Administered Date(s) Administered    Influenza Vaccine 09/06/2016    Influenza Vaccine (Quad) 11/19/2015    Influenza Vaccine (Quad) PF 10/25/2017, 10/02/2018    Pneumococcal Polysaccharide (PPSV-23) 07/17/2017     Active problems include:    Patient Active Problem List   Diagnosis Code    Arthritis of knee M17.10    Lumbar stenosis with neurogenic claudication M48.062    Seropositive rheumatoid arthritis of multiple sites ([facility] M05.79    Easy bruising R23.8    Acquired hypothyroidism E03.9    Postmenopausal depression F32.89    Hypercholesterolemia E78.00    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 R61    Long term (current) use of systemic steroids Z79.52    Long term current use of immunosuppressive drug Z79.899    Essential hypertension I10    Controlled type 2 diabetes mellitus with complication, without long-term current use of insulin ([facility]) E11.8    Morbid obesity with BMI of 45.0-49.9, adult ([facility]) E66.01, Z68.42    Vitamin D deficiency E55.9    Osteopenia M85.80    Severe episode of recurrent major depressive disorder, without psychotic features ([facility] F33.2    Long-term use of immunosuppressant medication Z79.899    Type 2 diabetes with nephropathy ([facility] E11.21    Age-related osteoporosis without current pathological fracture M81.0     HISTORY OF PRESENT ILLNESS    Ms. Momo Mccullough returns for a follow-up visit. On her last visit, I resumed methotrexate 20 mg weekly Saturday, continued prednisone 5 mg and resumed Orencia per her request and gave her the PAP forms, which she submitted 2 weeks after her last visit and informs me that they did not receive her paperwork when she called 2 weeks ago. Her copay was $770.76 through her insurance. She is followed with Dr. Helen Hood due to right hip prosthesis pain who recommended revision of right hip arthroplasty. She has been taking Trexal 20 mg every Saturday.  Her pharmacy was prescribed Trexal 10 mg (2 tabs) instead of methotrexate 2.5 mg tablets    Her daughter said that she was a compulsive liar and left the examination room. She denies fever, weight loss, blurred vision, vision loss, oral ulcers, ankle swelling, dry cough, dyspnea, nausea, vomiting, dysphagia, abdominal pain, black or bloody stool, fall since last visit, rash, easy bruising and increased thirst.    Last toxicity monitoring by blood work was done on 8/28/2019 and did not reveal any significant adverse effects, except WBC 14.2,     Most recent inflammatory markers from 8/28/2019 revealed a ESR 78 mm/hr and CRP 3 mg/L. The patient has not had any interval hospital admissions, infections, or surgeries. REVIEW OF SYSTEMS    A comprehensive review of systems was performed and pertinent results are documented in the HPI, review of systems is otherwise non-contributory. PAST MEDICAL HISTORY    She has a past medical history of Anxiety, Arrhythmia, Arthritis, Arthritis of knee (1/30/2012), Autoimmune disease ([facility] Bronchitis, Chronic pain, Depression, Dyslipidemia, Morbid obesity ([facility] Nausea & vomiting, Other screening mammogram (8/19/15), Overweight(278.02), Papanicolaou smear for cervical cancer screening (8/14/15), Rheumatoid aortitis, and SVT (supraventricular tachycardia) ([facility] (1993). FAMILY HISTORY    Her family history includes Breast Cancer in an other family member; Diabetes in her brother and mother; Heart Disease in her brother and father. SOCIAL HISTORY    She reports that she quit smoking about 6 years ago. She has a 10.00 pack-year smoking history. She has never used smokeless tobacco. She reports that she does not drink alcohol or use drugs.     MEDICATIONS    Current Outpatient Medications   Medication Sig Dispense Refill    carvedilol (COREG) 12.5 mg tablet TK 1 T PO BID WF  1    fluticasone propionate (FLONASE) 50 mcg/actuation nasal spray SHAKE LQ AND U 1 SPR IEN QD FOR 10 DAYS UTD  0    mirtazapine (REMERON) 15 mg tablet TK 1 T PO QD BEFORE BEDTIME  2    methotrexate (TREXALL) 10 mg tablet Take 2 Tabs by mouth Every Saturday. 28 Tab 0    levothyroxine (SYNTHROID) 100 mcg tablet TAKE 1 TABLET BY MOUTH DAILY BEFORE BREAKFAST 30 Tab 0    atorvastatin (LIPITOR) 40 mg tablet TAKE 1 TABLET BY MOUTH DAILY 30 Tab 0    metFORMIN (GLUCOPHAGE) 500 mg tablet Take 1 Tab by mouth two (2) times daily (with meals). 180 Tab 0    ergocalciferol (ERGOCALCIFEROL) 50,000 unit capsule Take 1 Cap by mouth every seven (7) days. 8 Cap 0    cyanocobalamin (VITAMIN B-12) 500 mcg tablet Take 250 mcg by mouth daily.  folic acid (FOLVITE) 1 mg tablet TAKE 1 TABLET BY MOUTH DAILY 90 Tab 0    buPROPion XL (WELLBUTRIN XL) 300 mg XL tablet TAKE 1 TABLET BY MOUTH EVERY MORNING 30 Tab 1    gabapentin (NEURONTIN) 300 mg capsule TAKE 2 CAPSULES BY MOUTH THREE TIMES DAILY (Patient taking differently: 800 mg.) 180 Cap 0    lancets (FREESTYLE LANCETS) 28 gauge misc CHECK BLOOD SUGAR ONCE DAILY 100 Lancet 3    glucose blood VI test strips (FREESTYLE TEST) strip CHECK BLOOD SUGAR DAILY 100 Strip 0    HYDROcodone-acetaminophen (NORCO) 5-325 mg per tablet TK 1 T PO  UP TO tid PRN  0    Blood-Glucose Meter (BLOOD GLUCOSE MONITORING) monitoring kit E11.8  To check blood sugar daily 1 Kit 0    abatacept (ORENCIA) 125 mg/mL syrg 1 mL by SubCUTAneous route every seven (7) days. 4 Syringe 11        ALLERGIES    No Known Allergies    PHYSICAL EXAMINATION    Visit Vitals  /85 (BP 1 Location: Right arm, BP Patient Position: Sitting)   Pulse 84   Temp 98 °F (36.7 °C) (Oral)   Resp 16   Wt 207 lb 9.6 oz (94.2 kg)   SpO2 94%   BMI 35.63 kg/m²     Body mass index is 35.63 kg/m². General: Patient is alert, oriented x 3, not in acute distress     HEENT:   Sclerae are not injected and appear moist.  There is no alopecia. Neck is supple. Cardiovascular:  Heart is regular rate and rhythm, no murmurs. Chest:  Lungs are clear to auscultation bilaterally. No rhonchi, wheezes, or crackles. Abdomen:  Obese.     Extremities:  Free of clubbing, cyanosis, edema    Neurological exam:  Muscle strength is full in upper and lower extremities     Skin exam:  There are no alopecia, no discoid lesions, no active Raynaud's, no livedo reticularis, no periungual erythema. Multiple bruising    Musculoskeletal exam:  A comprehensive musculoskeletal exam was NOT performed for all joints of each upper and lower extremity and assessed for swelling, tenderness and range of motion.  Positive results are documented as below:    Previous Exam    Bilateral CMC crepitus  Left Elbow flexion contracture  Decreased of ROM of wrists  Bilateral ankle synovitis (left more than left)  Bilateral MTP tenderness    Joint Count 8/28/2019 1/24/2018 10/24/2017 6/21/2017 3/6/2017 1/17/2017 11/3/2016   Patient pain (0-100) 85 95 - 85 70 35 80   MHAQ 1.375 1.75 - 1.75 1.5 1.5 1.38   Left shoulder - Tender 1 1 - 1 - - -   Left shoulder - Swollen 0 - - - - - -   Left elbow - Tender 1 1 - 1 - - -   Left elbow - Swollen 1 1 - 1 - - -   Left wrist- Tender 1 1 1 - - - -   Left wrist- Swollen 1 1 1 1 - - -   Left 1st MCP - Tender 0 - 1 1 - - -   Left 1st MCP - Swollen 1 - 1 1 - - -   Left 2nd MCP - Tender 0 - 1 1 - - -   Left 2nd MCP - Swollen 1 - 1 1 - - -   Left 3rd MCP - Tender 0 - 1 1 - - -   Left 3rd MCP - Swollen - - 1 1 - - -   Left 4th MCP - Tender - - - 1 - - -   Left 4th MCP - Swollen - - - 1 - - -   Left 5th MCP - Tender - - - 1 - - -   Left 5th MCP - Swollen - - - - - - -   Left thumb IP - Tender - - - - - - -   Left thumb IP - Swollen - - - - - - -   Left 2nd PIP - Tender - - - 1 - - -   Left 2nd PIP - Swollen - - - 1 - - -   Left 3rd PIP - Tender - 1 1 1 - - -   Left 3rd PIP - Swollen - 1 1 1 - - -   Left 4th PIP - Tender - - 1 1 - - -   Left 4th PIP - Swollen - - - 1 - - -   Left 5th PIP - Tender - - - - - - -   Right shoulder - Tender 1 1 - 1 - - -   Right shoulder - Swollen 1 - - - - - -   Right elbow - Tender 0 - - 1 - - -   Right elbow - Swollen 1 1 - - - - -   Right wrist- Tender 1 1 1 1 - - -   Right wrist- Swollen 1 1 1 1 - - -   Right 1st MCP - Tender - 1 1 - - - -   Right 1st MCP - Swollen - - 1 - - - -   Right 2nd MCP - Tender - 1 1 1 - - -   Right 2nd MCP - Swollen - - 1 - - - -   Right 3rd MCP - Tender 0 1 1 1 - - -   Right 3rd MCP - Swollen 1 1 1 1 - - -   Right 4th MCP - Tender - 1 - 1 - - -   Right 4th MCP - Swollen - - - - - - -   Right 5th MCP - Tender - 1 - 1 - - -   Right 5th MCP - Swollen - - - - - - -   Right thumb IP - Tender - 1 1 - - - -   Right thumb IP - Swollen - - - - - - -   Right 2nd PIP - Tender - 1 1 1 - - -   Right 2nd PIP - Swollen - 1 1 1 - - -   Right 3rd PIP - Tender 1 1 1 1 - - -   Right 3rd PIP - Swollen 1 1 1 1 - - -   Right 4th PIP - Tender - 1 - 1 - - -   Right 4th PIP - Swollen - - - 1 - - -   Right 5th PIP - Tender - 1 - - - - -   Right 5th PIP - Swollen - - - - - - -   Tender Joint Count (Total) 6 16 13 20 - - -   Swollen Joint Count (Total) 9 8 11 14 - - -   Physician Assessment (0-10) 2 4 - 6 - - -   Patient Assessment (0-10) 7.5 9.5 - 9 7.5 6 7   CDAI Total (calculated) 24.5 37.5 - 49 - - -       DATA REVIEW    Laboratory     Recent laboratory results were reviewed, summarized, and discussed with the patient. Imaging    Musculoskeletal Ultrasound    None    Radiographs    Right Hip 6/17/2019: acute mildly displaced periprosthetic fracture of the right acetabulum. Postsurgical changes of bilateral total hip arthroplasty. Partially visualized postsurgical changes of lumbosacral posterior spinal fusion and decompression. Mild degenerative changes of the SI joints. Vascular calcifications. Cervical Spine 1/16/2019: The spinal alignment is normal. Loss of intervertebral disc height at C5-6. Facet arthropathy C5-6 C3-4 C4-5. Foraminal stenoses C3-4, C4-5, C5-6 and C6-7. Vertebral morphology is normal.  The intervertebral disc height is well-preserved. There are no identifiable paravertebral soft tissue abnormalities.  Prevertebral soft tissues unremarkable. Atlanto-dental interval within normal limits. No evidence of subluxation. Bilateral Shoulder 8/29/2018: RIGHT: moderate to severe glenohumeral joint space narrowing with spur formation. No evidence of fracture. The imaged right lung is clear. No soft tissue calcification. LEFT: severe narrowing of the glenohumeral joint space with spur formation. No evidence of fracture. Downward displacement of the humeral head suggestive of glenohumeral joint effusion. Included portions of the left lung are clear.     Bilateral Knee 1/24/2018: bilateral knee replacement which appear in anatomic alignment without evidence of loosening. There is no effusion. Chest 10/24/2017: a normal cardiomediastinal silhouette. The lungs are adequately expanded. There is no edema, effusion, consolidation, or pneumothorax. The osseous structures are unremarkable.     Chest 6/21/2017: Lungs: The lungs are clear of mass, nodule, airspace disease or edema. Pleura: There is no pleural effusion or pneumothorax. Mediastinum: The cardiac and mediastinal contours and pulmonary vascularity are normal.  The aorta is atherosclerotic. Bones and soft tissues: There are mild degenerative changes of the spine. Left Elbow 10/13/2016: mild to moderate diffuse joint space loss with moderate-sized marginal osteophytes. An elbow effusion is evident. Bones are moderately osteopenic. No acute fracture or dislocation is shown. Bilateral Hand 10/13/2016: moderate diffuse osteopenia. Bilateral carpal soft tissue swelling is shown which is greater on the right. Severe joint space loss is demonstrated in the radiocarpal joints bilaterally with scapholunate advanced collapse of the right. Right capitate abuts the distal radius. Incongruence on the left is shown between the scaphoid and lunate with mild interspace widening indicating early scapholunate advanced collapse. Small right bilateral distal radioulnar joint osteophytes are shown.  A 2 to 3 mm loose body is shown on the right. There is moderate bilateral first CMC osteoarthrosis with lateral subluxation and tiny marginal osteophytes. Mild to moderate uniform joint space narrowing is shown throughout the metacarpophalangeal joints with only minimal bilateral second and left third MCP joint osteophyte formation. Mild MCP level bilateral soft tissue swelling is present. Digital assessment shows mild joint space narrowing of the right second and third and the left third DIP joints. Tiny osteophytes are shown at these articulations as well as a small periosteal calcification along the medial aspect of the right ring finger middle phalangeal head. No definite erosions are identified nor is there soft tissue calcifications indicative of calcium pyrophosphate dihydrate deposition. Bilateral Foot 10/13/2016: chronic appearing fracture at the base of the right fifth metatarsal bone with fracture gap widening measuring up to 6 mm and mild adjacent soft tissue swelling. There is no other evidence for acute or chronic fracture or dislocation. Mild bilateral first metatarsophalangeal joint osteoarthrosis is demonstrated with tiny marginal osteophytes. Mild osseous hypertrophy of the medial first metatarsal heads is shown bilaterally with nonmarginal erosions with sclerotic margins and mild overlying soft tissue swelling. There is also mild valgus at posterior first MTP joints would lateral subluxation of the hallux sesamoid bones. Soft tissue swelling also overlies the fifth metatarsal heads bilaterally. On the right, there are nonmarginal erosions measuring up to 6 mm in size, also sharply demarcated with sclerotic margins. Mild joint space loss of the left fifth metatarsophalangeal joint is demonstrated with tiny marginal osteophytes. Several discrete nonmarginal erosions are shown medially and laterally in the third metatarsal head with mild MTP joint space loss on the right.  First through third tarsometatarsal joint space narrowing is present bilaterally with subcortical demineralization at the right third metatarsal base. Mild overlying dorsal soft tissue swelling is shown. The patient is status post resection of the left second metatarsal head. Extensive atherosclerotic calcifications are shown. Pelvic 1/07/2016: bilateral hip prostheses are partially visualized and in satisfactory alignment. There is no acute fracture. Lumbosacral fusion hardware is intact. 1/07/2016:    Lumbar 7/11/2014: recent laminectomy and instrumentation from , with placement of rods and pedicle screws. The hardware appears intact and hardware position is appropriate. Degenerative disc disease is present from L2-S1. There is grade 1 spondylolisthesis at L4-5 which is probably chronic. There is no evidence of vertebral compression fracture. CT Imaging    CTA chest with and without contrast 7/10/2014: no mediastinal, axillary or hilar lymphadenopathy. There is no pleural or pericardial effusion. The aorta is normal in course and caliber. The proximal pulmonary arteries are without evidence of filling defects, the caliber of the pulmonary arteries is also normal. The pulmonary parenchyma is unremarkable. No lytic or blastic lesions are identified. Subcutaneous edema in the soft tissues. Hepatic steatosis. No aortic aneurysm. No pulmonary embolism. The remainder of the upper abdomen visualized is unremarkable    MR Imaging    None    DXA     DXA 6/25/2015: (Excluded sites: prosthetic hips, L3 and L4 for hardware and both hips for hardware) lumbar spine L1-L2 T score -0.2 (BMD 1.150 g/cm2). EMG/NCS    EMG/NCS 2/05/2019: Electrodiagnostic evidence for a moderate left carpal tunnel syndrome affecting motor and sensory fibers improved compared to the study in 2011.  Electrodiagnostic evidence for some muscle membrane irritability in the right biceps which may represent some irritation of the right musculocutaneous nerve or it could be localized to the biceps muscle itself. Since the patient did not have findings in any other muscle group it is less likely that she has a myositis however if she has not had a sed rate checked or a CK checked recently I would suggest that this be considered. PROCEDURE     Kenalog 80 mg IM. (06/21/17)     ASSESSMENT AND PLAN    This is a follow-up visit for Ms. Johnson. 1) Seropositive Rheumatoid Arthritis. She is maintained methotrexate 20 mg every Saturday with good tolerance. She did not hear back from [facility] patient assistance. We gave her the forms again to submit. Her daughter reports that the patient is a compulsive liar about taking her medications. Her CDAI previously was 24.5 (previously 37.5, N/A, 49) with 6 tender and 9 swollen joints, consistent with high disease activity. She is no longer on prednisone 5 mg daily. I will submit for Orencia infusions in case she does not receive assistance. She is planning for right total hip revision next month and will not be ambulatory for more than 4 weeks, so I emphasized submitting her Orencia forms. Labs today. 2) Long Term Use of Systemic Steroids. She is no longer on prednisone 5 mg daily. 3) Long Term Use of Immunosuppressants. The patient will resume immunomodulatory medications (methotrexate) and requires frequent toxicity monitoring by blood work. Respective labs were ordered (CBC and CMP). 4) Fibromyalgia. This was not an active issue today. I had recommended Mart Chi and aqua therapy.    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 She is no longer on alendronate 70 mg weekly due unable to keeping up with taking her pills. She is amenable to infusions, so I will start Reclast. She takes 1000 mg of calcium daily and ergocalciferol. I had asked her to schedule her DXA. She did not do it before due to depression. I will readdress.    6) Morbid Obesity. Her BMI was 36.39 (previously 43.77, 46.52, 46.69, 46.17,  45.32, 43.69, 44.46).       7) Vitamin D Deficiency. Her vitamin D was 23.6 reviously 27.9, 24.1). She is on ergocalciferol 50,000 weekly. I will check her level today.     8) Diabetes Mellitus Type II. Her HbA1c was 5.9% (previously 7.3, 6.6%)     The patient voiced understanding of the aforementioned assessment and plan. Summary of plan was provided in the After Visit Summary patient instructions.      TODAY'S ORDERS    Orders Placed This Encounter    METHOTREXATE POLYGLUTAMATES    CBC WITH AUTOMATED DIFF    METABOLIC PANEL, COMPREHENSIVE    C REACTIVE PROTEIN, QT    SED RATE (ESR)    VITAMIN D, 25 HYDROXY    methotrexate (TREXALL) 10 mg tablet     Future Appointments   Date Time Provider Kenn Eller   3/3/2020 10:40 AM MD Gem Shen MD, [address] [place] [address]    Adult Rheumatology   Musculoskeletal Ultrasound Certified  [address] [place] [address]   Phone [phone]  Fax [phone]

## 2019-11-27 NOTE — PROGRESS NOTES
Chief Complaint   Patient presents with    Joint Pain     1. Have you been to the ER, urgent care clinic since your last visit? Hospitalized since your last visit? No    2. Have you seen or consulted any other health care providers outside of the [address] [place] [address] since your last visit? Include any pap smears or colon screening.  no

## 2019-12-10 LAB
25(OH)D3+25(OH)D2 SERPL-MCNC: 33.3 NG/ML (ref 30–100)
ALBUMIN SERPL-MCNC: 4.3 G/DL (ref 3.6–4.8)
ALBUMIN/GLOB SERPL: 1.3 {RATIO} (ref 1.2–2.2)
ALP SERPL-CCNC: 98 IU/L (ref 39–117)
ALT SERPL-CCNC: 18 IU/L (ref 0–32)
AST SERPL-CCNC: 17 IU/L (ref 0–40)
BASOPHILS # BLD AUTO: 0.1 X10E3/UL (ref 0–0.2)
BASOPHILS NFR BLD AUTO: 1 %
BILIRUB SERPL-MCNC: 0.3 MG/DL (ref 0–1.2)
BUN SERPL-MCNC: 17 MG/DL (ref 8–27)
BUN/CREAT SERPL: 20 (ref 12–28)
CALCIUM SERPL-MCNC: 10 MG/DL (ref 8.7–10.3)
CHLORIDE SERPL-SCNC: 101 MMOL/L (ref 96–106)
CO2 SERPL-SCNC: 20 MMOL/L (ref 20–29)
CREAT SERPL-MCNC: 0.83 MG/DL (ref 0.57–1)
CRP SERPL-MCNC: 2 MG/L (ref 0–10)
EOSINOPHIL # BLD AUTO: 0.4 X10E3/UL (ref 0–0.4)
EOSINOPHIL NFR BLD AUTO: 5 %
ERYTHROCYTE [DISTWIDTH] IN BLOOD BY AUTOMATED COUNT: 15.5 % (ref 12.3–15.4)
ERYTHROCYTE [SEDIMENTATION RATE] IN BLOOD BY WESTERGREN METHOD: 59 MM/HR (ref 0–40)
GLOBULIN SER CALC-MCNC: 3.2 G/DL (ref 1.5–4.5)
GLUCOSE SERPL-MCNC: 117 MG/DL (ref 65–99)
HCT VFR BLD AUTO: 39.6 % (ref 34–46.6)
HGB BLD-MCNC: 13.4 G/DL (ref 11.1–15.9)
IMM GRANULOCYTES # BLD AUTO: 0 X10E3/UL (ref 0–0.1)
IMM GRANULOCYTES NFR BLD AUTO: 0 %
LYMPHOCYTES # BLD AUTO: 2.7 X10E3/UL (ref 0.7–3.1)
LYMPHOCYTES NFR BLD AUTO: 40 %
MCH RBC QN AUTO: 30.7 PG (ref 26.6–33)
MCHC RBC AUTO-ENTMCNC: 33.8 G/DL (ref 31.5–35.7)
MCV RBC AUTO: 91 FL (ref 79–97)
METHOTREXATE PG1: 67.76 NMOL/L RBC
METHOTREXATE PG2: 31.29 NMOL/L RBC
METHOTREXATE PG3: 46.82 NMOL/L RBC
METHOTREXATE PG4: 21.84 NMOL/L RBC
METHOTREXATE PG5: 5.58 NMOL/L RBC
METHOTREXATE-PG TOTAL: 173.29 NMOL/L RBC
MONOCYTES # BLD AUTO: 0.5 X10E3/UL (ref 0.1–0.9)
MONOCYTES NFR BLD AUTO: 7 %
MTXPGSRA INTERPRETATION: NORMAL
NEUTROPHILS # BLD AUTO: 3.2 X10E3/UL (ref 1.4–7)
NEUTROPHILS NFR BLD AUTO: 47 %
PLATELET # BLD AUTO: 186 X10E3/UL (ref 150–450)
POTASSIUM SERPL-SCNC: 4.1 MMOL/L (ref 3.5–5.2)
PROT SERPL-MCNC: 7.5 G/DL (ref 6–8.5)
RBC # BLD AUTO: 4.37 X10E6/UL (ref 3.77–5.28)
SODIUM SERPL-SCNC: 139 MMOL/L (ref 134–144)
WBC # BLD AUTO: 6.9 X10E3/UL (ref 3.4–10.8)

## 2019-12-13 DIAGNOSIS — I10 ESSENTIAL HYPERTENSION: Primary | ICD-10-CM

## 2019-12-16 ENCOUNTER — TELEPHONE (OUTPATIENT)
Dept: FAMILY MEDICINE CLINIC | Age: 64
End: 2019-12-16

## 2019-12-16 NOTE — TELEPHONE ENCOUNTER
Spoke with pt and advised she is due for OV prior to any additional refills being approved. Pt verbalized understanding.  Scheduled for 12/17/19 @ 9:45 with NP.

## 2019-12-16 NOTE — TELEPHONE ENCOUNTER
----- Message from Jessica Pereira sent at 12/13/2019 11:56 AM EST -----  Regarding: FW: Dr. Jean-Paul Meng    ----- Message -----  From: Latrice Gomez  Sent: 12/13/2019  11:40 AM EST  To: [facility] Front Office  Subject: Dr. Bianca Raymundo contact number(s): [phone]      Name of medication and dosage if known:Levothyroxine 125mcg      Is patient out of this medication (yes/no):       Pharmacy name: [address] [place] [address] listed in chart? (yes/no):yes   Pharmacy phone number:      Details to clarify the request:      Dariana Lopez

## 2019-12-17 ENCOUNTER — OFFICE VISIT (OUTPATIENT)
Dept: FAMILY MEDICINE CLINIC | Age: 64
End: 2019-12-17

## 2019-12-17 ENCOUNTER — HOSPITAL ENCOUNTER (OUTPATIENT)
Dept: LAB | Age: 64
Discharge: HOME | End: 2019-12-17

## 2019-12-17 VITALS
HEART RATE: 79 BPM | BODY MASS INDEX: 35.63 KG/M2 | RESPIRATION RATE: 18 BRPM | DIASTOLIC BLOOD PRESSURE: 78 MMHG | OXYGEN SATURATION: 95 % | SYSTOLIC BLOOD PRESSURE: 118 MMHG | TEMPERATURE: 98.2 F | HEIGHT: 64 IN

## 2019-12-17 DIAGNOSIS — Z13.31 SCREENING FOR DEPRESSION: ICD-10-CM

## 2019-12-17 DIAGNOSIS — Z12.11 SCREEN FOR COLON CANCER: ICD-10-CM

## 2019-12-17 DIAGNOSIS — E78.00 HYPERCHOLESTEROLEMIA: ICD-10-CM

## 2019-12-17 DIAGNOSIS — E11.9 TYPE 2 DIABETES MELLITUS WITHOUT COMPLICATION, WITHOUT LONG-TERM CURRENT USE OF INSULIN (HCC): ICD-10-CM

## 2019-12-17 DIAGNOSIS — Z12.31 ENCOUNTER FOR SCREENING MAMMOGRAM FOR MALIGNANT NEOPLASM OF BREAST: ICD-10-CM

## 2019-12-17 DIAGNOSIS — E03.9 ACQUIRED HYPOTHYROIDISM: ICD-10-CM

## 2019-12-17 DIAGNOSIS — Z13.39 SCREENING FOR ALCOHOLISM: ICD-10-CM

## 2019-12-17 DIAGNOSIS — Z00.00 MEDICARE ANNUAL WELLNESS VISIT, SUBSEQUENT: Primary | ICD-10-CM

## 2019-12-17 DIAGNOSIS — Z78.0 POSTMENOPAUSAL STATE: ICD-10-CM

## 2019-12-17 DIAGNOSIS — G62.9 PERIPHERAL POLYNEUROPATHY: ICD-10-CM

## 2019-12-17 DIAGNOSIS — Z71.89 ADVANCED DIRECTIVES, COUNSELING/DISCUSSION: ICD-10-CM

## 2019-12-17 DIAGNOSIS — M05.79 SEROPOSITIVE RHEUMATOID ARTHRITIS OF MULTIPLE SITES (HCC): ICD-10-CM

## 2019-12-17 RX ORDER — GABAPENTIN 800 MG/1
TABLET ORAL 3 TIMES DAILY
COMMUNITY
End: 2019-12-17 | Stop reason: SDUPTHER

## 2019-12-17 RX ORDER — LEVOTHYROXINE SODIUM 125 UG/1
TABLET ORAL
COMMUNITY
End: 2019-12-17 | Stop reason: SDUPTHER

## 2019-12-17 RX ORDER — LEVOTHYROXINE SODIUM 100 UG/1
TABLET ORAL
Qty: 30 TAB | Refills: 0 | Status: SHIPPED | OUTPATIENT
Start: 2019-12-17 | End: 2019-12-17 | Stop reason: DRUGHIGH

## 2019-12-17 RX ORDER — LEVOTHYROXINE SODIUM 125 UG/1
125 TABLET ORAL
Qty: 30 TAB | Refills: 2 | Status: SHIPPED | OUTPATIENT
Start: 2019-12-17 | End: 2021-11-05 | Stop reason: SDUPTHER

## 2019-12-17 RX ORDER — GABAPENTIN 800 MG/1
800 TABLET ORAL 3 TIMES DAILY
Qty: 90 TAB | Refills: 2 | Status: SHIPPED | OUTPATIENT
Start: 2019-12-17 | End: 2020-05-14 | Stop reason: SDUPTHER

## 2019-12-17 RX ORDER — GABAPENTIN 300 MG/1
CAPSULE ORAL
Qty: 180 CAP | Refills: 0 | Status: SHIPPED | OUTPATIENT
Start: 2019-12-17 | End: 2019-12-17

## 2019-12-17 NOTE — PROGRESS NOTES
Mary Ellen Walsh is a 70 y.o. female    Chief Complaint   Patient presents with    Medication Refill    Labs     1. Have you been to the ER, urgent care clinic since your last visit? Hospitalized since your last visit? No    2. Have you seen or consulted any other health care providers outside of the [address] [place] [address] since your last visit? Include any pap smears or colon screening.  No

## 2019-12-17 NOTE — PROGRESS NOTES
This is the Subsequent Medicare Annual Wellness Exam, performed 12 months or more after the Initial AWV or the last Subsequent AWV    I have reviewed the patient's medical history in detail and updated the computerized patient record.      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    Long term (current) use of systemic steroids Z79.52    Long term current use of immunosuppressive drug Z79.899    Essential hypertension I10    Controlled type 2 diabetes mellitus with complication, without long-term current use of insulin ([facility]) E11.8    Morbid obesity with BMI of 45.0-49.9, adult ([facility]) E66.01, Z68.42    Vitamin D deficiency E55.9    Osteopenia M85.80    Severe episode of recurrent major depressive disorder, without psychotic features [facility] F33.2    Long-term use of immunosuppressant medication Z79.899    Type 2 diabetes with nephropathy ([facility] E11.21    Age-related osteoporosis without current pathological fracture M81.0     Past Medical History:   Diagnosis Date    Anxiety     Arrhythmia     Arthritis     RA    Arthritis of knee 1/30/2012    Autoimmune disease ([facility]     RA    Bronchitis     Chronic pain     due to RA    Depression     Dyslipidemia     Morbid obesity ([facility]     Nausea & vomiting     Other screening mammogram 8/19/15    Mammogram in 1 year    Overweight(278.02)     Papanicolaou smear for cervical cancer screening 8/14/15    neg; HPV neg    Rheumatoid aortitis     SVT (supraventricular tachycardia) ([facility] 1993    hx of. underwent cardiac ablation       Past Surgical History:   Procedure Laterality Date    BREAST SURGERY PROCEDURE UNLISTED      right breast mass excision    CARDIAC SURG PROCEDURE UNLIST  1993    cardiac ablation for SVT    HX GYN      BTL    HX HEENT  6-30-14    root canal    HX HIP REPLACEMENT      2006, 2009    HX KNEE REPLACEMENT      2012, Dr. Alejandra Tabares      2 rods and 4 bolts Dr. Cristel Andrade  7/2014    HX ORTHOPAEDIC  2005 2009    bilateral total hip replacement    HX ORTHOPAEDIC      left toe    HX ORTHOPAEDIC  2012    bilateral knee replacements    HX ORTHOPAEDIC  2013    left elbow x 5    HX TONSILLECTOMY       Current Outpatient Medications   Medication Sig Dispense Refill    gabapentin (NEURONTIN) 800 mg tablet Take 1 Tab by mouth three (3) times daily. Max Daily Amount: 2,400 mg. 90 Tab 2    levothyroxine (SYNTHROID) 125 mcg tablet Take 1 Tab by mouth Daily (before breakfast). 30 Tab 2    carvedilol (COREG) 12.5 mg tablet TK 1 T PO BID WF  1    fluticasone propionate (FLONASE) 50 mcg/actuation nasal spray SHAKE LQ AND U 1 SPR IEN QD FOR 10 DAYS UTD  0    mirtazapine (REMERON) 15 mg tablet TK 1 T PO QD BEFORE BEDTIME  2    methotrexate (TREXALL) 10 mg tablet Take 2 Tabs by mouth Every Saturday. 28 Tab 0    atorvastatin (LIPITOR) 40 mg tablet TAKE 1 TABLET BY MOUTH DAILY 30 Tab 0    metFORMIN (GLUCOPHAGE) 500 mg tablet Take 1 Tab by mouth two (2) times daily (with meals). 180 Tab 0    ergocalciferol (ERGOCALCIFEROL) 50,000 unit capsule Take 1 Cap by mouth every seven (7) days. 8 Cap 0    cyanocobalamin (VITAMIN B-12) 500 mcg tablet Take 250 mcg by mouth daily.  abatacept (ORENCIA) 125 mg/mL syrg 1 mL by SubCUTAneous route every seven (7) days.  4 Syringe 11    folic acid (FOLVITE) 1 mg tablet TAKE 1 TABLET BY MOUTH DAILY 90 Tab 0    buPROPion XL (WELLBUTRIN XL) 300 mg XL tablet TAKE 1 TABLET BY MOUTH EVERY MORNING 30 Tab 1    lancets (FREESTYLE LANCETS) 28 gauge misc CHECK BLOOD SUGAR ONCE DAILY 100 Lancet 3    glucose blood VI test strips (FREESTYLE TEST) strip CHECK BLOOD SUGAR DAILY 100 Strip 0    HYDROcodone-acetaminophen (NORCO) 5-325 mg per tablet TK 1 T PO  UP TO tid PRN  0    Blood-Glucose Meter (BLOOD GLUCOSE MONITORING) monitoring kit E11.8  To check blood sugar daily 1 Kit 0     No Known Allergies    Family History   Problem Relation Age of Onset    Diabetes Brother     Heart Disease Brother     Diabetes Mother     Heart Disease Father     Breast Cancer Other         Paternal Great Aunt     Social History     Tobacco Use    Smoking status: Former Smoker     Packs/day: 0.50     Years: 20.00     Pack years: 10.00     Last attempt to quit: 2013     Years since quittin.9    Smokeless tobacco: Never Used   Substance Use Topics    Alcohol use: No     Alcohol/week: 0.0 standard drinks       Depression Risk Factor Screening:     3 most recent PHQ Screens 2015   Little interest or pleasure in doing things Nearly every day   Feeling down, depressed, irritable, or hopeless Nearly every day   Total Score PHQ 2 6       Alcohol Risk Factor Screening:   Do you average 1 drink per night or more than 7 drinks a week:  No    On any one occasion in the past three months have you have had more than 3 drinks containing alcohol:  No      Functional Ability and Level of Safety:   Hearing: Hearing is good. Activities of Daily Living: The home contains: handrails  Patient needs help with:  transportation, shopping and housework    Ambulation: with difficulty, uses a wheelchair for longer distancces    Fall Risk:  Fall Risk Assessment, last 12 mths 2019   Able to walk? No   Fall in past 12 months?  No   Fall with injury? -   Number of falls in past 12 months -   Fall Risk Score -       Abuse Screen:  Patient is not abused    Cognitive Screening   Has your family/caregiver stated any concerns about your memory: no      Patient Care Team   Patient Care Team:  Yulisa Beckford MD as PCP - General (Family Practice)  Yulisa Beckford MD as PCP - [facility] Empaneled Provider  Jonas Nice Christina Keys MD as Physician (Obstetrics & Gynecology)  Dorothy White MD as Physician (Rheumatology)  Marsha Murphy DO as Surgeon (Orthopedic Surgery)    Assessment/Plan   Education and counseling provided:  Screening Mammography  Colorectal cancer screening tests  Bone mass measurement (DEXA)  She reports she has rec'd flu and pneumonia vaccines this season at her pharmacy- will confirm dates. Diagnoses and all orders for this visit:    1. Medicare annual wellness visit, subsequent    2. Advanced directives, counseling/discussion  See acp note    3. Screening for alcoholism  -     OR ANNUAL ALCOHOL SCREEN 15 MIN    4. Screening for depression  -     DEPRESSION SCREEN ANNUAL    5. Encounter for screening mammogram for malignant neoplasm of breast  -     [facility] MAMMO BI SCREENING INCL CAD; Future    6. Postmenopausal state  -     DEXA BONE DENSITY STUDY AXIAL; Future    7. Screen for colon cancer  -     OCCULT BLOOD IMMUNOASSAY,DIAGNOSTIC; Future      Health Maintenance Due   Topic Date Due    Shingrix Vaccine Age 49> (1 of 2) 12/29/2003    BREAST CANCER SCRN MAMMOGRAM  08/19/2016    FOBT Q 1 YEAR AGE 50-75  10/28/2018    Influenza Age 9 to Adult  08/01/2019    MICROALBUMIN Q1  10/02/2019     Follow up: Next AWV due in 12 months. I have discussed the diagnosis with the patient and the intended plan as seen in the above orders. The patient has received an after-visit summary and questions were answered concerning future plans. Patient conveyed understanding of the plan at the time of the visit.     Jessie Gutierrez NP

## 2019-12-17 NOTE — PATIENT INSTRUCTIONS
Medicare Wellness Visit, Female The best way to live healthy is to have a lifestyle where you eat a well-balanced diet, exercise regularly, limit alcohol use, and quit all forms of tobacco/nicotine, if applicable. Regular preventive services are another way to keep healthy. Preventive services (vaccines, screening tests, monitoring & exams) can help personalize your care plan, which helps you manage your own care. Screening tests can find health problems at the earliest stages, when they are easiest to treat. [facility] follows the current, evidence-based guidelines published by the [facility] Andrea Vidales ([facility]) when recommending preventive services for our patients. Because we follow these guidelines, sometimes recommendations change over time as research supports it. (For example, mammograms used to be recommended annually. Even though Medicare will still pay for an annual mammogram, the newer guidelines recommend a mammogram every two years for women of average risk). Of course, you and your doctor may decide to screen more often for some diseases, based on your risk and your co-morbidities (chronic disease you are already diagnosed with). Preventive services for you include: - Medicare offers their members a free annual wellness visit, which is time for you and your primary care provider to discuss and plan for your preventive service needs. Take advantage of this benefit every year! 
-All adults over the age of 72 should receive the recommended pneumonia vaccines. Current USPSTF guidelines recommend a series of two vaccines for the best pneumonia protection.  
-All adults should have a flu vaccine yearly and a tetanus vaccine every 10 years.  
-All adults age 48 and older should receive the shingles vaccines (series of two vaccines). -All adults age 38-68 who are overweight should have a diabetes screening test once every three years. -All adults born between [birth year] and 1967 should be screened once for Hepatitis C. 
-Other screening tests and preventive services for persons with diabetes include: an eye exam to screen for diabetic retinopathy, a kidney function test, a foot exam, and stricter control over your cholesterol.  
-Cardiovascular screening for adults with routine risk involves an electrocardiogram (ECG) at intervals determined by your doctor.  
-Colorectal cancer screenings should be done for adults age 54-65 with no increased risk factors for colorectal cancer. There are a number of acceptable methods of screening for this type of cancer. Each test has its own benefits and drawbacks. Discuss with your doctor what is most appropriate for you during your annual wellness visit. The different tests include: colonoscopy (considered the best screening method), a fecal occult blood test, a fecal DNA test, and sigmoidoscopy. 
 
-A bone mass density test is recommended when a woman turns 65 to screen for osteoporosis. This test is only recommended one time, as a screening. Some providers will use this same test as a disease monitoring tool if you already have osteoporosis. -Breast cancer screenings are recommended every other year for women of normal risk, age 54-69. 
-Cervical cancer screenings for women over age 72 are only recommended with certain risk factors. Here is a list of your current Health Maintenance items (your personalized list of preventive services) with a due date: 
Health Maintenance Due Topic Date Due  Shingles Vaccine (1 of 2) 12/29/2003  Mammogram  08/19/2016  Diabetic Foot Care  06/12/2018 [address] [place] [address] Annual Well Visit  07/18/2018  Stool testing for trace blood  10/28/2018  Flu Vaccine  08/01/2019  Hemoglobin A1C    08/21/2019  Albumin Urine Test  10/02/2019 Medicare Wellness Visit, Female The best way to live healthy is to have a lifestyle where you eat a well-balanced diet, exercise regularly, limit alcohol use, and quit all forms of tobacco/nicotine, if applicable. Regular preventive services are another way to keep healthy. Preventive services (vaccines, screening tests, monitoring & exams) can help personalize your care plan, which helps you manage your own care. Screening tests can find health problems at the earliest stages, when they are easiest to treat. [facility] follows the current, evidence-based guidelines published by the [facility] Andrea Flash ([facility]) when recommending preventive services for our patients. Because we follow these guidelines, sometimes recommendations change over time as research supports it. (For example, mammograms used to be recommended annually. Even though Medicare will still pay for an annual mammogram, the newer guidelines recommend a mammogram every two years for women of average risk). Of course, you and your doctor may decide to screen more often for some diseases, based on your risk and your co-morbidities (chronic disease you are already diagnosed with). Preventive services for you include: - Medicare offers their members a free annual wellness visit, which is time for you and your primary care provider to discuss and plan for your preventive service needs. Take advantage of this benefit every year! 
-All adults over the age of 72 should receive the recommended pneumonia vaccines. Current USPSTF guidelines recommend a series of two vaccines for the best pneumonia protection.  
-All adults should have a flu vaccine yearly and a tetanus vaccine every 10 years.  
-All adults age 48 and older should receive the shingles vaccines (series of two vaccines).      
-All adults age 38-68 who are overweight should have a diabetes screening test once every three years.  
-All adults born between 80 and 1965 should be screened once for Hepatitis C. 
 -Other screening tests and preventive services for persons with diabetes include: an eye exam to screen for diabetic retinopathy, a kidney function test, a foot exam, and stricter control over your cholesterol.  
-Cardiovascular screening for adults with routine risk involves an electrocardiogram (ECG) at intervals determined by your doctor.  
-Colorectal cancer screenings should be done for adults age 54-65 with no increased risk factors for colorectal cancer. There are a number of acceptable methods of screening for this type of cancer. Each test has its own benefits and drawbacks. Discuss with your doctor what is most appropriate for you during your annual wellness visit. The different tests include: colonoscopy (considered the best screening method), a fecal occult blood test, a fecal DNA test, and sigmoidoscopy. 
 
-A bone mass density test is recommended when a woman turns 65 to screen for osteoporosis. This test is only recommended one time, as a screening. Some providers will use this same test as a disease monitoring tool if you already have osteoporosis. -Breast cancer screenings are recommended every other year for women of normal risk, age 54-69. 
-Cervical cancer screenings for women over age 72 are only recommended with certain risk factors. Here is a list of your current Health Maintenance items (your personalized list of preventive services) with a due date: 
Health Maintenance Due Topic Date Due  Shingles Vaccine (1 of 2) 12/29/2003  Mammogram  08/19/2016  Diabetic Foot Care  06/12/2018 [employer] Annual Well Visit  07/18/2018  Stool testing for trace blood  10/28/2018  Flu Vaccine  08/01/2019  Hemoglobin A1C    08/21/2019  Albumin Urine Test  10/02/2019

## 2019-12-17 NOTE — ACP (ADVANCE CARE PLANNING)
Advance Care Planning    Advance Care Planning (ACP) Provider Conversation Snapshot    Date of ACP Conversation: 12/17/19  Persons included in Conversation:  patient  Length of ACP Conversation in minutes:  <16 minutes (Non-Billable)    Authorized Decision Maker (if patient is incapable of making informed decisions): This person is:   Healthcare Agent/Medical Power of  under Advance Directive            For Patients with Decision Making Capacity:   Values/Goals: Exploration of values, goals, and preferences if recovery is not expected, even with continued medical treatment in the event of:  Imminent death  Severe, permanent brain injury  \"In these circumstances, what matters most to you? \"  Care focused more on comfort or quality of life. Care focused more on quantity (length) of life. Conversation Outcomes / Follow-Up Plan:   Reviewed existing Advance Directive   Recommended communicating the plan and making copies for the healthcare agent, personal physician, and others as appropriate (e.g., health system)  Recommended review of completed ACP document annually or upon change in health status     Advanced directive on file reflects her current wishes. Repeat review in 12 years.     Latricia Wynn NP

## 2019-12-19 NOTE — PROGRESS NOTES
Subjective:     Carly Dennis is a 70 y.o. female seen for follow up of diabetes, polyneuropathy, hyperlipidemia, hypertension, hypothyroid, osteoarthritis, and RA. She is awaiting cardiac evaluation to obtain clearance for planned right hip surgery. Diabetic Review of Systems - medication compliance: compliant all of the time, diabetic diet compliance: compliant most of the time, home glucose monitoring: is performed sporadically, Patient did not bring glucose log to this visit. , further diabetic ROS: no polyuria or polydipsia, no chest pain, dyspnea or TIA's, no numbness, tingling or pain in extremities, no unusual visual symptoms, no hypoglycemia, no medication side effects noted, last eye exam approximately 18 months ago, acute symptoms are none. Cardiovascular risk analysis - LDL goal is under 80  diabetic  low HDL  smoker  obese  sedentary lifestyle. Compliance with treatment thus far has been good. ROS: taking medications as instructed, no medication side effects noted. Diet and Lifestyle: generally follows a low fat low cholesterol diet, generally follows a low sodium diet, follows a diabetic diet regularly, sedentary, nonsmoker  Home BP Monitoring: is not measured at home    Cardiovascular ROS: taking medications as instructed, no medication side effects noted, no TIA's, no chest pain on exertion, no dyspnea on exertion, no swelling of ankles, no orthostatic dizziness or lightheadedness, no orthopnea or paroxysmal nocturnal dyspnea, no palpitations, no intermittent claudication symptoms. RA managed by Dr. Sera Harley. She reports significant pain and joint deformities. Currently on methotrexate, reports difficulty getting her insurance to cover other DMARDs. Other symptoms and concerns: reports levothyroxine dose was increased by another clinician. Due for TSH. Reports gabapentin dose increased to 800 mg TID (previously 600 mg TID) by another clinician (at [facility] clinic?).  She takes this for neuropathy/chronic pain.      Patient Active Problem List   Diagnosis Code    Arthritis of knee M17.10    Lumbar stenosis with neurogenic claudication M48.062    Seropositive rheumatoid arthritis of multiple sites ([facility] M05.79    Easy bruising R23.8    Acquired hypothyroidism E03.9    Postmenopausal depression F32.89    Hypercholesterolemia E78.00    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 R61    Long term (current) use of systemic steroids Z79.52    Long term current use of immunosuppressive drug Z79.899    Essential hypertension I10    Controlled type 2 diabetes mellitus with complication, without long-term current use of insulin ([facility]) E11.8    Morbid obesity with BMI of 45.0-49.9, adult ([facility]) E66.01, Z68.42    Vitamin D deficiency E55.9    Osteopenia M85.80    Severe episode of recurrent major depressive disorder, without psychotic features ([facility] F33.2    Long-term use of immunosuppressant medication Z79.899    Type 2 diabetes with nephropathy ([facility]) E11.21    Age-related osteoporosis without current pathological fracture M81.0     No Known Allergies  Past Medical History:   Diagnosis Date    Anxiety     Arrhythmia     Arthritis     RA    Arthritis of knee 1/30/2012    Autoimmune disease ([facility]     RA    Bronchitis     Chronic pain     due to RA    Depression     Dyslipidemia     Morbid obesity ([facility])     Nausea & vomiting     Other screening mammogram 8/19/15    Mammogram in 1 year    Overweight(278.02)     Papanicolaou smear for cervical cancer screening 8/14/15    neg; HPV neg    Rheumatoid aortitis     [facility]    hx of. underwent cardiac ablation      Past Surgical History:   Procedure Laterality Date    BREAST SURGERY PROCEDURE UNLISTED      right breast mass excision    CARDIAC SURG PROCEDURE UNLIST  1993    cardiac ablation for SVT    HX GYN      BTL    HX HEENT  6-30-14    root canal    HX HIP REPLACEMENT 2006,     HX KNEE REPLACEMENT      , Dr. Dru Dennis      2 rods and 4 bolts Dr. Rayray Townsend  2014    HX ORTHOPAEDIC  2005    bilateral total hip replacement    HX ORTHOPAEDIC      left toe    HX ORTHOPAEDIC      bilateral knee replacements    HX ORTHOPAEDIC  2013    left elbow x 5    HX TONSILLECTOMY       Family History   Problem Relation Age of Onset    Diabetes Brother     Heart Disease Brother     Diabetes Mother     Heart Disease Father     Breast Cancer Other         Paternal Great Aunt     Social History     Tobacco Use    Smoking status: Former Smoker     Packs/day: 0.50     Years: 20.00     Pack years: 10.00     Last attempt to quit: 2013     Years since quittin.9    Smokeless tobacco: Never Used   Substance Use Topics    Alcohol use: No     Alcohol/week: 0.0 standard drinks        Lab Results   Component Value Date/Time    WBC 6.9 2019 11:03 AM    HGB 13.4 2019 11:03 AM    HCT 39.6 2019 11:03 AM    PLATELET 292  11:03 AM    MCV 91 2019 11:03 AM     Lab Results   Component Value Date/Time    Hemoglobin A1c 5.9 (H) 2019 03:32 PM    Hemoglobin A1c 7.3 (H) 2018 12:17 PM    Hemoglobin A1c 8.1 (H) 10/25/2017 03:40 PM    Glucose 117 (H) 2019 11:03 AM    Glucose  2016 11:39 AM    Microalb/Creat ratio (ug/mg creat.) 5.1 10/02/2018 11:43 AM    LDL, calculated 69 2019 03:32 PM    Creatinine 0.83 2019 11:03 AM      Lab Results   Component Value Date/Time    Cholesterol, total 154 2019 03:32 PM    HDL Cholesterol 66 2019 03:32 PM    LDL, calculated 69 2019 03:32 PM    Triglyceride 93 2019 03:32 PM     Lab Results   Component Value Date/Time    ALT (SGPT) 18 2019 11:03 AM    AST (SGOT) 17 2019 11:03 AM    Alk.  phosphatase 98 2019 11:03 AM    Bilirubin, total 0.3 2019 11:03 AM    Albumin 4.3 2019 11:03 AM    Protein, total 7.5 2019 11:03 AM    INR 1.0 06/30/2014 02:45 PM    Prothrombin time 10.0 06/30/2014 02:45 PM    PLATELET 589 83/95/9445 11:03 AM     Lab Results   Component Value Date/Time    GFR est non-AA 74 11/27/2019 11:03 AM    GFR est AA 86 11/27/2019 11:03 AM    Creatinine 0.83 11/27/2019 11:03 AM    BUN 17 11/27/2019 11:03 AM    Sodium 139 11/27/2019 11:03 AM    Potassium 4.1 11/27/2019 11:03 AM    Chloride 101 11/27/2019 11:03 AM    CO2 20 11/27/2019 11:03 AM    Magnesium 2.0 02/03/2012 03:20 AM    PTH, Intact 16 01/24/2018 02:12 PM     Lab Results   Component Value Date/Time    TSH 9.360 (H) 02/21/2019 03:32 PM         Review of Systems  A comprehensive review of systems was negative except for that written in the HPI. Objective:     Visit Vitals  /78 (BP 1 Location: Right arm, BP Patient Position: Sitting)   Pulse 79   Temp 98.2 °F (36.8 °C) (Oral)   Resp 18   Ht 5' 4\" (1.626 m)   SpO2 95%   BMI 35.63 kg/m²     Physical Examination: General appearance - alert, well appearing, and in no distress, oriented to person, place, and time and well hydrated  Mental status - normal mood, behavior, speech, dress, motor activity, and thought processes  Eyes - sclera anicteric  Neck - supple, no significant adenopathy, thyroid exam: thyroid is normal in size without nodules or tenderness  Chest - clear to auscultation, no wheezes, rales or rhonchi, symmetric air entry  Heart - normal rate, regular rhythm, normal S1, S2, no murmurs, rubs, clicks or gallops, normal bilateral carotid upstroke without bruits, no JVD  Abdomen - soft, nontender, nondistended, no masses or organomegaly  bowel sounds normal  Neurological - alert, oriented, normal speech, no focal findings or movement disorder noted  Musculoskeletal - significant deformity and tenderness of joints of bilateral hands, right foot.   Extremities - pedal edema trace, intact peripheral pulses  Skin - normal coloration and turgor, no rashes, no suspicious skin lesions noted    Diabetic foot exam:     Left Foot:   Visual Exam: callus, thickened, long nails   Pulse DP: 2+ (normal)   Filament test: reduced sensation        Right Foot:   Visual Exam: callus, thickened, long nails   Pulse DP: 2+ (normal)   Filament test: reduced sensation      Assessment/Plan:     Diabetic issues reviewed with her: low cholesterol diet, weight control and daily exercise discussed, home glucose monitoring emphasized, all medications, side effects and compliance discussed carefully, foot care discussed and Podiatry visits discussed, annual eye examinations at Ophthalmology discussed, glycohemoglobin and other lab monitoring discussed and long term diabetic complications discussed. Diagnoses and all orders for this visit:    1. Acquired hypothyroidism  -     levothyroxine (SYNTHROID) 125 mcg tablet; Take 1 Tab by mouth Daily (before breakfast). 2. Seropositive rheumatoid arthritis of multiple sites ([facility]  Management per RA    3. Type 2 diabetes mellitus without complication, without long-term current use of insulin ([facility]  Overdue for a1c  Reviewed diet recommendations  Foot exam completed today, referred to podiatry for diabetic foot care. She will make appt with podiatrist at [facility]  microalbumin today  -      DIABETES FOOT EXAM  -     MICROALBUMIN, UR, RAND W/ MICROALB/CREAT RATIO; Future  -     REFERRAL TO PODIATRY    4. Hypercholesterolemia  At goal  TLC Diet:   Saturated fat <7% of calories, cholesterol <200 mg/day   Consider increased viscous (soluble) fiber (10-25 g/day) and plant stanols/sterols  (2g/day) as therapeutic options to enhance LDL lowering  Weight management  Increased physical activity. Continue atorvastatin    5. Peripheral polyneuropathy  Continue rx  -     gabapentin (NEURONTIN) 800 mg tablet; Take 1 Tab by mouth three (3) times daily. Max Daily Amount: 2,400 mg.       Follow-up and Dispositions    · Return in about 3 months (around 3/17/2020), or if symptoms worsen or fail to improve. I have discussed the diagnosis with the patient and the intended plan as seen in the above orders. The patient has received an after-visit summary and questions were answered concerning future plans. Patient conveyed understanding of the plan at the time of the visit.     Vina Gottron, NP

## 2020-03-10 DIAGNOSIS — E11.9 TYPE 2 DIABETES MELLITUS WITHOUT COMPLICATION, WITHOUT LONG-TERM CURRENT USE OF INSULIN (HCC): ICD-10-CM

## 2020-03-12 RX ORDER — METFORMIN HYDROCHLORIDE 500 MG/1
TABLET ORAL
Qty: 60 TAB | Refills: 0 | Status: SHIPPED | OUTPATIENT
Start: 2020-03-12 | End: 2022-06-30 | Stop reason: SDUPTHER

## 2020-03-25 ENCOUNTER — TELEPHONE (OUTPATIENT)
Dept: FAMILY MEDICINE CLINIC | Age: 65
End: 2020-03-25

## 2020-03-25 DIAGNOSIS — R82.90 MALODOROUS URINE: Primary | ICD-10-CM

## 2020-03-25 DIAGNOSIS — R30.0 DYSURIA: ICD-10-CM

## 2020-03-25 NOTE — TELEPHONE ENCOUNTER
Home health is out with the patient right now; [facility] checking on the hip replacement; skilled nurse services and therapy; the patient has a UTI; burning pain and odor; home health was asking for something to be called in for the patient; Pharmacy:  [facility].     Best contact number for home health:  [address] - home health nurse    Patient contact number:  [phone]    Thank you

## 2020-03-26 RX ORDER — NITROFURANTOIN (MACROCRYSTALS) 100 MG/1
100 CAPSULE ORAL 2 TIMES DAILY
Qty: 20 CAP | Refills: 0 | Status: SHIPPED | OUTPATIENT
Start: 2020-03-26 | End: 2020-04-05

## 2020-03-26 NOTE — TELEPHONE ENCOUNTER
Sent macrobid   the methotrexate interacts w bactrim and amox  She will need to call in for a VV if the symptoms persist

## 2020-03-26 NOTE — TELEPHONE ENCOUNTER
Two patient Identification confirmed. I spoke with the patient about Sent macrobid   the methotrexate interacts w bactrim and amox  She will need to call in for a VV if the symptoms persist.    Patients states spoke with other surgical doctor about symptoms as well. Patients states doctor sending in rx for UTI symptoms. Patient informed to reach out to surgical doctor to inform Dr. Dorris Fabry also rx medication. Patient instructed not to take both medications. Patient verbalized understanding.

## 2020-05-11 DIAGNOSIS — G62.9 PERIPHERAL POLYNEUROPATHY: ICD-10-CM

## 2020-05-11 NOTE — TELEPHONE ENCOUNTER
----- Message from [employer] sent at 2020 12:42 PM EDT -----  Regarding: Dr. Arnett [phone]  Name of medication and dosage if known:  Gabapentin- 800mg  Is patient out of this medication (yes/no): no, one week left    Ph[facility] name: [facility] listed in chart? (yes/no): Yes  Pharmacy phone Number: n/a    Date of last visit:  2019 09:45 AM

## 2020-05-14 RX ORDER — GABAPENTIN 800 MG/1
800 TABLET ORAL 3 TIMES DAILY
Qty: 90 TAB | Refills: 1 | Status: SHIPPED | OUTPATIENT
Start: 2020-05-14 | End: 2020-09-02 | Stop reason: SDUPTHER

## 2020-05-15 ENCOUNTER — DOCUMENTATION ONLY (OUTPATIENT)
Dept: FAMILY MEDICINE CLINIC | Age: 65
End: 2020-05-15

## 2020-06-09 ENCOUNTER — TELEPHONE (OUTPATIENT)
Dept: FAMILY MEDICINE CLINIC | Age: 65
End: 2020-06-09

## 2020-06-09 NOTE — TELEPHONE ENCOUNTER
[facility] called stating pt is on HTN medication and BP is currently 92/56. She says she feels light-headed and nauseous.  Transferred to [address] [place] [address]

## 2020-06-22 ENCOUNTER — VIRTUAL VISIT (OUTPATIENT)
Dept: RHEUMATOLOGY | Age: 65
End: 2020-06-22

## 2020-06-22 DIAGNOSIS — Z91.199 NO-SHOW FOR APPOINTMENT: Primary | ICD-10-CM

## 2020-06-22 NOTE — PROGRESS NOTES
PATIENT DID NOT CONNECT FOR VIRTUAL APPOINTMENT. MULTIPLE PHONE CALLS WERE MADE WITH DIRECT ROUTING TO ANSWERING MACHINE. PHONE RINGING ON TWO SEPARATE CALLS WITH VOICE MAIL LEFT.

## 2020-08-06 ENCOUNTER — TELEPHONE (OUTPATIENT)
Dept: FAMILY MEDICINE CLINIC | Age: 65
End: 2020-08-06

## 2020-08-06 NOTE — TELEPHONE ENCOUNTER
Left message on vm to call office back. 1st attempt. LM to call office to schedule Diabetic exam for home blood glucose testing authorization form.   Patient need recent diabetic exam in order to complete request.

## 2020-08-14 ENCOUNTER — DOCUMENTATION ONLY (OUTPATIENT)
Dept: FAMILY MEDICINE CLINIC | Age: 65
End: 2020-08-14

## 2020-09-01 ENCOUNTER — VIRTUAL VISIT (OUTPATIENT)
Dept: FAMILY MEDICINE CLINIC | Age: 65
End: 2020-09-01
Payer: MEDICARE

## 2020-09-01 DIAGNOSIS — Z12.11 SCREEN FOR COLON CANCER: ICD-10-CM

## 2020-09-01 DIAGNOSIS — E11.9 TYPE 2 DIABETES MELLITUS WITHOUT COMPLICATION, WITHOUT LONG-TERM CURRENT USE OF INSULIN (HCC): Primary | ICD-10-CM

## 2020-09-01 DIAGNOSIS — E78.00 HYPERCHOLESTEROLEMIA: ICD-10-CM

## 2020-09-01 PROCEDURE — G9717 DOC PT DX DEP/BP F/U NT REQ: HCPCS | Performed by: FAMILY MEDICINE

## 2020-09-01 PROCEDURE — G8428 CUR MEDS NOT DOCUMENT: HCPCS | Performed by: FAMILY MEDICINE

## 2020-09-01 PROCEDURE — G8756 NO BP MEASURE DOC: HCPCS | Performed by: FAMILY MEDICINE

## 2020-09-01 PROCEDURE — 2022F DILAT RTA XM EVC RTNOPTHY: CPT | Performed by: FAMILY MEDICINE

## 2020-09-01 PROCEDURE — G8419 CALC BMI OUT NRM PARAM NOF/U: HCPCS | Performed by: FAMILY MEDICINE

## 2020-09-01 PROCEDURE — 1090F PRES/ABSN URINE INCON ASSESS: CPT | Performed by: FAMILY MEDICINE

## 2020-09-01 PROCEDURE — G8536 NO DOC ELDER MAL SCRN: HCPCS | Performed by: FAMILY MEDICINE

## 2020-09-01 PROCEDURE — 1101F PT FALLS ASSESS-DOCD LE1/YR: CPT | Performed by: FAMILY MEDICINE

## 2020-09-01 PROCEDURE — 3046F HEMOGLOBIN A1C LEVEL >9.0%: CPT | Performed by: FAMILY MEDICINE

## 2020-09-01 PROCEDURE — 3017F COLORECTAL CA SCREEN DOC REV: CPT | Performed by: FAMILY MEDICINE

## 2020-09-01 PROCEDURE — G9899 SCRN MAM PERF RSLTS DOC: HCPCS | Performed by: FAMILY MEDICINE

## 2020-09-01 PROCEDURE — 99213 OFFICE O/P EST LOW 20 MIN: CPT | Performed by: FAMILY MEDICINE

## 2020-09-01 NOTE — PROGRESS NOTES
Jelena Ashton is a 75 y.o. female who was seen by synchronous (real-time) audio-video technology on 9/1/2020 for No chief complaint on file. She says she had surgery on the hip and since then she has not been able to walk. She had the hip and pelvis rebuilt  She had the surgery in feb  She can now walk with a walker but cannot get down her front steps    DM  Blood sugars are controlled under 100    Assessment & Plan:   Diagnoses and all orders for this visit:    1. Type 2 diabetes mellitus without complication, without long-term current use of insulin (HCC)  -     METABOLIC PANEL, COMPREHENSIVE; Future  -     VITAMIN D, 25 HYDROXY; Future  -     TSH 3RD GENERATION; Future  -     HEMOGLOBIN A1C WITH EAG; Future  -     MICROALBUMIN, UR, RAND W/ MICROALB/CREAT RATIO; Future  -     LIPID PANEL; Future    2. Hypercholesterolemia  -     LIPID PANEL; Future    3. Screen for colon cancer  -     COLOGUARD TEST (FECAL DNA COLORECTAL CANCER SCREENING)            Subjective:       Prior to Admission medications    Medication Sig Start Date End Date Taking? Authorizing Provider   gabapentin (NEURONTIN) 800 mg tablet Take 1 Tab by mouth three (3) times daily. Max Daily Amount: 2,400 mg. 5/14/20   Magnus Pollock MD   metFORMIN (GLUCOPHAGE) 500 mg tablet TAKE 1 TABLET BY MOUTH TWICE DAILY WITH MEALS 3/12/20   Magnus Pollock MD   levothyroxine (SYNTHROID) 125 mcg tablet Take 1 Tab by mouth Daily (before breakfast). 12/17/19   Clayton Cam NP   carvedilol (COREG) 12.5 mg tablet 25 mg. 10/10/19   Provider, Historical   fluticasone propionate (FLONASE) 50 mcg/actuation nasal spray SHAKE LQ AND U 1 SPR IEN QD FOR 10 DAYS UTD 11/6/19   Provider, Historical   mirtazapine (REMERON) 15 mg tablet TK 1 T PO QD BEFORE BEDTIME 11/6/19   Provider, Historical   methotrexate (TREXALL) 10 mg tablet Take 2 Tabs by mouth Every Saturday.  11/27/19   Jeancarlos Miller MD   atorvastatin (LIPITOR) 40 mg tablet TAKE 1 TABLET BY MOUTH DAILY 9/10/19   Janene Gardner MD   ergocalciferol (ERGOCALCIFEROL) 50,000 unit capsule Take 1 Cap by mouth every seven (7) days. 9/3/19   Olga Lange MD   cyanocobalamin (VITAMIN B-12) 500 mcg tablet Take 250 mcg by mouth daily. Provider, Historical   abatacept (ORENCIA) 125 mg/mL syrg 1 mL by SubCUTAneous route every seven (7) days.  8/28/19   Olga Lange MD   folic acid (FOLVITE) 1 mg tablet TAKE 1 TABLET BY MOUTH DAILY 8/28/19   Olga Lange MD   buPROPion XL (WELLBUTRIN XL) 300 mg XL tablet TAKE 1 TABLET BY MOUTH EVERY MORNING 7/18/19   Janene Gardner MD   lancets (FREESTYLE LANCETS) 28 gauge misc CHECK BLOOD SUGAR ONCE DAILY 10/25/18   Janene Gardner MD   glucose blood VI test strips (FREESTYLE TEST) strip CHECK BLOOD SUGAR DAILY 10/25/18   Janene Gardner MD   HYDROcodone-acetaminophen [facility]) 5-325 mg per tablet TK 1 T PO  UP TO tid PRN 9/18/18   Provider, Historical   Blood-Glucose Meter (BLOOD GLUCOSE MONITORING) monitoring kit E11.8  To check blood sugar daily 8/28/17   Janene Gardner MD     Patient Active Problem List    Diagnosis Date Noted    Age-related osteoporosis without current pathological fracture 11/27/2019    Type 2 diabetes with nephropathy ([facility] 08/22/2018    Long-term use of immunosuppressant medication 10/24/2017    Morbid obesity with BMI of 45.0-49.9, adult ([address] 01/17/2017    Vitamin D deficiency 01/17/2017    Osteopenia 01/17/2017    Severe episode of recurrent major depressive disorder, without psychotic features ([address] 01/17/2017    Controlled type 2 diabetes mellitus with complication, without long-term current use of insulin ([address] 11/09/2016    Long term (current) use of systemic steroids 11/03/2016    Long term current use of immunosuppressive drug 11/03/2016    Essential hypertension 11/03/2016    Hypercholesterolemia 11/19/2015    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 11/19/2015    Seropositive rheumatoid arthritis of multiple sites ([address] 07/23/2015    Easy bruising 07/23/2015    Acquired hypothyroidism 07/23/2015    Postmenopausal depression 07/23/2015    Lumbar stenosis with neurogenic claudication 07/07/2014    Arthritis of knee 01/30/2012     Current Outpatient Medications   Medication Sig Dispense Refill    gabapentin (NEURONTIN) 800 mg tablet Take 1 Tab by mouth three (3) times daily. Max Daily Amount: 2,400 mg. 90 Tab 1    metFORMIN (GLUCOPHAGE) 500 mg tablet TAKE 1 TABLET BY MOUTH TWICE DAILY WITH MEALS 60 Tab 0    levothyroxine (SYNTHROID) 125 mcg tablet Take 1 Tab by mouth Daily (before breakfast). 30 Tab 2    carvedilol (COREG) 12.5 mg tablet 25 mg.  1    fluticasone propionate (FLONASE) 50 mcg/actuation nasal spray SHAKE LQ AND U 1 SPR IEN QD FOR 10 DAYS UTD  0    mirtazapine (REMERON) 15 mg tablet TK 1 T PO QD BEFORE BEDTIME  2    methotrexate (TREXALL) 10 mg tablet Take 2 Tabs by mouth Every Saturday. 28 Tab 0    atorvastatin (LIPITOR) 40 mg tablet TAKE 1 TABLET BY MOUTH DAILY 30 Tab 0    ergocalciferol (ERGOCALCIFEROL) 50,000 unit capsule Take 1 Cap by mouth every seven (7) days. 8 Cap 0    cyanocobalamin (VITAMIN B-12) 500 mcg tablet Take 250 mcg by mouth daily.  abatacept (ORENCIA) 125 mg/mL syrg 1 mL by SubCUTAneous route every seven (7) days. 4 Syringe 11    folic acid (FOLVITE) 1 mg tablet TAKE 1 TABLET BY MOUTH DAILY 90 Tab 0    buPROPion XL (WELLBUTRIN XL) 300 mg XL tablet TAKE 1 TABLET BY MOUTH EVERY MORNING 30 Tab 1    lancets (FREESTYLE LANCETS) 28 gauge misc CHECK BLOOD SUGAR ONCE DAILY 100 Lancet 3    glucose blood VI test strips (FREESTYLE TEST) strip CHECK BLOOD SUGAR DAILY 100 Strip 0    HYDROcodone-acetaminophen (NORCO) 5-325 mg per tablet TK 1 T PO  UP TO tid PRN  0    Blood-Glucose Meter (BLOOD GLUCOSE MONITORING) monitoring kit E11.8  To check blood sugar daily 1 Kit 0       ROS    Objective:   No flowsheet data found.      [INSTRUCTIONS:  \"[x]\" Indicates a positive item  \"[]\" Indicates a negative item  -- DELETE ALL ITEMS NOT EXAMINED]    Constitutional: [x] Appears well-developed and well-nourished [x] No apparent distress      [] Abnormal -     Mental status: [x] Alert and awake  [x] Oriented to person/place/time [x] Able to follow commands    [] Abnormal -     Eyes:   EOM    [x]  Normal    [] Abnormal -   Sclera  [x]  Normal    [] Abnormal -          Discharge [x]  None visible   [] Abnormal -     HENT: [x] Normocephalic, atraumatic  [] Abnormal -   [x] Mouth/Throat: Mucous membranes are moist    External Ears [x] Normal  [] Abnormal -    Neck: [x] No visualized mass [] Abnormal -     Pulmonary/Chest: [x] Respiratory effort normal   [x] No visualized signs of difficulty breathing or respiratory distress        [] Abnormal -      Musculoskeletal:   [x] Normal gait with no signs of ataxia         [x] Normal range of motion of neck        [] Abnormal -     Neurological:        [x] No Facial Asymmetry (Cranial nerve 7 motor function) (limited exam due to video visit)          [x] No gaze palsy        [] Abnormal -          Skin:        [x] No significant exanthematous lesions or discoloration noted on facial skin         [] Abnormal -            Psychiatric:       [x] Normal Affect [] Abnormal -        [x] No Hallucinations    Other pertinent observable physical exam findings:-        We discussed the expected course, resolution and complications of the diagnosis(es) in detail. Medication risks, benefits, costs, interactions, and alternatives were discussed as indicated. I advised her to contact the office if her condition worsens, changes or fails to improve as anticipated. She expressed understanding with the diagnosis(es) and plan. Wing Mantilla, who was evaluated through a patient-initiated, synchronous (real-time) audio-video encounter, and/or her healthcare decision maker, is aware that it is a billable service, with coverage as determined by her insurance carrier.  She provided verbal consent to proceed: Yes, and patient identification was verified. It was conducted pursuant to the emergency declaration under the [address] [place] [address] and the [employer]. A caregiver was present when appropriate. Ability to conduct physical exam was limited. I was at home. The patient was at home.       Dillon Garcia MD

## 2020-09-02 ENCOUNTER — TELEPHONE (OUTPATIENT)
Dept: FAMILY MEDICINE CLINIC | Age: 65
End: 2020-09-02

## 2020-09-02 DIAGNOSIS — G62.9 PERIPHERAL POLYNEUROPATHY: ICD-10-CM

## 2020-09-02 NOTE — TELEPHONE ENCOUNTER
----- Message from Carter Vee sent at 9/1/2020 10:49 AM EDT -----  Regarding: Dr. Kelsy Wilkerson Message/Vendor Calls    Caller's first and last name: n/a      Reason for call: dispatch health said no on the blood work      Callback required yes/no and why: yes      Best contact number(s): [phone]      Details to clarify the request: n/a      Carter Vee

## 2020-09-07 RX ORDER — GABAPENTIN 800 MG/1
800 TABLET ORAL 3 TIMES DAILY
Qty: 90 TAB | Refills: 1 | Status: SHIPPED | OUTPATIENT
Start: 2020-09-07 | End: 2020-11-21

## 2020-09-25 NOTE — TELEPHONE ENCOUNTER
I had asked that they draw blood for her and they said she was not in the right area and could not go out

## 2021-01-11 ENCOUNTER — VIRTUAL VISIT (OUTPATIENT)
Dept: FAMILY MEDICINE CLINIC | Age: 66
End: 2021-01-11
Payer: COMMERCIAL

## 2021-01-11 DIAGNOSIS — G62.9 PERIPHERAL POLYNEUROPATHY: Primary | ICD-10-CM

## 2021-01-11 PROCEDURE — 3017F COLORECTAL CA SCREEN DOC REV: CPT | Performed by: FAMILY MEDICINE

## 2021-01-11 PROCEDURE — G8421 BMI NOT CALCULATED: HCPCS | Performed by: FAMILY MEDICINE

## 2021-01-11 PROCEDURE — G8536 NO DOC ELDER MAL SCRN: HCPCS | Performed by: FAMILY MEDICINE

## 2021-01-11 PROCEDURE — G8756 NO BP MEASURE DOC: HCPCS | Performed by: FAMILY MEDICINE

## 2021-01-11 PROCEDURE — 1090F PRES/ABSN URINE INCON ASSESS: CPT | Performed by: FAMILY MEDICINE

## 2021-01-11 PROCEDURE — G9717 DOC PT DX DEP/BP F/U NT REQ: HCPCS | Performed by: FAMILY MEDICINE

## 2021-01-11 PROCEDURE — G9899 SCRN MAM PERF RSLTS DOC: HCPCS | Performed by: FAMILY MEDICINE

## 2021-01-11 PROCEDURE — 1101F PT FALLS ASSESS-DOCD LE1/YR: CPT | Performed by: FAMILY MEDICINE

## 2021-01-11 PROCEDURE — G8428 CUR MEDS NOT DOCUMENT: HCPCS | Performed by: FAMILY MEDICINE

## 2021-01-11 PROCEDURE — 99213 OFFICE O/P EST LOW 20 MIN: CPT | Performed by: FAMILY MEDICINE

## 2021-01-11 NOTE — PROGRESS NOTES
Kiran Rajput is a 77 y.o. female who was seen by synchronous (real-time) audio-video technology on 1/11/2021 for Peripheral Neuropathy    No refills needed  She is c/o worse neuropathy in feet, this is afecting her balance  She takes gabapentin and that no longer works   a1c is now 5.5      Assessment & Plan:   Diagnoses and all orders for this visit:    1. Peripheral polyneuropathy  -     REFERRAL TO NEUROLOGY            Subjective:       Prior to Admission medications    Medication Sig Start Date End Date Taking? Authorizing Provider   gabapentin (NEURONTIN) 800 mg tablet TAKE 1 TABLET BY MOUTH THREE TIMES DAILY. MAX DAILY AMOUNT: 2400 MG 11/21/20   Musa Pimentel MD   metFORMIN (GLUCOPHAGE) 500 mg tablet TAKE 1 TABLET BY MOUTH TWICE DAILY WITH MEALS 3/12/20   Musa Pimentel MD   levothyroxine (SYNTHROID) 125 mcg tablet Take 1 Tab by mouth Daily (before breakfast). 12/17/19   Gabriella Maldonado NP   carvedilol (COREG) 12.5 mg tablet 25 mg. 10/10/19   Provider, Historical   fluticasone propionate (FLONASE) 50 mcg/actuation nasal spray SHAKE LQ AND U 1 SPR IEN QD FOR 10 DAYS UTD 11/6/19   Provider, Historical   mirtazapine (REMERON) 15 mg tablet TK 1 T PO QD BEFORE BEDTIME 11/6/19   Provider, Historical   methotrexate (TREXALL) 10 mg tablet Take 2 Tabs by mouth Every Saturday. 11/27/19   Will MD Shaheen   atorvastatin (LIPITOR) 40 mg tablet TAKE 1 TABLET BY MOUTH DAILY 9/10/19   Musa Pimentel MD   ergocalciferol (ERGOCALCIFEROL) 50,000 unit capsule Take 1 Cap by mouth every seven (7) days. 9/3/19   Will MD Shaheen   cyanocobalamin (VITAMIN B-12) 500 mcg tablet Take 250 mcg by mouth daily. Provider, Historical   abatacept (ORENCIA) 125 mg/mL syrg 1 mL by SubCUTAneous route every seven (7) days.  8/28/19   Will MD Shaheen   folic acid (FOLVITE) 1 mg tablet TAKE 1 TABLET BY MOUTH DAILY 8/28/19   Will MD Shaheen   buPROPion XL (WELLBUTRIN XL) 300 mg XL tablet TAKE 1 TABLET BY MOUTH EVERY MORNING 7/18/19   Musa Pimentel MD   lancets (FREESTYLE LANCETS) 28 gauge misc CHECK BLOOD SUGAR ONCE DAILY 10/25/18   Musa Pimentel MD   glucose blood VI test strips (FREESTYLE TEST) strip CHECK BLOOD SUGAR DAILY 10/25/18   Musa Pimentel MD   HYDROcodone-acetaminophen [facility]) 5-325 mg per tablet TK 1 T PO  UP TO tid PRN 9/18/18   Provider, Historical   Blood-Glucose Meter (BLOOD GLUCOSE MONITORING) monitoring kit E11.8  To check blood sugar daily 8/28/17   Musa Pimentel MD     Patient Active Problem List    Diagnosis Date Noted    Age-related osteoporosis without current pathological fracture 11/27/2019    Type 2 diabetes with nephropathy ([facility] [address] 08/22/2018    Long-term use of immunosuppressant medication 10/24/2017    Morbid obesity with BMI of 45.0-49.9, adult ([facility] [address] 01/17/2017    Vitamin D deficiency 01/17/2017    Osteopenia 01/17/2017    Severe episode of recurrent major depressive disorder, without psychotic features ([facility] [address] 01/17/2017    Controlled type 2 diabetes mellitus with complication, without long-term current use of insulin ([facility] [address] 11/09/2016    Long term (current) use of systemic steroids 11/03/2016    Long term current use of immunosuppressive drug 11/03/2016    Essential hypertension 11/03/2016    Hypercholesterolemia 11/19/2015    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  MAX DAILY AMOUNT: 2400 MG 90 Tab 0    metFORMIN (GLUCOPHAGE) 500 mg tablet TAKE 1 TABLET BY MOUTH TWICE DAILY WITH MEALS 60 Tab 0    levothyroxine (SYNTHROID) 125 mcg tablet Take 1 Tab by mouth Daily (before breakfast). 30 Tab 2    carvedilol (COREG) 12.5 mg tablet 25 mg.  1    fluticasone propionate (FLONASE) 50 mcg/actuation nasal spray SHAKE LQ AND U 1 SPR IEN QD FOR 10 DAYS UTD  0    mirtazapine (REMERON) 15 mg tablet TK 1 T PO QD BEFORE BEDTIME  2    methotrexate (TREXALL) 10 mg tablet Take 2 Tabs by mouth Every Saturday. 28 Tab 0    atorvastatin (LIPITOR) 40 mg tablet TAKE 1 TABLET BY MOUTH DAILY 30 Tab 0    ergocalciferol (ERGOCALCIFEROL) 50,000 unit capsule Take 1 Cap by mouth every seven (7) days. 8 Cap 0    cyanocobalamin (VITAMIN B-12) 500 mcg tablet Take 250 mcg by mouth daily.  abatacept (ORENCIA) 125 mg/mL syrg 1 mL by SubCUTAneous route every seven (7) days. 4 Syringe 11    folic acid (FOLVITE) 1 mg tablet TAKE 1 TABLET BY MOUTH DAILY 90 Tab 0    buPROPion XL (WELLBUTRIN XL) 300 mg XL tablet TAKE 1 TABLET BY MOUTH EVERY MORNING 30 Tab 1    lancets (FREESTYLE LANCETS) 28 gauge misc CHECK BLOOD SUGAR ONCE DAILY 100 Lancet 3    glucose blood VI test strips (FREESTYLE TEST) strip CHECK BLOOD SUGAR DAILY 100 Strip 0    HYDROcodone-acetaminophen (NORCO) 5-325 mg per tablet TK 1 T PO  UP TO tid PRN  0    Blood-Glucose Meter (BLOOD GLUCOSE MONITORING) monitoring kit E11.8  To check blood sugar daily 1 Kit 0       ROS    Objective:   No flowsheet data found.      [INSTRUCTIONS:  \"[x]\" Indicates a positive item  \"[]\" Indicates a negative item  -- DELETE ALL ITEMS NOT EXAMINED]    Constitutional: [x] Appears well-developed and well-nourished [x] No apparent distress      [] Abnormal -     Mental status: [x] Alert and awake  [x] Oriented to person/place/time [x] Able to follow commands    [] Abnormal -     Eyes:   EOM    [x]  Normal    [] Abnormal -   Sclera  [x]  Normal    [] Abnormal -          Discharge [x]  None visible   [] Abnormal -     HENT: [x] Normocephalic, atraumatic  [] Abnormal -   [x] Mouth/Throat: Mucous membranes are moist    External Ears [x] Normal  [] Abnormal -    Neck: [x] No visualized mass [] Abnormal -     Pulmonary/Chest: [x] Respiratory effort normal   [x] No visualized signs of difficulty breathing or respiratory distress        [] Abnormal -      Musculoskeletal:   [x] Normal gait with no signs of ataxia         [x] Normal range of motion of neck        [] Abnormal -     Neurological:        [x] No Facial Asymmetry (Cranial nerve 7 motor function) (limited exam due to video visit)          [x] No gaze palsy        [] Abnormal -          Skin:        [x] No significant exanthematous lesions or discoloration noted on facial skin         [] Abnormal -            Psychiatric:       [x] Normal Affect [] Abnormal -        [x] No Hallucinations    Other pertinent observable physical exam findings:-        We discussed the expected course, resolution and complications of the diagnosis(es) in detail. Medication risks, benefits, costs, interactions, and alternatives were discussed as indicated. I advised her to contact the office if her condition worsens, changes or fails to improve as anticipated. She expressed understanding with the diagnosis(es) and plan. Ligia Schmidt, who was evaluated through a patient-initiated, synchronous (real-time) audio-video encounter, and/or her healthcare decision maker, is aware that it is a billable service, with coverage as determined by her insurance carrier. She provided verbal consent to proceed: Yes, and patient identification was verified. It was conducted pursuant to the emergency declaration under the [address] [place] [address] authority and the [employer] and [employer] Act. A caregiver was present when appropriate. Ability to conduct physical exam was limited. I was at home. The patient was at home.       Tuyet Phelan MD

## 2021-03-19 NOTE — TELEPHONE ENCOUNTER
Spoke with Tima Danielson and obtained the fax number to send the notes and Rituxan order to. I explained to him that we were not reordering the Rituxan due to the fact that she has not been seen since Jan, and has \"no showed\" to her infusion appts. He stated an understanding. Today's visit was performed with the assistance of  Services.   Name of : PIA   Service used: Video: Third Party       Neurosurgery Follow-Up    Subjective   Patient ID: Aliya Laura is a 62 year old male.    Chief Complaint   Patient presents with   • Follow-up     Surgical Followup  Associated symptoms include numbness. Pertinent negatives include no arthralgias or weakness.       HPI:   Mr. Laura is seen today in 2 year follow up from L3-5 posterior instrumented fusion with Dr. Rojas on 4/30/19 and T7-9 modified costortransversectomy for resection of thoracic discs as well as T7- laminectomy and T7-10 PIF on 4/3/18. He is unaccompanied today. He reports that due to the pandemic he was unable to proceed with PT as discussed at his office visit last year. He continues to note pain in the mid back. He states that his work tends to increase his back pain. He does continue to do HEP exercises on his own at home. No new symptoms or complaints. He would like to proceed with PT now that he is vaccinated.   Review of Systems   Constitutional: Negative.    Musculoskeletal: Positive for back pain. Negative for arthralgias and gait problem.   Neurological: Positive for numbness. Negative for weakness.   All other systems reviewed and are negative.      Objective    Visit Vitals  BP (!) 154/95 (BP Location: LUE - Left upper extremity, Patient Position: Sitting, Cuff Size: Large Adult)   Pulse 67   Resp 16   Ht 5' 6\" (1.676 m)   Wt 95.3 kg (210 lb)   BMI 33.89 kg/m²       Neurological Exam  Mental Status  Awake, alert and oriented to person, place and time. Recent and remote memory are intact. Speech is normal. Language is fluent with no aphasia. Attention and concentration are normal. Fund of knowledge is appropriate for level of education.    Cranial Nerves  CN II: Visual acuity is normal.  CN III, IV, VI: Extraocular movements intact bilaterally. Pupils equal round and  reactive to light bilaterally.  CN V:  Right: Facial sensation is normal.  Left: Facial sensation is normal on the left.  CN VII:  Right: There is no facial weakness.  Left: There is no facial weakness.  CN VIII:  Right: Hearing is normal.  Left: Hearing is normal.  CN IX, X:  Right: Palate is normal.  Left: Palate is normal.  CN XI: Shoulder shrug strength is normal.  CN XII: Tongue midline without atrophy or fasciculations.    Motor  Normal muscle bulk throughout. No fasciculations present. Normal muscle tone. No abnormal involuntary movements.                                             Right                     Left   Iliopsoas                               5                          5   Quadriceps                           5                          5   Hamstring                             5                          5   Gastrocnemius                     5                           5   Anterior tibialis                      5                          5  Ankle dorsiflexor                   5                          5  Ankle plantar flexor              5                           5  Extensor hallucis longus      5                           5    Sensory  Light touch is normal in upper and lower extremities.     Coordination  Finger-to-nose, rapid alternating movements and heel-to-shin normal bilaterally without dysmetria.    Gait  Casual gait is normal including stance, stride, and arm swing.        Assessment   Problem List Items Addressed This Visit        Other    History of thoracic spinal fusion - Primary    Relevant Orders    SERVICE TO PHYSICAL THERAPY    S/P lumbar spinal fusion    Relevant Orders    SERVICE TO PHYSICAL THERAPY          I have personally reviewed imaging/labs.  EXAM: XR LUMBAR SPINE 2 OR 3 VIEWS, XR THORACIC SPINE 2 VIEWS     INDICATION: S/P thoracic and lumbar fusion.   S/p lumbar fusion.     COMPARISON: 11/19/2019     TECHNIQUE:   1.  Upright AP and lateral radiographs of the thoracic  spine.  2.  Upright AP and lateral radiographs of the lumbar spine.     FINDINGS:      THORACIC SPINE: Postsurgical changes of T7-T10 posterior instrumented  fusion are seen with single right T8 pedicular screw with apparent cement  augmentation or marking and single left T9 pedicular screw.  No evidence of  hardware fracture or loosening is seen.  Spinal alignment appears  unchanged.  Vertebral body heights are preserved.  No acute fracture is  seen.  Degenerative disc and facet disease is unchanged.        LUMBAR SPINE: Postsurgical changes of L3-L4 posterior instrumented and  interbody fusion are seen in unchanged alignment.  There is lucency  eccentric to the left which is likely related to posterior decompression.   There is no evidence of hardware fracture or loosening.  Spinal alignment  is unchanged.  Vertebral body heights are preserved.  No acute fracture is  seen.  Degenerative disc and facet disease is again seen.  Multiple clips  are seen in the right lower quadrant.  Degenerative changes are also noted  at the sacroiliac joints.     IMPRESSION:  1.  Postsurgical changes of T7-T10 posterior instrumented fusion in  unchanged alignment.  2.  Postsurgical changes of L3-L4 posterior instrumented and interbody  fusion in unchanged alignment.     Electronically Signed by: LOUISE DONAHUE M.D.   Signed on: 3/19/2021 10:47 AM     PLAN:   Mr. Laura is a pleasant 63yo gentleman seen today in follow up of thoracic and lumbar fusion. I will place a new referral to physical therapy. He was provided with the [facility] therapy department contact information. We discussed that he may continue long term to struggle with some degree of soreness in the back, but continuing his HEP exercises is the best thing he can do on his own to manage. He does not need further routine follow up visits. He may contact us at any time if further concerns or questions arise.  All questions were answered in today's visit to the patient's  satisfaction.       KIMBER RogerC

## 2021-03-25 ENCOUNTER — TRANSCRIBE ORDER (OUTPATIENT)
Dept: NEUROLOGY | Age: 66
End: 2021-03-25

## 2021-04-14 ENCOUNTER — TELEPHONE (OUTPATIENT)
Dept: FAMILY MEDICINE CLINIC | Age: 66
End: 2021-04-14

## 2021-04-14 NOTE — TELEPHONE ENCOUNTER
Informed pt that rx was sent in on Monday         ----- Message from Polo Pierre sent at 4/14/2021 12:58 PM EDT -----  Regarding: Dr. Josiah Pitts Refill  Medication Refill    Caller (if not patient): N/A      Relationship of caller (if not patient): N/A      Best contact number(s): [phone]      Name of medication and dosage if known: \"Gabapentin\" 800mg      Is patient out of this medication (yes/no): yes      Pharmacy name: [address] [place] [address] listed in chart? (yes/no): yes  Pharmacy phone number: unknown      Details to clarify the request: N/A      Polo Pierre

## 2021-05-17 ENCOUNTER — OFFICE VISIT (OUTPATIENT)
Dept: FAMILY MEDICINE CLINIC | Age: 66
End: 2021-05-17
Payer: MEDICARE

## 2021-05-17 VITALS
TEMPERATURE: 98.1 F | WEIGHT: 230 LBS | SYSTOLIC BLOOD PRESSURE: 102 MMHG | BODY MASS INDEX: 39.27 KG/M2 | RESPIRATION RATE: 16 BRPM | HEIGHT: 64 IN | DIASTOLIC BLOOD PRESSURE: 55 MMHG | HEART RATE: 80 BPM | OXYGEN SATURATION: 96 %

## 2021-05-17 DIAGNOSIS — I10 ESSENTIAL HYPERTENSION: Primary | ICD-10-CM

## 2021-05-17 DIAGNOSIS — E03.9 ACQUIRED HYPOTHYROIDISM: ICD-10-CM

## 2021-05-17 DIAGNOSIS — E55.9 VITAMIN D DEFICIENCY: ICD-10-CM

## 2021-05-17 DIAGNOSIS — G62.9 PERIPHERAL POLYNEUROPATHY: ICD-10-CM

## 2021-05-17 PROCEDURE — G8427 DOCREV CUR MEDS BY ELIG CLIN: HCPCS | Performed by: NURSE PRACTITIONER

## 2021-05-17 PROCEDURE — 1090F PRES/ABSN URINE INCON ASSESS: CPT | Performed by: NURSE PRACTITIONER

## 2021-05-17 PROCEDURE — G8536 NO DOC ELDER MAL SCRN: HCPCS | Performed by: NURSE PRACTITIONER

## 2021-05-17 PROCEDURE — 1101F PT FALLS ASSESS-DOCD LE1/YR: CPT | Performed by: NURSE PRACTITIONER

## 2021-05-17 PROCEDURE — 99214 OFFICE O/P EST MOD 30 MIN: CPT | Performed by: NURSE PRACTITIONER

## 2021-05-17 PROCEDURE — G8752 SYS BP LESS 140: HCPCS | Performed by: NURSE PRACTITIONER

## 2021-05-17 PROCEDURE — G8417 CALC BMI ABV UP PARAM F/U: HCPCS | Performed by: NURSE PRACTITIONER

## 2021-05-17 PROCEDURE — G9899 SCRN MAM PERF RSLTS DOC: HCPCS | Performed by: NURSE PRACTITIONER

## 2021-05-17 PROCEDURE — G8754 DIAS BP LESS 90: HCPCS | Performed by: NURSE PRACTITIONER

## 2021-05-17 PROCEDURE — G9717 DOC PT DX DEP/BP F/U NT REQ: HCPCS | Performed by: NURSE PRACTITIONER

## 2021-05-17 PROCEDURE — 3017F COLORECTAL CA SCREEN DOC REV: CPT | Performed by: NURSE PRACTITIONER

## 2021-05-17 RX ORDER — CARVEDILOL 12.5 MG/1
TABLET ORAL
Qty: 30 TAB | Refills: 1 | Status: SHIPPED | OUTPATIENT
Start: 2021-05-17 | End: 2021-05-25 | Stop reason: ALTCHOICE

## 2021-05-17 RX ORDER — ERGOCALCIFEROL 1.25 MG/1
50000 CAPSULE ORAL
Qty: 8 CAP | Refills: 0 | Status: SHIPPED | OUTPATIENT
Start: 2021-05-17 | End: 2022-03-13 | Stop reason: SDUPTHER

## 2021-05-17 RX ORDER — GABAPENTIN 800 MG/1
TABLET ORAL
Qty: 90 TAB | Refills: 0 | Status: SHIPPED | OUTPATIENT
Start: 2021-05-17 | End: 2021-06-18

## 2021-05-17 NOTE — PATIENT INSTRUCTIONS
Please follow up with Referrals We will follow up on labs when they return Schedule return visit as needed with Dr Mason Lange Take medications as prescribed Drink plenty of water Diabetic Neuropathy: Care Instructions Your Care Instructions When you have diabetes, your blood sugar level may get too high. Over time, high blood sugar levels can damage nerves. This is called diabetic neuropathy. Nerve damage can cause pain, burning, tingling, and numbness and may leave you feeling weak. The feet are often affected. When you have nerve damage in your feet, you cannot feel your feet and toes as well as normal and may not notice cuts or sores. Even a small injury can lead to a serious infection. It is very important that you follow your doctor's advice on foot care. Sometimes diabetes damages nerves that help the body function. If this happens, your blood pressure, sweating, digestion, and urination might be affected. Your doctor may give you a target blood sugar level that is higher or lower than you are used to. Try to keep your blood sugar very close to this target level to prevent more damage. Follow-up care is a key part of your treatment and safety. Be sure to make and go to all appointments, and call your doctor if you are having problems. It's also a good idea to know your test results and keep a list of the medicines you take. How can you care for yourself at home? · Take your medicines exactly as prescribed. Call your doctor if you think you are having a problem with your medicine. It is very important that you take your insulin or diabetes pills as your doctor tells you. · Try to keep blood sugar at your target level. ? Eat a variety of healthy foods, with carbohydrate spread out in your meals. A dietitian can help you plan meals. ? Try to get at least 30 minutes of exercise on most days. ? Check your blood sugar as many times each day as your doctor recommends.  
· Take and record your blood pressure at home if your doctor tells you to. Learn the importance of the two measures of blood pressure (such as 130 over 80, or 130/80). To take your blood pressure at home: ? Ask your doctor to check your blood pressure monitor to be sure it is accurate and the cuff fits you. Also ask your doctor to watch you to make sure that you are using it right. ? Do not use medicine known to raise blood pressure (such as some nasal decongestant sprays) before taking your blood pressure. ? Avoid taking your blood pressure if you have just exercised or are nervous or upset. Rest at least 15 minutes before you take a reading. · Take pain medicines exactly as directed. ? If the doctor gave you a prescription medicine for pain, take it as prescribed. ? If you are not taking a prescription pain medicine, ask your doctor if you can take an over-the-counter medicine. · Do not smoke. Smoking can increase your chance for a heart attack or stroke. If you need help quitting, talk to your doctor about stop-smoking programs and medicines. These can increase your chances of quitting for good. · Limit alcohol to 2 drinks a day for men and 1 drink a day for women. Too much alcohol can cause health problems. · Eat small meals often, rather than 2 or 3 large meals a day. To care for your feet · Prevent injury by wearing shoes at all times, even when you are indoors. · Do foot care as part of your daily routine. Wash your feet and then rub lotion on your feet, but not between your toes. Use a handheld mirror or magnifying mirror to inspect your feet for blisters, cuts, cracks, or sores. · Have your toenails trimmed and filed straight across. · Wear shoes and socks that fit well. Soft shoes that have good support and that fit well (such as tennis shoes) are best for your feet. · Check your shoes for any loose objects or rough edges before you put them on. · Ask your doctor to check your feet during each visit.  Your doctor may notice a foot problem you have missed. · Get early treatment for any foot problem, even a minor one. When should you call for help? Call your doctor now or seek immediate medical care if: 
  · You have symptoms of infection, such as: 
? Increased pain, swelling, warmth, or redness. ? Red streaks leading from the area. ? Pus draining from the area. ? A fever.  
  · You have new or worse numbness, pain, or tingling in any part of your body. Watch closely for changes in your health, and be sure to contact your doctor if: 
  · You have a new problem with your feet, such as: ? A new sore or ulcer. ? A break in the skin that is not healing after several days. ? Bleeding corns or calluses. ? An ingrown toenail.  
  · You do not get better as expected. Where can you learn more? Go to http://www.gray.com/ Enter R226 in the search box to learn more about \"Diabetic Neuropathy: Care Instructions. \" Current as of: August 31, 2020               Content Version: 12.8 © 2006-2021 [employer]. Care instructions adapted under license by [employer] (which disclaims liability or warranty for this information). If you have questions about a medical condition or this instruction, always ask your healthcare professional. [address] any warranty or liability for your use of this information. High Blood Pressure: Care Instructions Overview It's normal for blood pressure to go up and down throughout the day. But if it stays up, you have high blood pressure. Another name for high blood pressure is hypertension. Despite what a lot of people think, high blood pressure usually doesn't cause headaches or make you feel dizzy or lightheaded. It usually has no symptoms. But it does increase your risk of stroke, heart attack, and other problems. You and your doctor will talk about your risks of these problems based on your blood pressure.  
Your doctor will give you a goal for your blood pressure. Your goal will be based on your health and your age. Lifestyle changes, such as eating healthy and being active, are always important to help lower blood pressure. You might also take medicine to reach your blood pressure goal. 
Follow-up care is a key part of your treatment and safety. Be sure to make and go to all appointments, and call your doctor if you are having problems. It's also a good idea to know your test results and keep a list of the medicines you take. How can you care for yourself at home? Medical treatment · If you stop taking your medicine, your blood pressure will go back up. You may take one or more types of medicine to lower your blood pressure. Be safe with medicines. Take your medicine exactly as prescribed. Call your doctor if you think you are having a problem with your medicine. · Talk to your doctor before you start taking aspirin every day. Aspirin can help certain people lower their risk of a heart attack or stroke. But taking aspirin isn't right for everyone, because it can cause serious bleeding. · See your doctor regularly. You may need to see the doctor more often at first or until your blood pressure comes down. · If you are taking blood pressure medicine, talk to your doctor before you take decongestants or anti-inflammatory medicine, such as ibuprofen. Some of these medicines can raise blood pressure. · Learn how to check your blood pressure at home. Lifestyle changes · Stay at a healthy weight. This is especially important if you put on weight around the waist. Losing even 10 pounds can help you lower your blood pressure. · If your doctor recommends it, get more exercise. Walking is a good choice. Bit by bit, increase the amount you walk every day. Try for at least 30 minutes on most days of the week. You also may want to swim, bike, or do other activities. · Avoid or limit alcohol.  Talk to your doctor about whether you can drink any alcohol. · Try to limit how much sodium you eat to less than 2,300 milligrams (mg) a day. Your doctor may ask you to try to eat less than 1,500 mg a day. · Eat plenty of fruits (such as bananas and oranges), vegetables, legumes, whole grains, and low-fat dairy products. · Lower the amount of saturated fat in your diet. Saturated fat is found in animal products such as milk, cheese, and meat. Limiting these foods may help you lose weight and also lower your risk for heart disease. · Do not smoke. Smoking increases your risk for heart attack and stroke. If you need help quitting, talk to your doctor about stop-smoking programs and medicines. These can increase your chances of quitting for good. When should you call for help? Call  911 anytime you think you may need emergency care. This may mean having symptoms that suggest that your blood pressure is causing a serious heart or blood vessel problem. Your blood pressure may be over 180/120. For example, call 911 if: 
  · You have symptoms of a heart attack. These may include: 
? Chest pain or pressure, or a strange feeling in the chest. 
? Sweating. ? Shortness of breath. ? Nausea or vomiting. ? Pain, pressure, or a strange feeling in the back, neck, jaw, or upper belly or in one or both shoulders or arms. ? Lightheadedness or sudden weakness. ? A fast or irregular heartbeat.  
  · You have symptoms of a stroke. These may include: 
? Sudden numbness, tingling, weakness, or loss of movement in your face, arm, or leg, especially on only one side of your body. ? Sudden vision changes. ? Sudden trouble speaking. ? Sudden confusion or trouble understanding simple statements. ? Sudden problems with walking or balance. ? A sudden, severe headache that is different from past headaches.  
  · You have severe back or belly pain. Do not wait until your blood pressure comes down on its own. Get help right away.  
Call your doctor now or seek immediate care if: 
  · Your blood pressure is much higher than normal (such as 180/120 or higher), but you don't have symptoms.  
  · You think high blood pressure is causing symptoms, such as: 
? Severe headache. 
? Blurry vision. Watch closely for changes in your health, and be sure to contact your doctor if: 
  · Your blood pressure measures higher than your doctor recommends at least 2 times. That means the top number is higher or the bottom number is higher, or both.  
  · You think you may be having side effects from your blood pressure medicine. Where can you learn more? Go to http://www.gray.com/ Enter U658 in the search box to learn more about \"High Blood Pressure: Care Instructions. \" Current as of: August 31, 2020               Content Version: 12.8 © 2006-2021 [employer]. Care instructions adapted under license by [employer] (which disclaims liability or warranty for this information). If you have questions about a medical condition or this instruction, always ask your healthcare professional. [place] [address] any warranty or liability for your use of this information. Hypothyroidism: Care Instructions Your Care Instructions When you have hypothyroidism, your body doesn't make enough thyroid hormone. This hormone helps your body use energy. If your thyroid level is low, you may feel tired, be constipated, have an increase in your blood pressure, or have dry skin or memory problems. You may also get cold easily, even when it is warm. Women with low thyroid levels may have heavy menstrual periods. A blood test to find your thyroid-stimulating hormone (TSH) level is used to check for hypothyroidism. A high TSH level may mean that you have it. The treatment for hypothyroidism is thyroid hormone pills. You should start to feel better in 1 to 2 weeks. Most people need treatment for the rest of their lives.  You will need regular visits with your doctor to make sure you are doing well and that you have the right dose of medicine. Follow-up care is a key part of your treatment and safety. Be sure to make and go to all appointments, and call your doctor if you are having problems. It's also a good idea to know your test results and keep a list of the medicines you take. How can you care for yourself at home? · Take your thyroid hormone medicine exactly as prescribed. Call your doctor if you think you are having a problem with your medicine. Most people do not have side effects if they take the right amount of medicine regularly. ? Take the medicine 30 minutes before breakfast, and do not take it with calcium, vitamins, or iron. ? Do not take extra doses of your thyroid medicine. It will not help you get better any faster, and it may cause side effects. ? If you forget to take a dose, do NOT take a double dose of medicine. Take your usual dose the next day. · Tell your doctor about all prescription, herbal, or over-the-counter products you take. · Take care of yourself. Eat a healthy diet, get enough sleep, and get regular exercise. When should you call for help? Call 911 anytime you think you may need emergency care. For example, call if: 
  · You passed out (lost consciousness).  
  · You have severe trouble breathing.  
  · You have a very slow heartbeat (less than 60 beats a minute).  
  · You have a low body temperature (95°F or below). Call your doctor now or seek immediate medical care if: 
  · You feel tired, sluggish, or weak.  
  · You have trouble remembering things or concentrating.  
  · You do not begin to feel better 2 weeks after starting your medicine. Watch closely for changes in your health, and be sure to contact your doctor if you have any problems. Where can you learn more? Go to http://www.gray.com/ Enter W573 in the search box to learn more about \"Hypothyroidism: Care Instructions. \" Current as of: March 31, 2020               Content Version: 12.8 © 2798-6605 [employer]. Care instructions adapted under license by [employer] (which disclaims liability or warranty for this information). If you have questions about a medical condition or this instruction, always ask your healthcare professional. [address] any warranty or liability for your use of this information. Neuropathic Pain: Care Instructions Your Care Instructions Neuropathic pain is caused by pressure on or damage to your nerves. It's often simply called nerve pain. Some people feel this type of pain all the time. For others, it comes and goes. Diabetes, shingles, or an injury can cause nerve pain. Many people say the pain feels sharp, burning, or stabbing. But some people feel it as a dull ache. In some cases, it makes your skin very sensitive. So touch, pressure, and other sensations that did not hurt before may now cause pain. It's important to know that this kind of pain is real and can affect your quality of life. It's also important to know that treatment can help. Treatment includes pain medicines, exercise, and physical therapy. Medicines can help reduce the number of pain signals that travel over the nerves. This can make the painful areas less sensitive. It can also help you sleep better and improve your mood. But medicines are only one part of successful treatment. Most people do best with more than one kind of treatment. Your doctor may recommend that you try cognitive-behavioral therapy and stress management. Or, if needed, you may decide to try to quit smoking, lower your blood pressure, or better control blood sugar. These kinds of healthy changes can also make a difference. If you feel that your treatment is not working, talk to your doctor. And be sure to tell your doctor if you think you might be depressed or anxious.  These are common problems that can also be treated. Follow-up care is a key part of your treatment and safety. Be sure to make and go to all appointments, and call your doctor if you are having problems. It's also a good idea to know your test results and keep a list of the medicines you take. How can you care for yourself at home? · Be safe with medicines. Read and follow all instructions on the label. ? If the doctor gave you a prescription medicine for pain, take it as prescribed. ? If you are not taking a prescription pain medicine, ask your doctor if you can take an over-the-counter medicine. · Save hard tasks for days when you have less pain. Follow a hard task with an easy task. And remember to take breaks. · Relax, and reduce stress. You may want to try deep breathing or meditation. These can help. · Keep moving. Gentle, daily exercise can help reduce pain. Your doctor or physical therapist can tell you what type of exercise is best for you. This may include walking, swimming, and stationary biking. It may also include stretches and range-of-motion exercises. · Try heat, cold packs, and massage. · Get enough sleep. Constant pain can make you more tired. If the pain makes it hard to sleep, talk with your doctor. · Think positively. Your thoughts can affect your pain. Do fun things to distract yourself from the pain. See a movie, read a book, listen to music, or spend time with a friend. · Keep a pain diary. Try to write down how strong your pain is and what it feels like. Also try to notice and write down how your moods, thoughts, sleep, activities, and medicine affect your pain. These notes can help you and your doctor find the best ways to treat your pain. Reducing constipation caused by pain medicine Pain medicines often cause constipation. To reduce constipation: 
· Include fruits, vegetables, beans, and whole grains in your diet each day. These foods are high in fiber.  
· Drink plenty of fluids, enough so that your urine is light yellow or clear like water. If you have kidney, heart, or liver disease and have to limit fluids, talk with your doctor before you increase the amount of fluids you drink. · Get some exercise every day. Build up slowly to 30 to 60 minutes a day on 5 or more days of the week. · Take a fiber supplement, such as Citrucel or Metamucil, every day if needed. Read and follow all instructions on the label. · Schedule time each day for a bowel movement. Having a daily routine may help. Take your time and do not strain when having a bowel movement. · Ask your doctor about a laxative. The goal is to have one easy bowel movement every 1 to 2 days. Do not let constipation go untreated for more than 3 days. When should you call for help? Call your doctor now or seek immediate medical care if: 
  · You feel sad, anxious, or hopeless for more than a few days. This could mean you are depressed. Depression is common in people who have a lot of pain. But it can be treated.  
  · You have trouble with bowel movements, such as: 
? No bowel movement in 3 days. ? Blood in the anal area, in your stool, or on the toilet paper. ? Diarrhea for more than 24 hours. Watch closely for changes in your health, and be sure to contact your doctor if: 
  · Your pain is getting worse.  
  · You can't sleep because of pain.  
  · You are very worried or anxious about your pain.  
  · You have trouble taking your pain medicine.  
  · You have any concerns about your pain medicine or its side effects.  
  · You have vomiting or cramps for more than 2 hours. Where can you learn more? Go to http://www.gray.com/ Enter W481 in the search box to learn more about \"Neuropathic Pain: Care Instructions. \" Current as of: August 4, 2020               Content Version: 12.8 © 6652-5184 [employer].   
Care instructions adapted under license by [facility] (which disclaims liability or warranty for this information). If you have questions about a medical condition or this instruction, always ask your healthcare professional. [address] any warranty or liability for your use of this information. Learning About Vitamin D Why is it important to get enough vitamin D? Your body needs vitamin D to absorb calcium. Calcium keeps your bones and muscles, including your heart, healthy and strong. If your muscles don't get enough calcium, they can cramp, hurt, or feel weak. You may have long-term (chronic) muscle aches and pains. If you don't get enough vitamin D throughout life, you have an increased chance of having thin and brittle bones (osteoporosis) in your later years. Children who don't get enough vitamin D may not grow as much as others their age. They also have a chance of getting a rare disease called rickets. It causes weak bones. Vitamin D and calcium are added to many foods. And your body uses sunshine to make its own vitamin D. How much vitamin D do you need? The recommended daily allowance (RDA) for vitamin D is 600 IU (international units) every day for people ages 3 through 79. Adults 71 and older need 800 IU every day. Blood tests for vitamin D can check your vitamin D level. But there is no standard normal range used by all laboratories. You're likely getting enough vitamin D if your levels are in the range of 20 to 50 ng/mL. How can you get more vitamin D? Foods that contain vitamin D include: 
· [place], tuna, and mackerel. These are some of the best foods to eat when you need to get more vitamin D. 
· Cheese, egg yolks, and beef liver. These foods have vitamin D in small amounts. · Milk, soy drinks, orange juice, yogurt, margarine, and some kinds of cereal have vitamin D added to them. Some people don't make vitamin D as well as others. They may have to take extra care in getting enough vitamin D. Things that reduce how much vitamin D your body makes include: · Dark skin, such as many  Americans have. · Age, especially if you are older than 72. · Digestive problems, such as Crohn's or celiac disease. · Liver and kidney disease. Some people who do not get enough vitamin D may need supplements. Are there any risks from taking vitamin D? 
· Too much vitamin D: 
? Can damage your kidneys. ? Can cause nausea and vomiting, constipation, and weakness. ? Raises the amount of calcium in your blood. If this happens, you can get confused or have an irregular heart rhythm. · Vitamin D may interact with other medicines. Tell your doctor about all of the medicines you take, including over-the-counter drugs, herbs, and pills. Tell your doctor about all of your current medical problems. Where can you learn more? Go to http://www.gray.com/ Enter [id number] in the search box to learn more about \"Learning About Vitamin D.\" 
Current as of: December 17, 2020               Content Version: 12.8 © 2006-2021 Healthwise, [facility]. Care instructions adapted under license by [employer] (which disclaims liability or warranty for this information). If you have questions about a medical condition or this instruction, always ask your healthcare professional. [address] any warranty or liability for your use of this information.

## 2021-05-17 NOTE — PROGRESS NOTES
1. Have you been to the ER, urgent care clinic since your last visit? Hospitalized since your last visit? No    2. Have you seen or consulted any other health care providers outside of the [address] [place] [address] since your last visit? Include any pap smears or colon screening. No     Chief Complaint   Patient presents with    Medication Refill    Diabetes    Hypertension    Cholesterol Problem    Labs     Visit Vitals  BP (!) 102/55 (BP 1 Location: Left arm, BP Patient Position: Sitting, BP Cuff Size: Adult)   Pulse 80   Temp 98.1 °F (36.7 °C) (Skin)   Resp 16   Ht 5' 4\" (1.626 m)   Wt 230 lb (104.3 kg)   SpO2 96%   BMI 39.48 kg/m²         3 most recent PHQ Screens 7/23/2015   Little interest or pleasure in doing things Nearly every day   Feeling down, depressed, irritable, or hopeless Nearly every day   Total Score PHQ 2 6     Abuse Screening Questionnaire 7/23/2015   Do you ever feel afraid of your partner? N   Are you in a relationship with someone who physically or mentally threatens you? N   Is it safe for you to go home?  Y     Learning Assessment 11/27/2019   PRIMARY LEARNER Patient   HIGHEST LEVEL OF EDUCATION - PRIMARY LEARNER  -   BARRIERS PRIMARY LEARNER -   CO-LEARNER CAREGIVER No   PRIMARY LANGUAGE ENGLISH    NEED -   LEARNER PREFERENCE PRIMARY DEMONSTRATION   LEARNING SPECIAL TOPICS -   ANSWERED BY patient    RELATIONSHIP SELF   ASSESSMENT COMMENT -

## 2021-05-17 NOTE — PROGRESS NOTES
Leslie Watson is a 77 y.o. female who presents to clinic today for the following:    Chief Complaint   Patient presents with    Medication Refill    Diabetes    Hypertension    Cholesterol Problem    Labs       Vitals:    05/17/21 1318   BP: (!) 102/55   Pulse: 80   Resp: 16   Temp: 98.1 °F (36.7 °C)   TempSrc: Skin   SpO2: 96%   Weight: 230 lb (104.3 kg)   Height: 5' 4\" (1.626 m)       Body mass index is 39.48 kg/m². Patients past medical, surgical and family histories were reviewed. Allergies and Medications reviewed and updated. Current Outpatient Medications:     gabapentin (NEURONTIN) 800 mg tablet, TAKE 1 TABLET BY MOUTH THREE TIMES DAILY. MAX DAILY AMOUNT: 3 TABLETS  Indications: neuropathic pain, Disp: 90 Tab, Rfl: 0    carvediloL (COREG) 12.5 mg tablet, 25 mg., Disp: 30 Tab, Rfl: 1    ergocalciferol (ERGOCALCIFEROL) 1,250 mcg (50,000 unit) capsule, Take 1 Cap by mouth every seven (7) days. , Disp: 8 Cap, Rfl: 0    metFORMIN (GLUCOPHAGE) 500 mg tablet, TAKE 1 TABLET BY MOUTH TWICE DAILY WITH MEALS, Disp: 60 Tab, Rfl: 0    levothyroxine (SYNTHROID) 125 mcg tablet, Take 1 Tab by mouth Daily (before breakfast). , Disp: 30 Tab, Rfl: 2    fluticasone propionate (FLONASE) 50 mcg/actuation nasal spray, SHAKE LQ AND U 1 SPR IEN QD FOR 10 DAYS UTD, Disp: , Rfl: 0    mirtazapine (REMERON) 15 mg tablet, TK 1 T PO QD BEFORE BEDTIME, Disp: , Rfl: 2    methotrexate (TREXALL) 10 mg tablet, Take 2 Tabs by mouth Every Saturday. , Disp: 28 Tab, Rfl: 0    atorvastatin (LIPITOR) 40 mg tablet, TAKE 1 TABLET BY MOUTH DAILY, Disp: 30 Tab, Rfl: 0    buPROPion XL (WELLBUTRIN XL) 300 mg XL tablet, TAKE 1 TABLET BY MOUTH EVERY MORNING, Disp: 30 Tab, Rfl: 1    glucose blood VI test strips (FreeStyle Test) strip, CHECK BLOOD SUGAR DAILY E11.8, Disp: 100 Strip, Rfl: 3    cyanocobalamin (VITAMIN B-12) 500 mcg tablet, Take 250 mcg by mouth daily. , Disp: , Rfl:     abatacept (ORENCIA) 125 mg/mL syrg, 1 mL by SubCUTAneous route every seven (7) days. , Disp: 4 Syringe, Rfl: 11    folic acid (FOLVITE) 1 mg tablet, TAKE 1 TABLET BY MOUTH DAILY, Disp: 90 Tab, Rfl: 0    lancets (FREESTYLE LANCETS) 28 gauge misc, CHECK BLOOD SUGAR ONCE DAILY, Disp: 100 Lancet, Rfl: 3    HYDROcodone-acetaminophen (NORCO) 5-325 mg per tablet, TK 1 T PO  UP TO tid PRN, Disp: , Rfl: 0    Blood-Glucose Meter (BLOOD GLUCOSE MONITORING) monitoring kit, E11.8 To check blood sugar daily, Disp: 1 Kit, Rfl: 0    No Known Allergies    Past Medical History:   Diagnosis Date    Anxiety     Arrhythmia     Arthritis     RA    Arthritis of knee 1/30/2012    Autoimmune disease (HCC)     RA    Bronchitis     Chronic pain     due to RA    Depression     Dyslipidemia     Morbid obesity (HCC)     Nausea & vomiting     Other screening mammogram 8/19/15    Mammogram in 1 year    Overweight(278.02)     Papanicolaou smear for cervical cancer screening 8/14/15    neg; HPV neg    Rheumatoid aortitis     SVT (supraventricular tachycardia) ([address] 1993    hx of. underwent cardiac ablation        Past Surgical History:   Procedure Laterality Date    HX GYN      BTL    HX HEENT  6-30-14    root canal    HX HIP REPLACEMENT      2006, 2009    HX HIP REPLACEMENT Right     HX KNEE REPLACEMENT      2012, Dr. Marry Gunn      2 rods and 4 bolts Dr. Peri Guadalupe  7/2014    HX ORTHOPAEDIC  2005 2009    bilateral total hip replacement    HX ORTHOPAEDIC      left toe    HX ORTHOPAEDIC  2012    bilateral knee replacements    HX ORTHOPAEDIC  2013    left elbow x 5    HX TONSILLECTOMY      [place] BREAST SURGERY PROCEDURE UNLISTED      right breast mass excision    [place] CARDIAC SURG PROCEDURE UNLIST  1993    cardiac ablation for SVT       Family History   Problem Relation Age of Onset    Diabetes Brother     Heart Disease Brother     Diabetes Mother     Heart Disease Father     Breast Cancer Other         Paternal Nilam Ankita       Social History Socioeconomic History    Marital status: SINGLE     Spouse name: Not on file    Number of children: Not on file    Years of education: Not on file    Highest education level: Not on file   Occupational History    Not on file   Social Needs    Financial resource strain: Not on file    Food insecurity     Worry: Not on file     Inability: Not on file    Transportation needs     Medical: Not on file     Non-medical: Not on file   Tobacco Use    Smoking status: Former Smoker     Packs/day: 0.50     Years: 20.00     Pack years: 10.00     Quit date: 2013     Years since quittin.3    Smokeless tobacco: Never Used   Substance and Sexual Activity    Alcohol use: No     Alcohol/week: 0.0 standard drinks    Drug use: No    Sexual activity: Never   Lifestyle    Physical activity     Days per week: Not on file     Minutes per session: Not on file    Stress: Not on file   Relationships    Social connections     Talks on phone: Not on file     Gets together: Not on file     Attends Yarsanism service: Not on file     Active member of club or organization: Not on file     Attends meetings of clubs or organizations: Not on file     Relationship status: Not on file    Intimate partner violence     Fear of current or ex partner: Not on file     Emotionally abused: Not on file     Physically abused: Not on file     Forced sexual activity: Not on file   Other Topics Concern    Not on file   Social History Narrative    Not on file           Physical Exam  Vitals signs and nursing note reviewed. Constitutional:       Appearance: She is obese. HENT:      Head: Normocephalic. Mouth/Throat:      Mouth: Mucous membranes are moist.   Eyes:      Pupils: Pupils are equal, round, and reactive to light. Neck:      Musculoskeletal: Normal range of motion. Cardiovascular:      Rate and Rhythm: Normal rate and regular rhythm. Pulses: Normal pulses. Heart sounds: Normal heart sounds.    Pulmonary: Effort: Pulmonary effort is normal.      Breath sounds: Normal breath sounds. Abdominal:      General: Abdomen is flat. Musculoskeletal:         General: Swelling and deformity present. Right lower leg: Edema present. Left lower leg: Edema present. Skin:     General: Skin is warm. Capillary Refill: Capillary refill takes less than 2 seconds. Neurological:      General: No focal deficit present. Mental Status: She is alert and oriented to person, place, and time. Mental status is at baseline. Psychiatric:         Mood and Affect: Mood normal.         Behavior: Behavior normal.          Pt seen in office for medication refill. Pt also requesting ref to Neurology closer to home due to limit transport and another Rheum MD for second opinion. These were done. Pt taking meds as prescribed, has cont joint and bone pain from RA. Advised pt she will need neurology eval to continue gabapentin refills. Pt will follow up as needed. Review of Systems   Constitutional: Negative for fever and malaise/fatigue. HENT: Negative for congestion, sinus pain and sore throat. Respiratory: Negative for cough and shortness of breath. Cardiovascular: Negative for chest pain. Gastrointestinal: Negative for diarrhea, nausea and vomiting. Genitourinary: Negative for dysuria. Musculoskeletal: Positive for joint pain. Skin: Negative. Neurological: Negative for dizziness and headaches. Endo/Heme/Allergies: Negative for environmental allergies. Psychiatric/Behavioral: Positive for depression. Negative for suicidal ideas. The patient is nervous/anxious. No results found. No results found for this or any previous visit (from the past 24 hour(s)). Assessment and Plan:    Encounter Diagnoses   Name Primary?     Essential hypertension Yes    Peripheral polyneuropathy     Vitamin D deficiency     Acquired hypothyroidism                 I have discussed the diagnosis with the patient and the intended plan as seen in the above orders. she has expressed understanding. The patient has received an after-visit summary and questions were answered concerning future plans. I have discussed medication side effects and warnings with the patient as well.         Electronically Signed: You Dolan NP

## 2021-05-18 LAB
25(OH)D3 SERPL-MCNC: 19.9 NG/ML (ref 30–100)
ALBUMIN SERPL-MCNC: 3.5 G/DL (ref 3.5–5)
ALBUMIN/GLOB SERPL: 0.8 {RATIO} (ref 1.1–2.2)
ALP SERPL-CCNC: 136 U/L (ref 45–117)
ALT SERPL-CCNC: 20 U/L (ref 12–78)
ANION GAP SERPL CALC-SCNC: 7 MMOL/L (ref 5–15)
AST SERPL-CCNC: 16 U/L (ref 15–37)
BILIRUB SERPL-MCNC: 0.4 MG/DL (ref 0.2–1)
BUN SERPL-MCNC: 28 MG/DL (ref 6–20)
BUN/CREAT SERPL: 41 (ref 12–20)
CALCIUM SERPL-MCNC: 8.9 MG/DL (ref 8.5–10.1)
CHLORIDE SERPL-SCNC: 106 MMOL/L (ref 97–108)
CO2 SERPL-SCNC: 27 MMOL/L (ref 21–32)
CREAT SERPL-MCNC: 0.68 MG/DL (ref 0.55–1.02)
GLOBULIN SER CALC-MCNC: 4.4 G/DL (ref 2–4)
GLUCOSE SERPL-MCNC: 92 MG/DL (ref 65–100)
POTASSIUM SERPL-SCNC: 4.5 MMOL/L (ref 3.5–5.1)
PROT SERPL-MCNC: 7.9 G/DL (ref 6.4–8.2)
SODIUM SERPL-SCNC: 140 MMOL/L (ref 136–145)
TSH SERPL DL<=0.05 MIU/L-ACNC: 3.41 UIU/ML (ref 0.36–3.74)

## 2021-05-18 NOTE — PROGRESS NOTES
Called pt on 5/25 to confirm dose of Carvedilol. It was 25 mg BID.   This has been re-sent to pharmacy

## 2021-05-25 RX ORDER — CARVEDILOL 25 MG/1
25 TABLET ORAL 2 TIMES DAILY WITH MEALS
Qty: 60 TABLET | Refills: 3 | Status: SHIPPED | OUTPATIENT
Start: 2021-05-25 | End: 2022-03-15

## 2021-05-27 ENCOUNTER — DOCUMENTATION ONLY (OUTPATIENT)
Dept: FAMILY MEDICINE CLINIC | Age: 66
End: 2021-05-27

## 2021-06-04 NOTE — PROGRESS NOTES
PA request for Carvedilol 12.5 mg tablet Forms completed and faxed by 05/27/2021 to   [place] ([facility])- (f: [phone]    *Per Antoni Farnsworth new RX was sent on 05/25/2021    Confirmation: OK

## 2021-06-10 ENCOUNTER — TELEPHONE (OUTPATIENT)
Dept: FAMILY MEDICINE CLINIC | Age: 66
End: 2021-06-10

## 2021-06-10 NOTE — TELEPHONE ENCOUNTER
----- Message from Polo Pierre sent at 6/10/2021  3:05 PM EDT -----  Regarding: Dr. Mike Dhaliwal first and last name: N/A      Reason for call: Medication status. Callback required yes/no and why: yes, when Rx is called in. Best contact number(s): [phone]      Details to clarify the request: Pt is checking status of her medication \"Bupropion\" 300mg.       Polo Pierre

## 2021-06-11 ENCOUNTER — TELEPHONE (OUTPATIENT)
Dept: FAMILY MEDICINE CLINIC | Age: 66
End: 2021-06-11

## 2021-06-11 NOTE — TELEPHONE ENCOUNTER
----- Message from Cara Soriano sent at 6/11/2021 12:06 PM EDT -----  Regarding: Dr. Gurjit Snow Telephone  Patient return call    Caller's first and last name and relationship (if not the patient): N/A      Best contact number(s): [phone]      Whose call is being returned: \"Bennie\"      Details to clarify the request: Regarding medication.       Cara Soriano

## 2021-06-11 NOTE — TELEPHONE ENCOUNTER
Carlitos Gerrad MD  Last visit with Priscilla Cardenas, NP 5/17/2021. Results for orders placed or performed in visit on 05/17/21   TSH 3RD GENERATION   Result Value Ref Range    TSH 3.41 0.36 - 3.74 uIU/mL   VITAMIN D, 25 HYDROXY   Result Value Ref Range    Vitamin D 25-Hydroxy 19.9 (L) 30 - 040 ng/mL   METABOLIC PANEL, COMPREHENSIVE   Result Value Ref Range    Sodium 140 136 - 145 mmol/L    Potassium 4.5 3.5 - 5.1 mmol/L    Chloride 106 97 - 108 mmol/L    CO2 27 21 - 32 mmol/L    Anion gap 7 5 - 15 mmol/L    Glucose 92 65 - 100 mg/dL    BUN 28 (H) 6 - 20 MG/DL    Creatinine 0.68 0.55 - 1.02 MG/DL    BUN/Creatinine ratio 41 (H) 12 - 20      GFR est AA >60 >60 ml/min/1.73m2    GFR est non-AA >60 >60 ml/min/1.73m2    Calcium 8.9 8.5 - 10.1 MG/DL    Bilirubin, total 0.4 0.2 - 1.0 MG/DL    ALT (SGPT) 20 12 - 78 U/L    AST (SGOT) 16 15 - 37 U/L    Alk.  phosphatase 136 (H) 45 - 117 U/L    Protein, total 7.9 6.4 - 8.2 g/dL    Albumin 3.5 3.5 - 5.0 g/dL    Globulin 4.4 (H) 2.0 - 4.0 g/dL    A-G Ratio 0.8 (L) 1.1 - 2.2       Health Maintenance Due   Topic Date Due    COVID-19 Vaccine (1) Never done    Shingrix Vaccine Age 50> (1 of 2) Never done    Breast Cancer Screen Mammogram  08/19/2016    Colorectal Cancer Screening Combo  10/28/2018    Eye Exam Retinal or Dilated  03/07/2020    Medicare Yearly Exam  12/17/2020    Foot Exam Q1  12/17/2020

## 2021-06-11 NOTE — TELEPHONE ENCOUNTER
Left message on vm to call office back. LM medication currently pending with Dr. Janay Bentley. Please allow 24-48 hours for approval.  1st attempt.

## 2021-06-13 RX ORDER — BUPROPION HYDROCHLORIDE 300 MG/1
TABLET ORAL
Qty: 30 TABLET | Refills: 1 | Status: SHIPPED | OUTPATIENT
Start: 2021-06-13 | End: 2021-08-29

## 2021-06-14 DIAGNOSIS — G62.9 PERIPHERAL POLYNEUROPATHY: ICD-10-CM

## 2021-06-18 RX ORDER — GABAPENTIN 800 MG/1
TABLET ORAL
Qty: 90 TABLET | Refills: 0 | Status: SHIPPED | OUTPATIENT
Start: 2021-06-18 | End: 2021-07-01 | Stop reason: SDUPTHER

## 2021-07-01 ENCOUNTER — OFFICE VISIT (OUTPATIENT)
Dept: NEUROLOGY | Age: 66
End: 2021-07-01
Payer: MEDICARE

## 2021-07-01 VITALS
OXYGEN SATURATION: 92 % | WEIGHT: 230 LBS | RESPIRATION RATE: 18 BRPM | HEIGHT: 64 IN | HEART RATE: 101 BPM | SYSTOLIC BLOOD PRESSURE: 116 MMHG | DIASTOLIC BLOOD PRESSURE: 71 MMHG | BODY MASS INDEX: 39.27 KG/M2

## 2021-07-01 DIAGNOSIS — G62.9 PERIPHERAL POLYNEUROPATHY: ICD-10-CM

## 2021-07-01 PROCEDURE — G9717 DOC PT DX DEP/BP F/U NT REQ: HCPCS | Performed by: PSYCHIATRY & NEUROLOGY

## 2021-07-01 PROCEDURE — G8754 DIAS BP LESS 90: HCPCS | Performed by: PSYCHIATRY & NEUROLOGY

## 2021-07-01 PROCEDURE — G9899 SCRN MAM PERF RSLTS DOC: HCPCS | Performed by: PSYCHIATRY & NEUROLOGY

## 2021-07-01 PROCEDURE — 3017F COLORECTAL CA SCREEN DOC REV: CPT | Performed by: PSYCHIATRY & NEUROLOGY

## 2021-07-01 PROCEDURE — G8752 SYS BP LESS 140: HCPCS | Performed by: PSYCHIATRY & NEUROLOGY

## 2021-07-01 PROCEDURE — G8417 CALC BMI ABV UP PARAM F/U: HCPCS | Performed by: PSYCHIATRY & NEUROLOGY

## 2021-07-01 PROCEDURE — 99204 OFFICE O/P NEW MOD 45 MIN: CPT | Performed by: PSYCHIATRY & NEUROLOGY

## 2021-07-01 PROCEDURE — G8536 NO DOC ELDER MAL SCRN: HCPCS | Performed by: PSYCHIATRY & NEUROLOGY

## 2021-07-01 PROCEDURE — G8427 DOCREV CUR MEDS BY ELIG CLIN: HCPCS | Performed by: PSYCHIATRY & NEUROLOGY

## 2021-07-01 PROCEDURE — 1090F PRES/ABSN URINE INCON ASSESS: CPT | Performed by: PSYCHIATRY & NEUROLOGY

## 2021-07-01 PROCEDURE — 1101F PT FALLS ASSESS-DOCD LE1/YR: CPT | Performed by: PSYCHIATRY & NEUROLOGY

## 2021-07-01 RX ORDER — GABAPENTIN 800 MG/1
TABLET ORAL
Qty: 90 TABLET | Refills: 3 | Status: SHIPPED | OUTPATIENT
Start: 2021-07-01 | End: 2021-11-08

## 2021-07-01 NOTE — PROGRESS NOTES
Chief Complaint   Patient presents with    New Patient    Peripheral Neuropathy     feet, x4 yrs     Burning and numbness at toes and balls of feet

## 2021-07-01 NOTE — PATIENT INSTRUCTIONS
RESULT POLICY      If we have ordered testing for you, know that; \"NO NEWS IS GOOD NEWS! \"     It is our policy that we no longer call patients with results, nor do we  give test results over the phone. We schedule follow up appointments so that your results can be discussed in person. This allows you to address any questions you have regarding the results. If you choose to go to an imaging center outside of [facility], it is your responsibility to bring imaging report and disc to follow up appointment. If something of concern is revealed on your test, we will contact you to discuss the matter and if needed schedule a sooner follow up appointment. Additionally, results may be found by using the My Chart feature and one of our patient service representatives at the  can give you instructions on how to access this feature to utilize our electronic medical record system. Thank you for your understanding. [facility]   Statement to Patients  April 1, 2014      In an effort to ensure the large volume of patient prescription refills is processed in the most efficient and expeditious manner, we are asking our patients to assist us by calling your Pharmacy for all prescription refills, this will include also your  Mail Order Pharmacy. The pharmacy will contact our office electronically to continue the refill process. Please do not wait until the last minute to call your pharmacy. We need at least 48 hours (2days) to fill prescriptions. We also encourage you to call your pharmacy before going to  your prescription to make sure it is ready. With regard to controlled substance prescription refill requests (narcotic refills) that need to be picked up at our office, we ask your cooperation by providing us with at least 72 hours (3days) notice that you will need a refill.     We will not refill narcotic prescription refill requests after 4:00pm on any weekday, Monday through Thursday, or after 2:00pm on Fridays, or on the weekends. We encourage everyone to explore another way of getting your prescription refill request processed using [employer] for Smart[employer], our patient web portal through our electronic medical record system. [employer] Display for Smartphones is an efficient and effective way to communicate your medication request directly to the office and  downloadable as an michelle on your smart phone . [employer] Smart[employer] also features a review functionality that allows you to view your medication list as well as leave messages for your physician. Are you ready to get connected? If so please review the attatched instructions or speak to any of our staff to get you set up right away! Thank you so much for your cooperation. Should you have any questions please contact our Practice Administrator.

## 2021-07-07 ENCOUNTER — DOCUMENTATION ONLY (OUTPATIENT)
Dept: FAMILY MEDICINE CLINIC | Age: 66
End: 2021-07-07

## 2021-07-12 NOTE — PROGRESS NOTES
Rx for Carvedilol 12.5 mg tablet Forms completed and faxed by 07/07/2021 to   [place]- (f): [phone]    Confirmation: OK

## 2021-08-03 ENCOUNTER — TELEPHONE (OUTPATIENT)
Dept: FAMILY MEDICINE CLINIC | Age: 66
End: 2021-08-03

## 2021-08-03 NOTE — TELEPHONE ENCOUNTER
----- Message from Milo Santos sent at 8/3/2021  9:03 AM EDT -----  Regarding: /telephone  Contact: [phone]  General Message/Vendor Calls    Caller's first and last name: Moncho Cheatham with [address]      Reason for call: She needs to verify that she has diabetes to qualify for the special needs plan.        Callback required yes/no and why: Yes      Best contact number(s): [phone]      Details to clarify the request: Case number [id number]      Milo Santos

## 2021-08-03 NOTE — TELEPHONE ENCOUNTER
Spoke with [employer] to confirm patient dx    Controlled type 2 diabetes mellitus with complication, without long-term current use of insulin ([address]    Type 2 diabetes with nephropathy ([address]

## 2021-08-20 ENCOUNTER — TELEPHONE (OUTPATIENT)
Dept: FAMILY MEDICINE CLINIC | Age: 66
End: 2021-08-20

## 2021-08-20 NOTE — TELEPHONE ENCOUNTER
Pankaj verification of chronic condition sent via mail to address on file. Form not signed due to patient needs to sign form to give Dr. Jesús Guillaume permission to share info.

## 2021-08-29 RX ORDER — BUPROPION HYDROCHLORIDE 300 MG/1
TABLET ORAL
Qty: 30 TABLET | Refills: 1 | Status: SHIPPED | OUTPATIENT
Start: 2021-08-29 | End: 2021-11-05 | Stop reason: SDUPTHER

## 2021-10-12 ENCOUNTER — PATIENT MESSAGE (OUTPATIENT)
Dept: FAMILY MEDICINE CLINIC | Age: 66
End: 2021-10-12

## 2021-10-15 ENCOUNTER — TELEPHONE (OUTPATIENT)
Dept: FAMILY MEDICINE CLINIC | Age: 66
End: 2021-10-15

## 2021-10-19 NOTE — TELEPHONE ENCOUNTER
PA Case: [id number], Status: Approved, Coverage Starts on: 1/1/2021 12:00:00 AM, Coverage Ends on: 12/31/2022 12:00:00 AM

## 2021-11-05 ENCOUNTER — VIRTUAL VISIT (OUTPATIENT)
Dept: FAMILY MEDICINE CLINIC | Age: 66
End: 2021-11-05
Payer: MEDICARE

## 2021-11-05 DIAGNOSIS — F33.2 SEVERE EPISODE OF RECURRENT MAJOR DEPRESSIVE DISORDER, WITHOUT PSYCHOTIC FEATURES (HCC): ICD-10-CM

## 2021-11-05 DIAGNOSIS — E03.9 ACQUIRED HYPOTHYROIDISM: ICD-10-CM

## 2021-11-05 DIAGNOSIS — E11.21 TYPE 2 DIABETES WITH NEPHROPATHY (HCC): ICD-10-CM

## 2021-11-05 DIAGNOSIS — I10 ESSENTIAL HYPERTENSION: Primary | ICD-10-CM

## 2021-11-05 DIAGNOSIS — E55.9 VITAMIN D DEFICIENCY: ICD-10-CM

## 2021-11-05 DIAGNOSIS — M05.79 SEROPOSITIVE RHEUMATOID ARTHRITIS OF MULTIPLE SITES (HCC): ICD-10-CM

## 2021-11-05 DIAGNOSIS — E66.01 SEVERE OBESITY (BMI 35.0-35.9 WITH COMORBIDITY) (HCC): ICD-10-CM

## 2021-11-05 PROCEDURE — G8417 CALC BMI ABV UP PARAM F/U: HCPCS | Performed by: FAMILY MEDICINE

## 2021-11-05 PROCEDURE — 2022F DILAT RTA XM EVC RTNOPTHY: CPT | Performed by: FAMILY MEDICINE

## 2021-11-05 PROCEDURE — 99214 OFFICE O/P EST MOD 30 MIN: CPT | Performed by: FAMILY MEDICINE

## 2021-11-05 PROCEDURE — 1090F PRES/ABSN URINE INCON ASSESS: CPT | Performed by: FAMILY MEDICINE

## 2021-11-05 PROCEDURE — G9717 DOC PT DX DEP/BP F/U NT REQ: HCPCS | Performed by: FAMILY MEDICINE

## 2021-11-05 PROCEDURE — G8427 DOCREV CUR MEDS BY ELIG CLIN: HCPCS | Performed by: FAMILY MEDICINE

## 2021-11-05 PROCEDURE — 3017F COLORECTAL CA SCREEN DOC REV: CPT | Performed by: FAMILY MEDICINE

## 2021-11-05 PROCEDURE — 3046F HEMOGLOBIN A1C LEVEL >9.0%: CPT | Performed by: FAMILY MEDICINE

## 2021-11-05 PROCEDURE — G8536 NO DOC ELDER MAL SCRN: HCPCS | Performed by: FAMILY MEDICINE

## 2021-11-05 PROCEDURE — G9899 SCRN MAM PERF RSLTS DOC: HCPCS | Performed by: FAMILY MEDICINE

## 2021-11-05 PROCEDURE — 1101F PT FALLS ASSESS-DOCD LE1/YR: CPT | Performed by: FAMILY MEDICINE

## 2021-11-05 PROCEDURE — G8756 NO BP MEASURE DOC: HCPCS | Performed by: FAMILY MEDICINE

## 2021-11-05 RX ORDER — LEVOTHYROXINE SODIUM 125 UG/1
125 TABLET ORAL
Qty: 30 TABLET | Refills: 2 | Status: SHIPPED | OUTPATIENT
Start: 2021-11-05 | End: 2022-03-15

## 2021-11-05 RX ORDER — BUPROPION HYDROCHLORIDE 300 MG/1
300 TABLET ORAL DAILY
Qty: 30 TABLET | Refills: 1 | Status: SHIPPED | OUTPATIENT
Start: 2021-11-05 | End: 2022-03-15

## 2021-11-05 NOTE — PROGRESS NOTES
Crescencio Jiang is a 77 y.o. female who was seen by synchronous (real-time) audio-video technology on 11/5/2021 for Diabetes, Medication Refill, Hypertension, Thyroid Problem, Vitamin D Deficiency, and Follow-up    She says she is now in a wheelchair because of neuropathy in her feet  She is not able to balance herself  Her sugar is now controlled      Assessment & Plan:   Diagnoses and all orders for this visit:    1. Essential hypertension  -     METABOLIC PANEL, COMPREHENSIVE; Future    2. Severe episode of recurrent major depressive disorder, without psychotic features ([facility]  -     buPROPion XL (WELLBUTRIN XL) 300 mg XL tablet; Take 1 Tablet by mouth daily. 3. Acquired hypothyroidism  -     TSH 3RD GENERATION; Future  -     levothyroxine (SYNTHROID) 125 mcg tablet; Take 1 Tablet by mouth Daily (before breakfast). 4. Type 2 diabetes with nephropathy (HCC)  -     METABOLIC PANEL, COMPREHENSIVE; Future  -     HEMOGLOBIN A1C WITH EAG; Future    5. Vitamin D deficiency  -     VITAMIN D, 25 HYDROXY; Future    6. Severe obesity (BMI 35.0-35.9 with comorbidity) ([facility]    7. Seropositive rheumatoid arthritis of multiple sites [facility])            Subjective:       Prior to Admission medications    Medication Sig Start Date End Date Taking? Authorizing Provider   levothyroxine (SYNTHROID) 125 mcg tablet Take 1 Tablet by mouth Daily (before breakfast). 11/5/21  Yes Erika Norton MD   buPROPion XL (WELLBUTRIN XL) 300 mg XL tablet Take 1 Tablet by mouth daily. 11/5/21  Yes Erika Norton MD   carvediloL (COREG) 25 mg tablet Take 1 Tablet by mouth two (2) times daily (with meals). Indications: high blood pressure 5/25/21  Yes Amina Adams NP   ergocalciferol (ERGOCALCIFEROL) 1,250 mcg (50,000 unit) capsule Take 1 Cap by mouth every seven (7) days.  5/17/21  Yes Amina Adams NP   glucose blood VI test strips (FreeStyle Test) strip CHECK BLOOD SUGAR DAILY E11.8 2/11/21  Yes Erika Norton MD   metFORMIN (GLUCOPHAGE) 500 mg tablet TAKE 1 TABLET BY MOUTH TWICE DAILY WITH MEALS 3/12/20  Yes Maura Mckeon MD   mirtazapine (REMERON) 15 mg tablet TK 1 T PO QD BEFORE BEDTIME 11/6/19  Yes Provider, Historical   atorvastatin (LIPITOR) 40 mg tablet TAKE 1 TABLET BY MOUTH DAILY 9/10/19  Yes Maura Mckeon MD   lancets (FREESTYLE LANCETS) 28 gauge misc CHECK BLOOD SUGAR ONCE DAILY 10/25/18  Yes Maura Mckeon MD   Blood-Glucose Meter (BLOOD GLUCOSE MONITORING) monitoring kit E11.8  To check blood sugar daily 8/28/17  Yes Maura Mckeon MD   gabapentin (NEURONTIN) 800 mg tablet TAKE 1 TABLET BY MOUTH THREE TIMES DAILY.  MAXIMUM DAILY DOSE IS 3 TABLETS 11/8/21   Epps, Mikell Dakins, MD     Patient Active Problem List    Diagnosis Date Noted    Age-related osteoporosis without current pathological fracture 11/27/2019    Type 2 diabetes with nephropathy ([facility] 08/22/2018    Long-term use of immunosuppressant medication 10/24/2017    Morbid obesity with BMI of 45.0-49.9, adult ([facility] 01/17/2017    Vitamin D deficiency 01/17/2017    Osteopenia 01/17/2017    Severe episode of recurrent major depressive disorder, without psychotic features ([address] 01/17/2017    Controlled type 2 diabetes mellitus with complication, without long-term current use of insulin ([address] 11/09/2016    Long term (current) use of systemic steroids 11/03/2016    Long term current use of immunosuppressive drug 11/03/2016    Essential hypertension 11/03/2016    Hypercholesterolemia 11/19/2015    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 30 Tablet 2    buPROPion XL (WELLBUTRIN XL) 300 mg XL tablet Take 1 Tablet by mouth daily. 30 Tablet 1    carvediloL (COREG) 25 mg tablet Take 1 Tablet by mouth two (2) times daily (with meals). Indications: high blood pressure 60 Tablet 3    ergocalciferol (ERGOCALCIFEROL) 1,250 mcg (50,000 unit) capsule Take 1 Cap by mouth every seven (7) days. 8 Cap 0    glucose blood VI test strips (FreeStyle Test) strip CHECK BLOOD SUGAR DAILY E11.8 100 Strip 3    metFORMIN (GLUCOPHAGE) 500 mg tablet TAKE 1 TABLET BY MOUTH TWICE DAILY WITH MEALS 60 Tab 0    mirtazapine (REMERON) 15 mg tablet TK 1 T PO QD BEFORE BEDTIME  2    atorvastatin (LIPITOR) 40 mg tablet TAKE 1 TABLET BY MOUTH DAILY 30 Tab 0    lancets (FREESTYLE LANCETS) 28 gauge misc CHECK BLOOD SUGAR ONCE DAILY 100 Lancet 3    Blood-Glucose Meter (BLOOD GLUCOSE MONITORING) monitoring kit E11.8  To check blood sugar daily 1 Kit 0    gabapentin (NEURONTIN) 800 mg tablet TAKE 1 TABLET BY MOUTH THREE TIMES DAILY.  MAXIMUM DAILY DOSE IS 3 TABLETS 90 Tablet 5       ROS    Objective:     Patient-Reported Vitals 11/5/2021   Patient-Reported Weight 220lb        [INSTRUCTIONS:  \"[x]\" Indicates a positive item  \"[]\" Indicates a negative item  -- DELETE ALL ITEMS NOT EXAMINED]    Constitutional: [x] Appears well-developed and well-nourished [x] No apparent distress      [] Abnormal -     Mental status: [x] Alert and awake  [x] Oriented to person/place/time [x] Able to follow commands    [] Abnormal -     Eyes:   EOM    [x]  Normal    [] Abnormal -   Sclera  [x]  Normal    [] Abnormal -          Discharge [x]  None visible   [] Abnormal -     HENT: [x] Normocephalic, atraumatic  [] Abnormal -   [x] Mouth/Throat: Mucous membranes are moist    External Ears [x] Normal  [] Abnormal -    Neck: [x] No visualized mass [] Abnormal -     Pulmonary/Chest: [x] Respiratory effort normal   [x] No visualized signs of difficulty breathing or respiratory distress        [] Abnormal -      Musculoskeletal:   [x] Normal gait with no signs of ataxia         [x] Normal range of motion of neck        [] Abnormal -     Neurological:        [x] No Facial Asymmetry (Cranial nerve 7 motor function) (limited exam due to video visit)          [x] No gaze palsy        [] Abnormal -          Skin:        [x] No significant exanthematous lesions or discoloration noted on facial skin         [] Abnormal -            Psychiatric:       [x] Normal Affect [] Abnormal -        [x] No Hallucinations    Other pertinent observable physical exam findings:-        We discussed the expected course, resolution and complications of the diagnosis(es) in detail. Medication risks, benefits, costs, interactions, and alternatives were discussed as indicated. I advised her to contact the office if her condition worsens, changes or fails to improve as anticipated. She expressed understanding with the diagnosis(es) and plan. Ileana Johnson, was evaluated through a synchronous (real-time) audio-video encounter. The patient (or guardian if applicable) is aware that this is a billable service. Verbal consent to proceed has been obtained within the past 12 months. The visit was conducted pursuant to the emergency declaration under the [facility], [address] [place] [address] authority and the [employer] [employer] Act. Patient identification was verified, and a caregiver was present when appropriate. The patient was located in a state where the provider was credentialed to provide care.       Odin Barton MD

## 2021-11-05 NOTE — PROGRESS NOTES
1. Have you been to the ER, urgent care clinic since your last visit? Hospitalized since your last visit? No    2. Have you seen or consulted any other health care providers outside of the [address] [place] [address] since your last visit? Include any pap smears or colon screening. No    Chief Complaint   Patient presents with    Diabetes    Medication Refill    Hypertension    Thyroid Problem    Vitamin D Deficiency     Health Maintenance Due   Topic Date Due    COVID-19 Vaccine (1) Never done    Shingrix Vaccine Age 50> (1 of 2) Never done    Breast Cancer Screen Mammogram  08/19/2016    Colorectal Cancer Screening Combo  10/28/2018    Eye Exam Retinal or Dilated  03/07/2020    Medicare Yearly Exam  12/17/2020    Foot Exam Q1  12/17/2020    Flu Vaccine (1) 09/01/2021    A1C test (Diabetic or Prediabetic)  09/04/2021    MICROALBUMIN Q1  09/04/2021    Lipid Screen  09/04/2021     3 most recent PHQ Screens 7/23/2015   Little interest or pleasure in doing things Nearly every day   Feeling down, depressed, irritable, or hopeless Nearly every day   Total Score PHQ 2 6     Abuse Screening Questionnaire 11/5/2021   Do you ever feel afraid of your partner? N   Are you in a relationship with someone who physically or mentally threatens you? N   Is it safe for you to go home? Y     Learning Assessment 11/27/2019   PRIMARY LEARNER Patient   HIGHEST LEVEL OF EDUCATION - PRIMARY LEARNER  -   BARRIERS PRIMARY LEARNER -   CO-LEARNER CAREGIVER No   PRIMARY LANGUAGE ENGLISH    NEED -   LEARNER PREFERENCE PRIMARY DEMONSTRATION   LEARNING SPECIAL TOPICS -   ANSWERED BY patient    RELATIONSHIP SELF   ASSESSMENT COMMENT -     Fall Risk Assessment, last 12 mths 11/5/2021   Able to walk? Yes   Fall in past 12 months? 1   Do you feel unsteady? 0   Are you worried about falling 0   Is the gait abnormal? 0   Number of falls in past 12 months 1   Fall with injury?  1

## 2021-11-07 DIAGNOSIS — G62.9 PERIPHERAL POLYNEUROPATHY: ICD-10-CM

## 2021-11-08 ENCOUNTER — OFFICE VISIT (OUTPATIENT)
Dept: NEUROLOGY | Age: 66
End: 2021-11-08
Payer: MEDICARE

## 2021-11-08 DIAGNOSIS — G62.9 PERIPHERAL POLYNEUROPATHY: Primary | ICD-10-CM

## 2021-11-08 DIAGNOSIS — M54.16 BILATERAL LUMBAR RADICULOPATHY: ICD-10-CM

## 2021-11-08 PROCEDURE — 95886 MUSC TEST DONE W/N TEST COMP: CPT | Performed by: PSYCHIATRY & NEUROLOGY

## 2021-11-08 PROCEDURE — 95911 NRV CNDJ TEST 9-10 STUDIES: CPT | Performed by: PSYCHIATRY & NEUROLOGY

## 2021-11-08 RX ORDER — GABAPENTIN 800 MG/1
TABLET ORAL
Qty: 90 TABLET | Refills: 5 | Status: SHIPPED | OUTPATIENT
Start: 2021-11-08 | End: 2021-11-10 | Stop reason: SDUPTHER

## 2021-11-08 NOTE — PROCEDURES
EMG/ NCS Report  DR[facility] REHABILITATION  - [facility]  [facility]  [facility], [address]   [address]   Ph: [phone]   FAX: [phone]/ [phone]    Test Date:  2021    Patient: Cydney Miller : 1955 Physician: Luisa Purcell MD   Sex: Female Height:  cm Ref Phys: Carlitos Keyes MD   ID#: [id number] Weight:  lbs. Technician: Nubia Cannon     Patient Complaints:    CC: Bilateral feet numbness, pain, swelling; evaluate for peripheral neuropathy     EMG & NCV Findings:  Evaluation of the Right tibial motor nerve showed decreased conduction velocity (Knee-Ankle, 38 m/s). The Left Sup Fibular sensory, the Left sural sensory, and the Right sural sensory nerves showed reduced amplitude (L3.1, L3.7, R3.5 µV). All remaining nerves (as indicated in the following tables) were within normal limits. All F Wave latencies were within normal limits. The muscle scoring table definition stored in the current test does not match the sentence generator setup. The sentence could not be generated. EMG lumbar paraspinal muscles not performed due to prior lumbar laminectomy    Summary:      Mild reduced amplitude of bilateral sural sensory responses and left superficial peroneal sensory response. Right superficial peroneal sensory response shows normal amplitude. All lower extremity since responses show normal peak latencies. Lower extremity motor responses show normal amplitude, normal onset latencies, and normal conduction velocity with the exception of the right tibial motor response which shows borderline (minimally) slowed conduction velocity. 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 EMG reveals chronic, bilateral S1 greater than L5 lumbar radiculopathy (left more than right).       ___________________________  Luisa Purcell M.D.     Nerve Conduction Studies  Anti Sensory Summary Table     Site NR Peak (ms) Norm Peak (ms) P-T Amp (µV) Norm P-T Amp Site1 Site2 Dist (cm)   Left Sup Fibular Anti Sensory (Lat ankle)  32°C   Lower leg    2.3 <4.6 3.1 >4 Lower leg Lat ankle 10.0   Site 2    2.2  2.1       Site 3    2.3  4.3       Right Sup Fibular Anti Sensory (Lat ankle)  33.1°C   Lower leg    2.6 <4.6 5.7 >4 Lower leg Lat ankle 10.0   Site 2    2.4  6.8           2.3  12.8       Left Sural Anti Sensory (Lat Mall)  31.4°C   Calf    3.8 <4.5 3.7 >4.0 Calf Lat Mall 14.0   Site 2    3.7  5.7       Site 3    3.4  7.2       Right Sural Anti Sensory (Lat Mall)  32.4°C   Calf    3.1 <4.5 3.5 >4.0 Calf Lat Mall 14.0   Site 2    3.0  3.1       Site 3    3.0  2.8         Motor Summary Table     Site NR Onset (ms) Norm Onset (ms) O-P Amp (mV) Norm O-P Amp Neg Area% (1st) Site1 Site2 Dist (cm) Anmol (m/s) Norm Anmol (m/s)   Left Fibular Motor (Ext Dig Brev)  32.4°C   Ankle    4.3 <6.5 3.5 >1.1  Ankle Ext Dig Brev 8.0     B Fib    11.5  3.3   B Fib Ankle 30.0 42 >38   Poplt    13.2  3.4   Poplt B Fib 10.0 59 >42   Right Fibular Motor (Ext Dig Brev)  33.1°C   Ankle    4.8 <6.5 2.6 >1.1  Ankle Ext Dig Brev 8.0     B Fib    12.3  2.5   B Fib Ankle 29.0 39 >38   Poplt    13.8  2.4   Poplt B Fib 10.0 67 >42   Left Tibial Motor (Abd Ku Brev)  32.4°C   Ankle    4.0 <6.1 4.2 >1.1  Ankle Abd Ku Brev 8.0     Knee    12.8  2.8   Knee Ankle 35.0 40 >39   Right Tibial Motor (Abd Ku Brev)  33.1°C   Ankle    4.5 <6.1 4.3 >1.1  Ankle Abd Ku Brev 8.0     Knee    13.4  3.1   Knee Ankle 34.0 38 >39     F Wave Studies     NR F-Lat (ms) Lat Norm (ms) L-R F-Lat (ms) L-R Lat Norm   Left Tibial (Mrkrs) (Abd Hallucis)  32.4°C      51.91 <61 0.00 <5.7   Right Tibial (Mrkrs) (Abd Hallucis)  33.1°C      51.91 <61 0.00 <5.7     H Reflex Studies     NR H-Lat (ms) L-R H-Lat (ms) L-R Lat Norm   Left Tibial (Gastroc)  32.4°C      40.00 0.00 <2.0   Right Tibial (Gastroc)  32.8°C      40.00 0.00 <2.0     EMG     Side Muscle Nerve Root Ins Act Fibs Psw Recrt Amp Dur Poly Comment   Left AntTibialis Dp Br Peron L4-5 Nml Nml Nml [address] 1+    Left Gastroc Tibial S1-2 Nml Nml Nml [employer] 0    Left GluteusMed SupGluteal L5-S1 Nml Nml [address] 0    Left Ext Dig Brev Dp Br Peron L5, S1 Nml Nml Nml Nml Nml Nml 0    Left VastusMed Femoral L2-4 Nml Nml Nml Nml Nml Nml 0    Left GluteusMax InfGluteal L5-S2 Nml Nml Nml Nml Nml Nml 0    Right VastusMed Femoral L2-4 Nml Nml Nml [employer] 0 mild reduced   Right AntTibialis Dp Br Peron L4-5 Nml Nml Nml Nml Nml Inc 0    Right GluteusMax InfGluteal L5-S2 Nml Nml [address] 0    Right Gastroc Tibial S1-2 Incr Nml 1+ [employer] 0    Right GluteusMed SupGluteal L5-S1 Nml Nml Nml [employer] 0    Right Ext Dig Brev Dp Br Peron L5, S1 Nml Nml Nml Nml Nml Nml 0      Waveforms:

## 2021-11-10 DIAGNOSIS — G62.9 PERIPHERAL POLYNEUROPATHY: ICD-10-CM

## 2021-11-11 LAB — SARS-COV-2, NAA: POSITIVE

## 2021-11-11 RX ORDER — GABAPENTIN 800 MG/1
TABLET ORAL
Qty: 90 TABLET | Refills: 5 | Status: SHIPPED | OUTPATIENT
Start: 2021-11-11 | End: 2022-02-24 | Stop reason: SDUPTHER

## 2021-12-29 ENCOUNTER — TELEPHONE (OUTPATIENT)
Dept: NEUROLOGY | Age: 66
End: 2021-12-29

## 2021-12-29 NOTE — TELEPHONE ENCOUNTER
Nephew is calling on behalf of patient. She is requesting that her in person appointment for her test results be either a VV or phone call. They are very concerned about \"Omicron\" and don't want to risk bringing her out. I let him know that I would relay the request and that we would call back as soon as we are able. He is aware that this change is up to the provider. He understood.      Thank you

## 2022-01-21 ENCOUNTER — TELEPHONE (OUTPATIENT)
Dept: NEUROLOGY | Age: 67
End: 2022-01-21

## 2022-02-24 ENCOUNTER — VIRTUAL VISIT (OUTPATIENT)
Dept: NEUROLOGY | Age: 67
End: 2022-02-24
Payer: MEDICARE

## 2022-02-24 DIAGNOSIS — M54.16 BILATERAL LUMBAR RADICULOPATHY: ICD-10-CM

## 2022-02-24 DIAGNOSIS — G62.9 PERIPHERAL POLYNEUROPATHY: Primary | ICD-10-CM

## 2022-02-24 PROCEDURE — G8756 NO BP MEASURE DOC: HCPCS | Performed by: NURSE PRACTITIONER

## 2022-02-24 PROCEDURE — 1090F PRES/ABSN URINE INCON ASSESS: CPT | Performed by: NURSE PRACTITIONER

## 2022-02-24 PROCEDURE — 1101F PT FALLS ASSESS-DOCD LE1/YR: CPT | Performed by: NURSE PRACTITIONER

## 2022-02-24 PROCEDURE — G9717 DOC PT DX DEP/BP F/U NT REQ: HCPCS | Performed by: NURSE PRACTITIONER

## 2022-02-24 PROCEDURE — 99214 OFFICE O/P EST MOD 30 MIN: CPT | Performed by: NURSE PRACTITIONER

## 2022-02-24 PROCEDURE — G8427 DOCREV CUR MEDS BY ELIG CLIN: HCPCS | Performed by: NURSE PRACTITIONER

## 2022-02-24 PROCEDURE — 3017F COLORECTAL CA SCREEN DOC REV: CPT | Performed by: NURSE PRACTITIONER

## 2022-02-24 RX ORDER — GABAPENTIN 800 MG/1
800 TABLET ORAL
Qty: 360 TABLET | Refills: 1 | Status: SHIPPED | OUTPATIENT
Start: 2022-02-24 | End: 2022-07-05 | Stop reason: SDUPTHER

## 2022-02-24 NOTE — PROGRESS NOTES
Kylee Beckham is a 74 y.o. female who was seen by synchronous (real-time) audio-video technology on 2022 for Peripheral Neuropathy    Assessment & Plan:   Diagnoses and all orders for this visit:    1. Peripheral polyneuropathy  -     gabapentin (NEURONTIN) 800 mg tablet; Take 1 Tablet by mouth four (4) times daily as needed for Pain. Max Daily Amount: 3,200 mg.    2. Bilateral lumbar radiculopathy      Discussed peripheral neuropathy and chronic lumbar radiculopathy. Since she seems to do well with taking the gabapetin four times a day when she is up longer. Increased dosing schedule. Recommended that she use lidocaine topical as needed. Follow up in three months        Subjective: Follow up for peripheral neuropathy. At her initial visit with Dr. Eulogio Laguerre, she was sent for an EMG of her lower extremities which demonstrated a peripheral neuropathy, probably on the basis of her diabetes. She is currently taking gabapentin 800mg tid for symptomatic control. She indicates that this is not covering her discomfort and she has found that she does better if she takes this four times a day but will run out if she does this. Recap from initial visit:  67-year-old lady with diabetes and symptoms that are suggestive of peripheral neuropathy although she has not had a EMG for formal diagnosis and to exclude other etiologies  EMG of the bilateral lower extremities  Neurontin refilled  Follow-up after      EMG/ NCS Report  [facility] - INPATIENT  [facility]  [place],  [address], [address]   Ph: [phone]   FAX: [phone]/ [phone]    Test Date:  2021    Patient: Khalida Martínez : 1955 Physician: Tennille Childress MD   Sex: Female Height:  cm Ref Phys: Marko Mishra MD   ID#: [id number] Weight:  lbs. Technician: Keri Khalil     Patient Complaints:    CC:  Bilateral feet numbness, pain, swelling; evaluate for peripheral neuropathy     EMG & NCV Findings:  Evaluation of the Right tibial motor nerve showed decreased conduction velocity (Knee-Ankle, 38 m/s). The Left Sup Fibular sensory, the Left sural sensory, and the Right sural sensory nerves showed reduced amplitude (L3.1, L3.7, R3.5 µV). All remaining nerves (as indicated in the following tables) were within normal limits. All F Wave latencies were within normal limits. The muscle scoring table definition stored in the current test does not match the sentence generator setup. The sentence could not be generated. EMG lumbar paraspinal muscles not performed due to prior lumbar laminectomy    Summary:      Mild reduced amplitude of bilateral sural sensory responses and left superficial peroneal sensory response. Right superficial peroneal sensory response shows normal amplitude. All lower extremity since responses show normal peak latencies. Lower extremity motor responses show normal amplitude, normal onset latencies, and normal conduction velocity with the exception of the right tibial motor response which shows borderline (minimally) slowed conduction velocity. 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 EMG reveals chronic, bilateral S1 greater than L5 lumbar radiculopathy (left more than right).       ___________________________  Jammie Solorio M.D.     Nerve Conduction Studies  Anti Sensory Summary Table     Site NR Peak (ms) Norm Peak (ms) P-T Amp (µV) Norm P-T Amp Site1 Site2 Dist (cm)   Left Sup Fibular Anti Sensory (Lat ankle)  32°C   Lower leg    2.3 <4.6 3.1 >4 Lower leg Lat ankle 10.0   Site 2    2.2  2.1       Site 3    2.3  4.3       Right Sup Fibular Anti Sensory (Lat ankle)  33.1°C   Lower leg    2.6 <4.6 5.7 >4 Lower leg Lat ankle 10.0   Site 2    2.4  6.8           2.3  12.8       Left Sural Anti Sensory (Lat Mall)  31.4°C   Calf    3.8 <4.5 3.7 >4.0 Calf Lat Mall 14.0   Site 2    3.7  5.7 Site 3    3.4  7.2       Right Sural Anti Sensory (Lat Mall)  32.4°C   Calf    3.1 <4.5 3.5 >4.0 Calf Lat Mall 14.0   Site 2    3.0  3.1       Site 3    3.0  2.8         Motor Summary Table     Site NR Onset (ms) Norm Onset (ms) O-P Amp (mV) Norm O-P Amp Neg Area% (1st) Site1 Site2 Dist (cm) Anmol (m/s) Norm Anmol (m/s)   Left Fibular Motor (Ext Dig Brev)  32.4°C   Ankle    4.3 <6.5 3.5 >1.1  Ankle Ext Dig Brev 8.0     B Fib    11.5  3.3   B Fib Ankle 30.0 42 >38   Poplt    13.2  3.4   Poplt B Fib 10.0 59 >42   Right Fibular Motor (Ext Dig Brev)  33.1°C   Ankle    4.8 <6.5 2.6 >1.1  Ankle Ext Dig Brev 8.0     B Fib    12.3  2.5   B Fib Ankle 29.0 39 >38   Poplt    13.8  2.4   Poplt B Fib 10.0 67 >42   Left Tibial Motor (Abd Ku Brev)  32.4°C   Ankle    4.0 <6.1 4.2 >1.1  Ankle Abd Ku Brev 8.0     Knee    12.8  2.8   Knee Ankle 35.0 40 >39   Right Tibial Motor (Abd Ku Brev)  33.1°C   Ankle    4.5 <6.1 4.3 >1.1  Ankle Abd Ku Brev 8.0     Knee    13.4  3.1   Knee Ankle 34.0 38 >39     F Wave Studies     NR F-Lat (ms) Lat Norm (ms) L-R F-Lat (ms) L-R Lat Norm   Left Tibial (Mrkrs) (Abd Hallucis)  32.4°C      51.91 <61 0.00 <5.7   Right Tibial (Mrkrs) (Abd Hallucis)  33.1°C      51.91 <61 0.00 <5.7     H Reflex Studies     NR H-Lat (ms) L-R H-Lat (ms) L-R Lat Norm   Left Tibial (Gastroc)  32.4°C      40.00 0.00 <2.0   Right Tibial (Gastroc)  32.8°C      40.00 0.00 <2.0     EMG     Side Muscle Nerve Root Ins Act Fibs Psw Recrt Amp Dur Poly Comment   Left AntTibialis Dp Br Peron L4-5 Nml Nml Nml [address] 1+    Left Gastroc Tibial S1-2 Nml Nml Nml [employer] 0    Left GluteusMed SupGluteal L5-S1 Nml Nml [address] 0    Left Ext Dig Brev Dp Br Peron L5, S1 Nml Nml Nml Nml Nml Nml 0    Left VastusMed Femoral L2-4 Nml Nml Nml Nml Nml Nml 0    Left GluteusMax InfGluteal L5-S2 Nml Nml Nml Nml Nml Nml 0    Right VastusMed Femoral L2-4 Nml Nml Nml [employer] 0 mild reduced   Right AntTibialis Dp Br Peron L4-5 Nml Nml Nml Nml Nml Inc 0    Right GluteusMax InfGluteal L5-S2 Nml Nml [address] 0    Right Gastroc Tibial S1-2 Incr Nml 1+ [employer] 0    Right GluteusMed SupGluteal L5-S1 Nml Nml Nml [employer] 0    Right Ext Dig Brev Dp Br Peron L5, S1 Nml Nml Nml Nml Nml Nml 0      Waveforms:                              Prior to Admission medications    Medication Sig Start Date End Date Taking? Authorizing Provider   gabapentin (NEURONTIN) 800 mg tablet 1 po tid 11/11/21   Kourtney Cassidy MD   levothyroxine (SYNTHROID) 125 mcg tablet Take 1 Tablet by mouth Daily (before breakfast). 11/5/21   Franky Fitch MD   buPROPion XL (WELLBUTRIN XL) 300 mg XL tablet Take 1 Tablet by mouth daily. 11/5/21   Franky iFtch MD   carvediloL (COREG) 25 mg tablet Take 1 Tablet by mouth two (2) times daily (with meals). Indications: high blood pressure 5/25/21   Kal De Guzman NP   ergocalciferol (ERGOCALCIFEROL) 1,250 mcg (50,000 unit) capsule Take 1 Cap by mouth every seven (7) days.  5/17/21   Kal De Guzman NP   glucose blood VI test strips (FreeStyle Test) strip CHECK BLOOD SUGAR DAILY E11.8 2/11/21   Franky Fitch MD   metFORMIN (GLUCOPHAGE) 500 mg tablet TAKE 1 TABLET BY MOUTH TWICE DAILY WITH MEALS 3/12/20   Franky Fitch MD   mirtazapine (REMERON) 15 mg tablet TK 1 T PO QD BEFORE BEDTIME 11/6/19   Provider, Historical   atorvastatin (LIPITOR) 40 mg tablet TAKE 1 TABLET BY MOUTH DAILY 9/10/19   Franky Fitch MD   lancets (FREESTYLE LANCETS) 28 gauge misc CHECK BLOOD SUGAR ONCE DAILY 10/25/18   Franky Fitch MD   Blood-Glucose Meter (BLOOD GLUCOSE MONITORING) monitoring kit E11.8  To check blood sugar daily 8/28/17   Franky Fitch MD         ROS    Objective:     Patient-Reported Vitals 11/5/2021   Patient-Reported Weight 220lb        [INSTRUCTIONS:  \"[x]\" Indicates a positive item  \"[]\" Indicates a negative item  -- DELETE ALL ITEMS NOT EXAMINED]    Constitutional: [x] Appears well-developed and well-nourished [x] No apparent distress      [] Abnormal -     Mental status: [x] Alert and awake  [x] Oriented to person/place/time [x] Able to follow commands    [] Abnormal -     Eyes:   EOM    [x]  Normal    [] Abnormal -   Sclera  [x]  Normal    [] Abnormal -          Discharge [x]  None visible   [] Abnormal -     HENT: [x] Normocephalic, atraumatic  [] Abnormal -   [x] Mouth/Throat: Mucous membranes are moist    External Ears [x] Normal  [] Abnormal -    Neck: [x] No visualized mass [] Abnormal -     Pulmonary/Chest: [x] Respiratory effort normal   [x] No visualized signs of difficulty breathing or respiratory distress        [] Abnormal -      Musculoskeletal:   [x] Normal gait with no signs of ataxia         [x] Normal range of motion of neck        [] Abnormal -     Neurological:        [x] No Facial Asymmetry (Cranial nerve 7 motor function) (limited exam due to video visit)          [x] No gaze palsy        [] Abnormal -          Skin:        [x] No significant exanthematous lesions or discoloration noted on facial skin         [] Abnormal -            Psychiatric:       [x] Normal Affect [] Abnormal -        [x] No Hallucinations    Other pertinent observable physical exam findings:-        We discussed the expected course, resolution and complications of the diagnosis(es) in detail. Medication risks, benefits, costs, interactions, and alternatives were discussed as indicated. I advised her to contact the office if her condition worsens, changes or fails to improve as anticipated. She expressed understanding with the diagnosis(es) and plan. Ileanareginaldo Johnson, was evaluated through a synchronous (real-time) audio-video encounter. The patient (or guardian if applicable) is aware that this is a billable service, which includes applicable co-pays. Verbal consent to proceed has been obtained.  The visit was conducted pursuant to the emergency declaration under the [address] [facility] [address]98 waiver authority and the [employer] General Act. Patient identification was verified, and a caregiver was present when appropriate. The patient was located at home in a state where the provider was licensed to provide care.       Donalynn Ormond, NP

## 2022-03-03 ENCOUNTER — TELEPHONE (OUTPATIENT)
Dept: FAMILY MEDICINE CLINIC | Age: 67
End: 2022-03-03

## 2022-03-07 ENCOUNTER — LAB ONLY (OUTPATIENT)
Dept: FAMILY MEDICINE CLINIC | Age: 67
End: 2022-03-07
Payer: MEDICARE

## 2022-03-07 DIAGNOSIS — E03.9 ACQUIRED HYPOTHYROIDISM: ICD-10-CM

## 2022-03-07 DIAGNOSIS — E55.9 VITAMIN D DEFICIENCY: ICD-10-CM

## 2022-03-07 DIAGNOSIS — E11.21 TYPE 2 DIABETES WITH NEPHROPATHY (HCC): ICD-10-CM

## 2022-03-07 DIAGNOSIS — I10 ESSENTIAL HYPERTENSION: ICD-10-CM

## 2022-03-07 PROCEDURE — 3044F HG A1C LEVEL LT 7.0%: CPT

## 2022-03-08 LAB
25(OH)D3 SERPL-MCNC: 13.4 NG/ML (ref 30–100)
ALBUMIN SERPL-MCNC: 3.2 G/DL (ref 3.5–5)
ALBUMIN/GLOB SERPL: 0.7 {RATIO} (ref 1.1–2.2)
ALP SERPL-CCNC: 121 U/L (ref 45–117)
ALT SERPL-CCNC: 24 U/L (ref 12–78)
ANION GAP SERPL CALC-SCNC: 4 MMOL/L (ref 5–15)
AST SERPL-CCNC: 23 U/L (ref 15–37)
BILIRUB SERPL-MCNC: 0.3 MG/DL (ref 0.2–1)
BUN SERPL-MCNC: 24 MG/DL (ref 6–20)
BUN/CREAT SERPL: 29 (ref 12–20)
CALCIUM SERPL-MCNC: 8.8 MG/DL (ref 8.5–10.1)
CHLORIDE SERPL-SCNC: 103 MMOL/L (ref 97–108)
CO2 SERPL-SCNC: 29 MMOL/L (ref 21–32)
CREAT SERPL-MCNC: 0.82 MG/DL (ref 0.55–1.02)
EST. AVERAGE GLUCOSE BLD GHB EST-MCNC: 146 MG/DL
GLOBULIN SER CALC-MCNC: 4.7 G/DL (ref 2–4)
GLUCOSE SERPL-MCNC: 115 MG/DL (ref 65–100)
HBA1C MFR BLD: 6.7 % (ref 4–5.6)
POTASSIUM SERPL-SCNC: 4.5 MMOL/L (ref 3.5–5.1)
PROT SERPL-MCNC: 7.9 G/DL (ref 6.4–8.2)
SODIUM SERPL-SCNC: 136 MMOL/L (ref 136–145)
TSH SERPL DL<=0.05 MIU/L-ACNC: 6.92 UIU/ML (ref 0.36–3.74)

## 2022-03-13 DIAGNOSIS — E03.9 ACQUIRED HYPOTHYROIDISM: ICD-10-CM

## 2022-03-13 DIAGNOSIS — F33.2 SEVERE EPISODE OF RECURRENT MAJOR DEPRESSIVE DISORDER, WITHOUT PSYCHOTIC FEATURES (HCC): ICD-10-CM

## 2022-03-13 DIAGNOSIS — E55.9 VITAMIN D DEFICIENCY: ICD-10-CM

## 2022-03-13 RX ORDER — ERGOCALCIFEROL 1.25 MG/1
50000 CAPSULE ORAL
Qty: 24 CAPSULE | Refills: 0 | Status: SHIPPED | OUTPATIENT
Start: 2022-03-13 | End: 2022-08-22

## 2022-03-13 NOTE — PROGRESS NOTES
The vit D level is very low. I will send a prescription for a supplement  The blood sugar has increased, are you taking the metformin? The blood sugar was normal a year ago. Avoid sweets and starches and move as much as you can tolerate each day. Drink at least 64 ounces of water each day. I want to recheck in 3 months and start taking the metformin again if you are not taking it  The thyroid level is too low. Were you taking the hormone pill?  If you have been taking it, you will need the dose increased  The kidney and liver tests are normal

## 2022-03-14 ENCOUNTER — PATIENT MESSAGE (OUTPATIENT)
Dept: FAMILY MEDICINE CLINIC | Age: 67
End: 2022-03-14

## 2022-03-14 NOTE — PROGRESS NOTES
Left message on vm to call office back. My chart message sent  Result letter sent via mail to address on file.

## 2022-03-15 RX ORDER — LEVOTHYROXINE SODIUM 125 UG/1
TABLET ORAL
Qty: 30 TABLET | Refills: 2 | Status: SHIPPED | OUTPATIENT
Start: 2022-03-15 | End: 2022-06-30 | Stop reason: SDUPTHER

## 2022-03-15 RX ORDER — BUPROPION HYDROCHLORIDE 300 MG/1
300 TABLET ORAL DAILY
Qty: 30 TABLET | Refills: 1 | Status: SHIPPED | OUTPATIENT
Start: 2022-03-15 | End: 2022-06-15

## 2022-03-18 PROBLEM — Z79.60 LONG-TERM USE OF IMMUNOSUPPRESSANT MEDICATION: Status: ACTIVE | Noted: 2017-10-24

## 2022-03-18 PROBLEM — M81.0 AGE-RELATED OSTEOPOROSIS WITHOUT CURRENT PATHOLOGICAL FRACTURE: Status: ACTIVE | Noted: 2019-11-27

## 2022-03-19 PROBLEM — E55.9 VITAMIN D DEFICIENCY: Status: ACTIVE | Noted: 2017-01-17

## 2022-03-19 PROBLEM — F33.2 SEVERE EPISODE OF RECURRENT MAJOR DEPRESSIVE DISORDER, WITHOUT PSYCHOTIC FEATURES (HCC): Status: ACTIVE | Noted: 2017-01-17

## 2022-03-20 PROBLEM — E11.21 TYPE 2 DIABETES WITH NEPHROPATHY (HCC): Status: ACTIVE | Noted: 2018-08-22

## 2022-03-20 PROBLEM — M85.80 OSTEOPENIA: Status: ACTIVE | Noted: 2017-01-17

## 2022-03-20 PROBLEM — E66.01 MORBID OBESITY WITH BMI OF 45.0-49.9, ADULT (HCC): Status: ACTIVE | Noted: 2017-01-17

## 2022-05-26 ENCOUNTER — VIRTUAL VISIT (OUTPATIENT)
Dept: NEUROLOGY | Age: 67
End: 2022-05-26
Payer: MEDICARE

## 2022-05-26 DIAGNOSIS — M48.062 LUMBAR STENOSIS WITH NEUROGENIC CLAUDICATION: ICD-10-CM

## 2022-05-26 DIAGNOSIS — F33.2 SEVERE EPISODE OF RECURRENT MAJOR DEPRESSIVE DISORDER, WITHOUT PSYCHOTIC FEATURES (HCC): ICD-10-CM

## 2022-05-26 DIAGNOSIS — G62.9 PERIPHERAL POLYNEUROPATHY: Primary | ICD-10-CM

## 2022-05-26 PROCEDURE — 1123F ACP DISCUSS/DSCN MKR DOCD: CPT | Performed by: NURSE PRACTITIONER

## 2022-05-26 PROCEDURE — 99214 OFFICE O/P EST MOD 30 MIN: CPT | Performed by: NURSE PRACTITIONER

## 2022-05-26 PROCEDURE — 3017F COLORECTAL CA SCREEN DOC REV: CPT | Performed by: NURSE PRACTITIONER

## 2022-05-26 PROCEDURE — 1101F PT FALLS ASSESS-DOCD LE1/YR: CPT | Performed by: NURSE PRACTITIONER

## 2022-05-26 PROCEDURE — G9717 DOC PT DX DEP/BP F/U NT REQ: HCPCS | Performed by: NURSE PRACTITIONER

## 2022-05-26 PROCEDURE — 1090F PRES/ABSN URINE INCON ASSESS: CPT | Performed by: NURSE PRACTITIONER

## 2022-05-26 PROCEDURE — G8427 DOCREV CUR MEDS BY ELIG CLIN: HCPCS | Performed by: NURSE PRACTITIONER

## 2022-05-26 PROCEDURE — G8756 NO BP MEASURE DOC: HCPCS | Performed by: NURSE PRACTITIONER

## 2022-05-26 RX ORDER — AMITRIPTYLINE HYDROCHLORIDE 10 MG/1
10 TABLET, FILM COATED ORAL
Qty: 30 TABLET | Refills: 2 | Status: SHIPPED | OUTPATIENT
Start: 2022-05-26 | End: 2022-07-05 | Stop reason: SDUPTHER

## 2022-05-26 RX ORDER — DULOXETIN HYDROCHLORIDE 30 MG/1
30 CAPSULE, DELAYED RELEASE ORAL DAILY
Qty: 30 CAPSULE | Refills: 2 | Status: SHIPPED | OUTPATIENT
Start: 2022-05-26 | End: 2022-07-05 | Stop reason: SDUPTHER

## 2022-05-26 NOTE — PROGRESS NOTES
[address] [facility],5Th [address]  [address]   [place]  [place] [address] [facility], [facility] [address]   [phone] Office   [phone] Fax           Date:  22     Name:  Vidya Cardenas  :  1955  MRN:  [id number]     PCP:  Jennifer Mariano MD    Angelika Cline is a 74 y.o. female who was seen by synchronous (real-time) audio-video technology on 2022 for Peripheral Neuropathy    Subjective: The peripheral neuropathy is affecting her mobility. She is using her wheelchair a lot more often and she does not want to lose her ability to walk. She is using the wheelchair mainly due to the pain and she is finding that since she is not up and about like normal, she is getting a little weak. She did increase the gabapentin slightly as instructed but this really has not helped much. She has not tried using the topical lidocaine. Her anxiety and depression are a little worse as well. Recap from LOV:  Discussed peripheral neuropathy and chronic lumbar radiculopathy. Since she seems to do well with taking the gabapetin four times a day when she is up longer. Increased dosing schedule. Recommended that she use lidocaine topical as needed. Follow up in three months      EMG/ NCS Report  [facility] - INPATIENT  [facility]  [place],  [address], [address]   Ph: [phone]   FAX: [phone]/ [phone]    Test Date:  2021    Patient: Khang Chung : 1955 Physician: Guera Castillo MD   Sex: Female Height:  cm Ref Phys: Raymundo Monroe MD   ID#: [id number] Weight:  lbs. Technician: Arcelia Og     Patient Complaints:    CC: Bilateral feet numbness, pain, swelling; evaluate for peripheral neuropathy     EMG & NCV Findings:  Evaluation of the Right tibial motor nerve showed decreased conduction velocity (Knee-Ankle, 38 m/s).   The Left Sup Fibular sensory, the Left sural sensory, and the Right sural sensory nerves showed reduced amplitude (L3.1, L3.7, R3.5 µV). All remaining nerves (as indicated in the following tables) were within normal limits. All F Wave latencies were within normal limits. The muscle scoring table definition stored in the current test does not match the sentence generator setup. The sentence could not be generated. EMG lumbar paraspinal muscles not performed due to prior lumbar laminectomy    Summary:      Mild reduced amplitude of bilateral sural sensory responses and left superficial peroneal sensory response. Right superficial peroneal sensory response shows normal amplitude. All lower extremity since responses show normal peak latencies. Lower extremity motor responses show normal amplitude, normal onset latencies, and normal conduction velocity with the exception of the right tibial motor response which shows borderline (minimally) slowed conduction velocity. 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 EMG reveals chronic, bilateral S1 greater than L5 lumbar radiculopathy (left more than right).       ___________________________  Oumou Wu M.D.     Nerve Conduction Studies  Anti Sensory Summary Table     Site NR Peak (ms) Norm Peak (ms) P-T Amp (µV) Norm P-T Amp Site1 Site2 Dist (cm)   Left Sup Fibular Anti Sensory (Lat ankle)  32°C   Lower leg    2.3 <4.6 3.1 >4 Lower leg Lat ankle 10.0   Site 2    2.2  2.1       Site 3    2.3  4.3       Right Sup Fibular Anti Sensory (Lat ankle)  33.1°C   Lower leg    2.6 <4.6 5.7 >4 Lower leg Lat ankle 10.0   Site 2    2.4  6.8           2.3  12.8       Left Sural Anti Sensory (Lat Mall)  31.4°C   Calf    3.8 <4.5 3.7 >4.0 Calf Lat Mall 14.0   Site 2    3.7  5.7       Site 3    3.4  7.2       Right Sural Anti Sensory (Lat Mall)  32.4°C   Calf    3.1 <4.5 3.5 >4.0 Calf Lat Mall 14.0   Site 2    3.0  3.1       Site 3    3.0  2.8 Motor Summary Table     Site NR Onset (ms) Norm Onset (ms) O-P Amp (mV) Norm O-P Amp Neg Area% (1st) Site1 Site2 Dist (cm) Anmol (m/s) Norm Anmol (m/s)   Left Fibular Motor (Ext Dig Brev)  32.4°C   Ankle    4.3 <6.5 3.5 >1.1  Ankle Ext Dig Brev 8.0     B Fib    11.5  3.3   B Fib Ankle 30.0 42 >38   Poplt    13.2  3.4   Poplt B Fib 10.0 59 >42   Right Fibular Motor (Ext Dig Brev)  33.1°C   Ankle    4.8 <6.5 2.6 >1.1  Ankle Ext Dig Brev 8.0     B Fib    12.3  2.5   B Fib Ankle 29.0 39 >38   Poplt    13.8  2.4   Poplt B Fib 10.0 67 >42   Left Tibial Motor (Abd Ku Brev)  32.4°C   Ankle    4.0 <6.1 4.2 >1.1  Ankle Abd Ku Brev 8.0     Knee    12.8  2.8   Knee Ankle 35.0 40 >39   Right Tibial Motor (Abd Ku Brev)  33.1°C   Ankle    4.5 <6.1 4.3 >1.1  Ankle Abd Ku Brev 8.0     Knee    13.4  3.1   Knee Ankle 34.0 38 >39     F Wave Studies     NR F-Lat (ms) Lat Norm (ms) L-R F-Lat (ms) L-R Lat Norm   Left Tibial (Mrkrs) (Abd Hallucis)  32.4°C      51.91 <61 0.00 <5.7   Right Tibial (Mrkrs) (Abd Hallucis)  33.1°C      51.91 <61 0.00 <5.7     H Reflex Studies     NR H-Lat (ms) L-R H-Lat (ms) L-R Lat Norm   Left Tibial (Gastroc)  32.4°C      40.00 0.00 <2.0   Right Tibial (Gastroc)  32.8°C      40.00 0.00 <2.0     EMG     Side Muscle Nerve Root Ins Act Fibs Psw Recrt Amp Dur Poly Comment   Left AntTibialis Dp Br Peron L4-5 Nml Nml Nml [address] 1+    Left Gastroc Tibial S1-2 Nml Nml Nml [employer] 0    Left GluteusMed SupGluteal L5-S1 Nml Nml [address] 0    Left Ext Dig Brev Dp Br Peron L5, S1 Nml Nml Nml Nml Nml Nml 0    Left VastusMed Femoral L2-4 Nml Nml Nml Nml Nml Nml 0    Left GluteusMax InfGluteal L5-S2 Nml Nml Nml Nml Nml Nml 0    Right VastusMed Femoral L2-4 Nml Nml Nml [employer] 0 mild reduced   Right AntTibialis Dp Br Peron L4-5 Nml Nml Nml Nml [address] 0    Right GluteusMax InfGluteal L5-S2 Nml Nml [address] 0    Right Gastroc Tibial S1-2 Incr Nml 1+ [employer] 0    Right GluteusMed SupGluteal L5-S1 Nml Nml Nml [employer] 0    Right Ext Dig Brev Dp Br Peron L5, S1 Nml Nml Nml Nml Nml Nml 0      Waveforms:                              Current Outpatient Medications   Medication Sig    buPROPion XL (WELLBUTRIN XL) 300 mg XL tablet TAKE 1 TABLET BY MOUTH DAILY    levothyroxine (SYNTHROID) 125 mcg tablet TAKE 1 TABLET BY MOUTH DAILY BEFORE AND BREAKFAST    carvediloL (COREG) 25 mg tablet TAKE 1 TABLET BY MOUTH TWICE DAILY    ergocalciferol (ERGOCALCIFEROL) 1,250 mcg (50,000 unit) capsule Take 1 Capsule by mouth every seven (7) days for 24 doses.  gabapentin (NEURONTIN) 800 mg tablet Take 1 Tablet by mouth four (4) times daily as needed for Pain. Max Daily Amount: 3,200 mg.    glucose blood VI test strips (FreeStyle Test) strip CHECK BLOOD SUGAR DAILY E11.8    metFORMIN (GLUCOPHAGE) 500 mg tablet TAKE 1 TABLET BY MOUTH TWICE DAILY WITH MEALS    mirtazapine (REMERON) 15 mg tablet TK 1 T PO QD BEFORE BEDTIME    atorvastatin (LIPITOR) 40 mg tablet TAKE 1 TABLET BY MOUTH DAILY    lancets (FREESTYLE LANCETS) 28 gauge misc CHECK BLOOD SUGAR ONCE DAILY    Blood-Glucose Meter (BLOOD GLUCOSE MONITORING) monitoring kit E11.8  To check blood sugar daily     No current facility-administered medications for this visit.      No Known Allergies   Past Medical History:   Diagnosis Date    Anxiety     Arrhythmia     Arthritis     RA    Arthritis of knee 1/30/2012    Autoimmune disease (HCC)     RA    Bronchitis     Chronic pain     due to RA    Depression     Dyslipidemia     Morbid obesity (HCC)     Nausea & vomiting     Other screening mammogram 8/19/15    Mammogram in 1 year    Overweight(278.02)     Papanicolaou smear for cervical cancer screening 8/14/15    neg; HPV neg    Rheumatoid aortitis     SVT (supraventricular tachycardia) ([facility] [address] 1993    hx of. underwent cardiac ablation      Past Surgical History:   Procedure Laterality Date    HX GYN      BTL    HX HEENT 6-30-14    root canal    HX HIP REPLACEMENT      2006, 2009    HX HIP REPLACEMENT Right     HX KNEE REPLACEMENT      2012, Dr. Clement Dalal      2 rods and 4 bolts Dr. An Leonard  7/2014    HX ORTHOPAEDIC  2005 2009    bilateral total hip replacement    HX ORTHOPAEDIC      left toe    HX ORTHOPAEDIC  2012    bilateral knee replacements    HX ORTHOPAEDIC  2013    left elbow x 5    HX TONSILLECTOMY      DC BREAST SURGERY PROCEDURE UNLISTED      right breast mass excision    DC CARDIAC SURG PROCEDURE UNLIST  1993    cardiac ablation for SVT      reports that she quit smoking about 9 years ago. She has a 10.00 pack-year smoking history. She has never used smokeless tobacco. She reports that she does not drink alcohol and does not use drugs. family history includes Breast Cancer in an other family member; Diabetes in her brother and mother; Heart Disease in her brother and father. ROS    Objective:     Patient-Reported Vitals 11/5/2021   Patient-Reported Weight 220lb        General:  Well defined, nourished, and groomed individual in no acute distress. Psych:  Good mood and bright affect    NEUROLOGICAL EXAMINATION:     Mental Status:   Alert and oriented to person, place, and time with recent and remote memory intact. Attention span and concentration are normal. Speech is fluent with a full fund of knowledge. Cranial Nerves:  I: smell Not tested   II: visual fields Not assessed   II: pupils Equal, round, reactive to light   II: optic disc Not assessed   III,VII: ptosis none   III,IV,VI: extraocular muscles  Full ROM   V: mastication normal   V: facial light touch sensation  Not assessed   VII: facial muscle function   symmetric   VIII: hearing symmetric   IX: soft palate elevation  normal   XI: trapezius strength  Not assessed   XI: sternocleidomastoid strength Not assessed   XI: neck flexion strength  Not assessed   XII: tongue  midline     Motor Examination: Normal tone and bulk. Strength was not assessed      Sensory exam:  Not assessed     Coordination:  No resting or intention tremor    Gait and Station:  No muscle wasting or fasiculations noted. Reflexes:  Not assessed    Assessment & Plan:       ICD-10-CM ICD-9-CM    1. Peripheral polyneuropathy  G62.9 356.9 DULoxetine (CYMBALTA) 30 mg capsule      amitriptyline (ELAVIL) 10 mg tablet   2. Lumbar stenosis with neurogenic claudication  M48.062 724.03    3. Severe episode of recurrent major depressive disorder, [facility]  F33.2 296.33 DULoxetine (CYMBALTA) 30 mg capsule     At this point, the pain related to her neuropathy is affecting her ability to ambulate. While she has no issues with balance, she is finding that the decrease in activity and increased reliance on her wheelchair is making her weaker. I would like for her to continue her present dose of gabapentin. She will add a low dose Elavil of 10mg at bedtime and start Cymbalta 30mg in the morning. Pending her response, we may increase these doses but my hope is that this will provide enough relief that she will be able to participate in PT to help improve her strength. Follow up in about a month        We discussed the expected course, resolution and complications of the diagnosis(es) in detail. Medication risks, benefits, costs, interactions, and alternatives were discussed as indicated. I advised her to contact the office if her condition worsens, changes or fails to improve as anticipated. She expressed understanding with the diagnosis(es) and plan. Ileanareginaldo Johnson, was evaluated through a synchronous (real-time) audio-video encounter. The patient (or guardian if applicable) is aware that this is a billable service, which includes applicable co-pays. Verbal consent to proceed has been obtained.  The visit was conducted pursuant to the emergency declaration under the [address] waiver authority and the Coronavirus Preparedness and Response Supplemental Appropriations Act. Patient identification was verified, and a caregiver was present when appropriate. The patient was located at home in a state where the provider was licensed to provide care.       Maged White NP

## 2022-06-15 NOTE — PROGRESS NOTES
Request for wellbutrin denied , she already takes cymbalta and elavil. She is not involved in close monitoring for weight loss.    Both of the meds above are antidepressants so I do not want to have her taking all three

## 2022-06-29 DIAGNOSIS — I10 ESSENTIAL HYPERTENSION: ICD-10-CM

## 2022-06-29 NOTE — TELEPHONE ENCOUNTER
PCP: Sanaz Worrell MD    Last appt: 11/5/2021  Future Appointments   Date Time Provider Kenn Eller   7/5/2022  2:30 PM Rinku Rocha NP [facility] AMB       Requested Prescriptions     Pending Prescriptions Disp Refills    carvediloL (COREG) 25 mg tablet 60 Tablet 2     Sig: Take 1 Tablet by mouth two (2) times a day.        Prior labs and Blood pressures:  BP Readings from Last 3 Encounters:   07/01/21 116/71   05/17/21 (!) 102/55   12/17/19 118/78     Lab Results   Component Value Date/Time    Sodium 136 03/07/2022 01:40 PM    Potassium 4.5 03/07/2022 01:40 PM    Chloride 103 03/07/2022 01:40 PM    CO2 29 03/07/2022 01:40 PM    Anion gap 4 (L) 03/07/2022 01:40 PM    Glucose 115 (H) 03/07/2022 01:40 PM    BUN 24 (H) 03/07/2022 01:40 PM    Creatinine 0.82 03/07/2022 01:40 PM    BUN/Creatinine ratio 29 (H) 03/07/2022 01:40 PM    GFR est AA >60 03/07/2022 01:40 PM    GFR est non-AA >60 03/07/2022 01:40 PM    Calcium 8.8 03/07/2022 01:40 PM     Lab Results   Component Value Date/Time    Hemoglobin A1c 6.7 (H) 03/07/2022 01:40 PM     Lab Results   Component Value Date/Time    Cholesterol, total 197 09/04/2020 12:00 PM    HDL Cholesterol 50 09/04/2020 12:00 PM    LDL, calculated 121 (H) 09/04/2020 12:00 PM    VLDL, calculated 26 09/04/2020 12:00 PM    Triglyceride 130 09/04/2020 12:00 PM    CHOL/HDL Ratio 3.9 09/04/2020 12:00 PM     Lab Results   Component Value Date/Time    Vitamin D 25-Hydroxy 13.4 (L) 03/07/2022 01:40 PM       Lab Results   Component Value Date/Time    TSH 6.92 (H) 03/07/2022 01:40 PM

## 2022-07-05 ENCOUNTER — VIRTUAL VISIT (OUTPATIENT)
Dept: NEUROLOGY | Age: 67
End: 2022-07-05

## 2022-07-05 DIAGNOSIS — F33.2 SEVERE EPISODE OF RECURRENT MAJOR DEPRESSIVE DISORDER, WITHOUT PSYCHOTIC FEATURES (HCC): ICD-10-CM

## 2022-07-05 DIAGNOSIS — G62.9 PERIPHERAL POLYNEUROPATHY: ICD-10-CM

## 2022-07-05 DIAGNOSIS — I10 ESSENTIAL HYPERTENSION: ICD-10-CM

## 2022-07-05 RX ORDER — AMITRIPTYLINE HYDROCHLORIDE 10 MG/1
10 TABLET, FILM COATED ORAL
Qty: 30 TABLET | Refills: 2 | Status: SHIPPED | OUTPATIENT
Start: 2022-07-05 | End: 2022-08-03 | Stop reason: SDUPTHER

## 2022-07-05 RX ORDER — GABAPENTIN 800 MG/1
800 TABLET ORAL
Qty: 360 TABLET | Refills: 1 | Status: SHIPPED | OUTPATIENT
Start: 2022-07-05 | End: 2022-10-10

## 2022-07-05 RX ORDER — DULOXETIN HYDROCHLORIDE 30 MG/1
30 CAPSULE, DELAYED RELEASE ORAL DAILY
Qty: 30 CAPSULE | Refills: 2 | Status: SHIPPED | OUTPATIENT
Start: 2022-07-05 | End: 2022-08-03

## 2022-07-05 RX ORDER — CARVEDILOL 25 MG/1
25 TABLET ORAL 2 TIMES DAILY
Qty: 60 TABLET | Refills: 2 | Status: SHIPPED | OUTPATIENT
Start: 2022-07-05 | End: 2022-07-07 | Stop reason: SDUPTHER

## 2022-07-07 NOTE — TELEPHONE ENCOUNTER
PCP: Temitope Castro MD    Last appt: 11/5/2021  No future appointments.     Requested Prescriptions      No prescriptions requested or ordered in this encounter       Prior labs and Blood pressures:  BP Readings from Last 3 Encounters:   07/01/21 116/71   05/17/21 (!) 102/55   12/17/19 118/78     Lab Results   Component Value Date/Time    Sodium 136 03/07/2022 01:40 PM    Potassium 4.5 03/07/2022 01:40 PM    Chloride 103 03/07/2022 01:40 PM    CO2 29 03/07/2022 01:40 PM    Anion gap 4 (L) 03/07/2022 01:40 PM    Glucose 115 (H) 03/07/2022 01:40 PM    BUN 24 (H) 03/07/2022 01:40 PM    Creatinine 0.82 03/07/2022 01:40 PM    BUN/Creatinine ratio 29 (H) 03/07/2022 01:40 PM    GFR est AA >60 03/07/2022 01:40 PM    GFR est non-AA >60 03/07/2022 01:40 PM    Calcium 8.8 03/07/2022 01:40 PM     Lab Results   Component Value Date/Time    Hemoglobin A1c 6.7 (H) 03/07/2022 01:40 PM     Lab Results   Component Value Date/Time    Cholesterol, total 197 09/04/2020 12:00 PM    HDL Cholesterol 50 09/04/2020 12:00 PM    LDL, calculated 121 (H) 09/04/2020 12:00 PM    VLDL, calculated 26 09/04/2020 12:00 PM    Triglyceride 130 09/04/2020 12:00 PM    CHOL/HDL Ratio 3.9 09/04/2020 12:00 PM     Lab Results   Component Value Date/Time    Vitamin D 25-Hydroxy 13.4 (L) 03/07/2022 01:40 PM       Lab Results   Component Value Date/Time    TSH 6.92 (H) 03/07/2022 01:40 PM

## 2022-07-11 RX ORDER — CARVEDILOL 25 MG/1
25 TABLET ORAL 2 TIMES DAILY
Qty: 60 TABLET | Refills: 2 | Status: SHIPPED | OUTPATIENT
Start: 2022-07-11 | End: 2022-09-23

## 2022-07-17 DIAGNOSIS — E11.21 TYPE 2 DIABETES WITH NEPHROPATHY (HCC): Primary | ICD-10-CM

## 2022-07-17 RX ORDER — BLOOD-GLUCOSE METER
EACH MISCELLANEOUS
Qty: 1 EACH | Refills: 1 | Status: SHIPPED | OUTPATIENT
Start: 2022-07-17

## 2022-07-17 RX ORDER — ISOPROPYL ALCOHOL 70 ML/100ML
SWAB TOPICAL
Qty: 100 PAD | Refills: 3 | Status: SHIPPED | OUTPATIENT
Start: 2022-07-17

## 2022-07-21 DIAGNOSIS — E11.8 CONTROLLED TYPE 2 DIABETES MELLITUS WITH COMPLICATION, WITHOUT LONG-TERM CURRENT USE OF INSULIN (HCC): ICD-10-CM

## 2022-07-21 DIAGNOSIS — E11.21 TYPE 2 DIABETES WITH NEPHROPATHY (HCC): ICD-10-CM

## 2022-07-21 NOTE — TELEPHONE ENCOUNTER
PCP: Mian Garza MD    Last appt: 11/5/2021  Future Appointments   Date Time Provider Kenn Eller   8/3/2022  3:00 PM Anurag Payne NP [facility]       Requested Prescriptions     Pending Prescriptions Disp Refills    Blood Glucose Control, Low (True Metrix Level 1) soln 1 Each 1     Sig: Use as directed for glucometer testing    Blood-Glucose Meter (Blood Glucose Monitoring) monitoring kit 1 Kit 0     Sig: E11.8  To check blood sugar daily    glucose blood VI test strips (FreeStyle Test) strip 100 Strip 3     Sig: CHECK BLOOD SUGAR DAILY E11.8    lancets (FreeStyle Lancets) 28 gauge misc 100 Lancet 3     Sig: CHECK BLOOD SUGAR ONCE DAILY       Prior labs and Blood pressures:  BP Readings from Last 3 Encounters:   07/01/21 116/71   05/17/21 (!) 102/55   12/17/19 118/78     Lab Results   Component Value Date/Time    Sodium 136 03/07/2022 01:40 PM    Potassium 4.5 03/07/2022 01:40 PM    Chloride 103 03/07/2022 01:40 PM    CO2 29 03/07/2022 01:40 PM    Anion gap 4 (L) 03/07/2022 01:40 PM    Glucose 115 (H) 03/07/2022 01:40 PM    BUN 24 (H) 03/07/2022 01:40 PM    Creatinine 0.82 03/07/2022 01:40 PM    BUN/Creatinine ratio 29 (H) 03/07/2022 01:40 PM    GFR est AA >60 03/07/2022 01:40 PM    GFR est non-AA >60 03/07/2022 01:40 PM    Calcium 8.8 03/07/2022 01:40 PM     Lab Results   Component Value Date/Time    Hemoglobin A1c 6.7 (H) 03/07/2022 01:40 PM     Lab Results   Component Value Date/Time    Cholesterol, total 197 09/04/2020 12:00 PM    HDL Cholesterol 50 09/04/2020 12:00 PM    LDL, calculated 121 (H) 09/04/2020 12:00 PM    VLDL, calculated 26 09/04/2020 12:00 PM    Triglyceride 130 09/04/2020 12:00 PM    CHOL/HDL Ratio 3.9 09/04/2020 12:00 PM     Lab Results   Component Value Date/Time    Vitamin D 25-Hydroxy 13.4 (L) 03/07/2022 01:40 PM       Lab Results   Component Value Date/Time    TSH 6.92 (H) 03/07/2022 01:40 PM

## 2022-07-27 RX ORDER — BLOOD SUGAR DIAGNOSTIC
STRIP MISCELLANEOUS
Qty: 100 STRIP | Refills: 3 | Status: SHIPPED | OUTPATIENT
Start: 2022-07-27

## 2022-07-27 RX ORDER — LANCETS 28 GAUGE
EACH MISCELLANEOUS
Qty: 100 LANCET | Refills: 3 | Status: SHIPPED | OUTPATIENT
Start: 2022-07-27

## 2022-07-27 RX ORDER — BLOOD-GLUCOSE METER
EACH MISCELLANEOUS
Qty: 1 EACH | Refills: 1 | OUTPATIENT
Start: 2022-07-27

## 2022-07-27 RX ORDER — INSULIN PUMP SYRINGE, 3 ML
EACH MISCELLANEOUS
Qty: 1 KIT | Refills: 0 | Status: SHIPPED | OUTPATIENT
Start: 2022-07-27

## 2022-08-03 ENCOUNTER — VIRTUAL VISIT (OUTPATIENT)
Dept: NEUROLOGY | Age: 67
End: 2022-08-03
Payer: MEDICARE

## 2022-08-03 DIAGNOSIS — G62.9 PERIPHERAL POLYNEUROPATHY: Primary | ICD-10-CM

## 2022-08-03 DIAGNOSIS — F33.2 SEVERE EPISODE OF RECURRENT MAJOR DEPRESSIVE DISORDER, WITHOUT PSYCHOTIC FEATURES (HCC): ICD-10-CM

## 2022-08-03 DIAGNOSIS — R26.81 GAIT INSTABILITY: ICD-10-CM

## 2022-08-03 PROCEDURE — G9717 DOC PT DX DEP/BP F/U NT REQ: HCPCS | Performed by: NURSE PRACTITIONER

## 2022-08-03 PROCEDURE — G8756 NO BP MEASURE DOC: HCPCS | Performed by: NURSE PRACTITIONER

## 2022-08-03 PROCEDURE — 99214 OFFICE O/P EST MOD 30 MIN: CPT | Performed by: NURSE PRACTITIONER

## 2022-08-03 PROCEDURE — 1101F PT FALLS ASSESS-DOCD LE1/YR: CPT | Performed by: NURSE PRACTITIONER

## 2022-08-03 PROCEDURE — 1090F PRES/ABSN URINE INCON ASSESS: CPT | Performed by: NURSE PRACTITIONER

## 2022-08-03 PROCEDURE — G8427 DOCREV CUR MEDS BY ELIG CLIN: HCPCS | Performed by: NURSE PRACTITIONER

## 2022-08-03 PROCEDURE — 1123F ACP DISCUSS/DSCN MKR DOCD: CPT | Performed by: NURSE PRACTITIONER

## 2022-08-03 PROCEDURE — 3017F COLORECTAL CA SCREEN DOC REV: CPT | Performed by: NURSE PRACTITIONER

## 2022-08-03 RX ORDER — DULOXETIN HYDROCHLORIDE 60 MG/1
60 CAPSULE, DELAYED RELEASE ORAL DAILY
Qty: 90 CAPSULE | Refills: 2 | Status: SHIPPED | OUTPATIENT
Start: 2022-08-03

## 2022-08-03 RX ORDER — AMITRIPTYLINE HYDROCHLORIDE 25 MG/1
25 TABLET, FILM COATED ORAL
Qty: 90 TABLET | Refills: 2 | Status: SHIPPED | OUTPATIENT
Start: 2022-08-03

## 2022-08-03 NOTE — PROGRESS NOTES
[address] [facility],[address]Th Floor  [address]   [facility] [address] [facility] [place] [address] [address]   [phone] Office   [phone] Fax           Date:  22     Name:  Shannon Bangura  :  1955  MRN:  [id number]     PCP:  Jayda Tyler MD    Frederic Torre is a 74 y.o. female who was seen by synchronous (real-time) audio-video technology on 8/3/2022 for Peripheral Neuropathy    Subjective: At her last office visit, she was started on Cymbalta 30mg and Elavil 10mg. These have helped her sleep better and her depression is better. Initially, she states that the pain in her feet was better but that seems to have a diminished effect now. She states that the bottom of her feet hurt. She is still not walking much but she is walking more because she got an upright walker. Recap from LOV:  At this point, the pain related to her neuropathy is affecting her ability to ambulate. While she has no issues with balance, she is finding that the decrease in activity and increased reliance on her wheelchair is making her weaker. I would like for her to continue her present dose of gabapentin. She will add a low dose Elavil of 10mg at bedtime and start Cymbalta 30mg in the morning. Pending her response, we may increase these doses but my hope is that this will provide enough relief that she will be able to participate in PT to help improve her strength. Follow up in about a month      EMG/ NCS Report  DRUG REHABILITATION [facility] - DAY ONE [facility]  [facility],  [address], [address]   Ph: [phone]   FAX: [phone]/ [phone]    Test Date:  2021    Patient: Jay Bowie : 1955 Physician: Lisa Vargas MD   Sex: Female Height:  cm Ref Phys: Jerod Grimm MD   ID#: [id number] Weight:  lbs. Technician: Hallie Stevenson     Patient Complaints:    CC:  Bilateral feet numbness, pain, swelling; evaluate for peripheral neuropathy     EMG & NCV Findings:  Evaluation of the Right tibial motor nerve showed decreased conduction velocity (Knee-Ankle, 38 m/s). The Left Sup Fibular sensory, the Left sural sensory, and the Right sural sensory nerves showed reduced amplitude (L3.1, L3.7, R3.5 µV). All remaining nerves (as indicated in the following tables) were within normal limits. All F Wave latencies were within normal limits. The muscle scoring table definition stored in the current test does not match the sentence generator setup. The sentence could not be generated. EMG lumbar paraspinal muscles not performed due to prior lumbar laminectomy    Summary:      Mild reduced amplitude of bilateral sural sensory responses and left superficial peroneal sensory response. Right superficial peroneal sensory response shows normal amplitude. All lower extremity since responses show normal peak latencies. Lower extremity motor responses show normal amplitude, normal onset latencies, and normal conduction velocity with the exception of the right tibial motor response which shows borderline (minimally) slowed conduction velocity. 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 EMG reveals chronic, bilateral S1 greater than L5 lumbar radiculopathy (left more than right).       ___________________________  Constantino Craft M.D.     Nerve Conduction Studies  Anti Sensory Summary Table     Site NR Peak (ms) Norm Peak (ms) P-T Amp (µV) Norm P-T Amp Site1 Site2 Dist (cm)   Left Sup Fibular Anti Sensory (Lat ankle)  32°C   Lower leg    2.3 <4.6 3.1 >4 Lower leg Lat ankle 10.0   Site 2    2.2  2.1       Site 3    2.3  4.3       Right Sup Fibular Anti Sensory (Lat ankle)  33.1°C   Lower leg    2.6 <4.6 5.7 >4 Lower leg Lat ankle 10.0   Site 2    2.4  6.8           2.3  12.8       Left Sural Anti Sensory (Lat Mall)  31.4°C   Calf    3.8 <4.5 3.7 >4.0 Calf Lat Mall 14.0   Site 2    3.7  5.7       Site 3    3.4  7.2       Right Sural Anti Sensory (Lat Mall)  32.4°C   Calf    3.1 <4.5 3.5 >4.0 Calf Lat Mall 14.0   Site 2    3.0  3.1       Site 3    3.0  2.8         Motor Summary Table     Site NR Onset (ms) Norm Onset (ms) O-P Amp (mV) Norm O-P Amp Neg Area% (1st) Site1 Site2 Dist (cm) Anmol (m/s) Norm Anmol (m/s)   Left Fibular Motor (Ext Dig Brev)  32.4°C   Ankle    4.3 <6.5 3.5 >1.1  Ankle Ext Dig Brev 8.0     B Fib    11.5  3.3   B Fib Ankle 30.0 42 >38   Poplt    13.2  3.4   Poplt B Fib 10.0 59 >42   Right Fibular Motor (Ext Dig Brev)  33.1°C   Ankle    4.8 <6.5 2.6 >1.1  Ankle Ext Dig Brev 8.0     B Fib    12.3  2.5   B Fib Ankle 29.0 39 >38   Poplt    13.8  2.4   Poplt B Fib 10.0 67 >42   Left Tibial Motor (Abd Ku Brev)  32.4°C   Ankle    4.0 <6.1 4.2 >1.1  Ankle Abd Ku Brev 8.0     Knee    12.8  2.8   Knee Ankle 35.0 40 >39   Right Tibial Motor (Abd Ku Brev)  33.1°C   Ankle    4.5 <6.1 4.3 >1.1  Ankle Abd Ku Brev 8.0     Knee    13.4  3.1   Knee Ankle 34.0 38 >39     F Wave Studies     NR F-Lat (ms) Lat Norm (ms) L-R F-Lat (ms) L-R Lat Norm   Left Tibial (Mrkrs) (Abd Hallucis)  32.4°C      51.91 <61 0.00 <5.7   Right Tibial (Mrkrs) (Abd Hallucis)  33.1°C      51.91 <61 0.00 <5.7     H Reflex Studies     NR H-Lat (ms) L-R H-Lat (ms) L-R Lat Norm   Left Tibial (Gastroc)  32.4°C      40.00 0.00 <2.0   Right Tibial (Gastroc)  32.8°C      40.00 0.00 <2.0     EMG     Side Muscle Nerve Root Ins Act Fibs Psw Recrt Amp Dur Poly Comment   Left AntTibialis Dp Br Peron L4-5 Nml Nml Nml [address] 1+    Left Gastroc Tibial S1-2 Nml Nml Nml [employer] 0    Left GluteusMed SupGluteal L5-S1 Nml Nml [address] 0    Left Ext Dig Brev Dp Br Peron L5, S1 Nml Nml Nml Nml Nml Nml 0    Left VastusMed Femoral L2-4 Nml Nml Nml Nml Nml Nml 0    Left GluteusMax InfGluteal L5-S2 Nml Nml Nml Nml Nml Nml 0    Right VastusMed Femoral L2-4 Nml Nml Nml [employer] 0 mild reduced   Right AntTibialis Dp Br Peron L4-5 Nml Nml Nml Nml Nml Inc 0    Right GluteusMax InfGluteal L5-S2 Nml Nml Nml [address] 0    Right Gastroc Tibial S1-2 Incr Nml 1+ [employer] 0    Right GluteusMed SupGluteal L5-S1 Nml Nml Nml [employer] 0    Right Ext Dig Brev Dp Br Peron L5, S1 Nml Nml Nml Nml Nml Nml 0      Waveforms:                              Current Outpatient Medications   Medication Sig    Blood-Glucose Meter (Blood Glucose Monitoring) monitoring kit E11.8  To check blood sugar daily    glucose blood VI test strips (FreeStyle Test) strip CHECK BLOOD SUGAR DAILY E11.8    lancets (FreeStyle Lancets) 28 gauge misc CHECK BLOOD SUGAR ONCE DAILY    alcohol swabs padm Use daily for blood sugar monitoring    Blood Glucose Control, Low (True Metrix Level 1) soln Use as directed for glucometer testing    carvediloL (COREG) 25 mg tablet Take 1 Tablet by mouth two (2) times a day. metFORMIN (GLUCOPHAGE) 500 mg tablet Take 1 Tablet by mouth two (2) times daily (with meals). atorvastatin (LIPITOR) 40 mg tablet Take 1 Tablet by mouth daily. levothyroxine (SYNTHROID) 125 mcg tablet TAKE 1 TABLET BY MOUTH DAILY BEFORE AND BREAKFAST    DULoxetine (CYMBALTA) 30 mg capsule Take 1 Capsule by mouth daily. amitriptyline (ELAVIL) 10 mg tablet Take 1 Tablet by mouth nightly.    gabapentin (NEURONTIN) 800 mg tablet Take 1 Tablet by mouth four (4) times daily as needed for Pain. Max Daily Amount: 3,200 mg.    ergocalciferol (ERGOCALCIFEROL) 1,250 mcg (50,000 unit) capsule Take 1 Capsule by mouth every seven (7) days for 24 doses. mirtazapine (REMERON) 15 mg tablet TK 1 T PO QD BEFORE BEDTIME     No current facility-administered medications for this visit.      No Known Allergies   Past Medical History:   Diagnosis Date    Anxiety     Arrhythmia     Arthritis     RA    Arthritis of knee 1/30/2012    Autoimmune disease ([facility]     RA    Bronchitis     Chronic pain     due to RA    Depression Dyslipidemia     Morbid obesity (HCC)     Nausea & vomiting     Other screening mammogram 8/19/15    Mammogram in 1 year    Overweight(278.02)     Papanicolaou smear for cervical cancer screening 8/14/15    neg; HPV neg    Rheumatoid aortitis     SVT (supraventricular tachycardia) ([facility] [address] 1993    hx of. underwent cardiac ablation      Past Surgical History:   Procedure Laterality Date    HX GYN      BTL    HX HEENT  6-30-14    root canal    HX HIP REPLACEMENT      2006, 2009    HX HIP REPLACEMENT Right     HX KNEE REPLACEMENT      2012, Dr. Kathe Balderrama      2 rods and 4 bolts Dr. Dane Arita  7/2014    HX ORTHOPAEDIC  2005 2009    bilateral total hip replacement    HX ORTHOPAEDIC      left toe    HX ORTHOPAEDIC  2012    bilateral knee replacements    HX ORTHOPAEDIC  2013    left elbow x 5    HX TONSILLECTOMY      TN BREAST SURGERY PROCEDURE UNLISTED      right breast mass excision    TN CARDIAC SURG PROCEDURE UNLIST  1993    cardiac ablation for SVT      reports that she quit smoking about 9 years ago. She has a 10.00 pack-year smoking history. She has never used smokeless tobacco. She reports that she does not drink alcohol and does not use drugs. family history includes Breast Cancer in an other family member; Diabetes in her brother and mother; Heart Disease in her brother and father. ROS    Objective:     Patient-Reported Vitals 11/5/2021   Patient-Reported Weight 220lb        General:  Well defined, nourished, and groomed individual in no acute distress. Psych:  Good mood and bright affect    NEUROLOGICAL EXAMINATION:     Mental Status:   Alert and oriented to person, place, and time with recent and remote memory intact. Attention span and concentration are normal. Speech is fluent with a full fund of knowledge.       Cranial Nerves:  I: smell Not tested   II: visual fields Not assessed   II: pupils Equal, round, reactive to light   II: optic disc Not assessed   III,VII: ptosis none III,IV,VI: extraocular muscles  Full ROM   V: mastication normal   V: facial light touch sensation  Not assessed   VII: facial muscle function   symmetric   VIII: hearing symmetric   IX: soft palate elevation  normal   XI: trapezius strength  Not assessed   XI: sternocleidomastoid strength Not assessed   XI: neck flexion strength  Not assessed   XII: tongue  midline     Motor Examination: Normal tone and bulk. Strength was not assessed      Sensory exam:  Not assessed     Coordination:  No resting or intention tremor    Gait and Station:  No muscle wasting or fasiculations noted. Reflexes:  Not assessed    Assessment & Plan:       ICD-10-CM ICD-9-CM    1. Peripheral polyneuropathy  G62.9 356.9 DULoxetine (CYMBALTA) 60 mg capsule      amitriptyline (ELAVIL) 25 mg tablet      REFERRAL TO HOME HEALTH      2. Severe episode of recurrent major depressive disorder, without psychotic features ([facility] [address]  F33.2 296.33 DULoxetine (CYMBALTA) 60 mg capsule      3. Gait instability  R26.81 781.2 REFERRAL TO HOME HEALTH        With the addition of the Elavil and Cymbalta, her sleep and anxiety are better. The discomfort from the peripheral neuropathy is only marginally better. Will increase both for effect. I would like to try to have her participate in home PT to improve strength, balance and mobility. Follow up in three months. We discussed the expected course, resolution and complications of the diagnosis(es) in detail. Medication risks, benefits, costs, interactions, and alternatives were discussed as indicated. I advised her to contact the office if her condition worsens, changes or fails to improve as anticipated. She expressed understanding with the diagnosis(es) and plan. Ileana Johnson, was evaluated through a synchronous (real-time) audio-video encounter. The patient (or guardian if applicable) is aware that this is a billable service, which includes applicable co-pays.  Verbal consent to proceed has been obtained. The visit was conducted pursuant to the emergency declaration under the [facility] [address] and the [employer] General Act. Patient identification was verified, and a caregiver was present when appropriate. The patient was located at home in a state where the provider was licensed to provide care.       India Pruitt NP

## 2022-08-11 ENCOUNTER — TELEPHONE (OUTPATIENT)
Dept: NEUROLOGY | Age: 67
End: 2022-08-11

## 2022-09-09 ENCOUNTER — TELEPHONE (OUTPATIENT)
Dept: NEUROLOGY | Age: 67
End: 2022-09-09

## 2022-09-09 ENCOUNTER — VIRTUAL VISIT (OUTPATIENT)
Dept: FAMILY MEDICINE CLINIC | Age: 67
End: 2022-09-09
Payer: MEDICARE

## 2022-09-09 DIAGNOSIS — E55.9 VITAMIN D DEFICIENCY: ICD-10-CM

## 2022-09-09 DIAGNOSIS — M05.79 SEROPOSITIVE RHEUMATOID ARTHRITIS OF MULTIPLE SITES (HCC): ICD-10-CM

## 2022-09-09 DIAGNOSIS — E03.9 ACQUIRED HYPOTHYROIDISM: Primary | ICD-10-CM

## 2022-09-09 DIAGNOSIS — I10 ESSENTIAL HYPERTENSION: ICD-10-CM

## 2022-09-09 DIAGNOSIS — E78.00 HYPERCHOLESTEROLEMIA: ICD-10-CM

## 2022-09-09 DIAGNOSIS — E11.21 TYPE 2 DIABETES WITH NEPHROPATHY (HCC): ICD-10-CM

## 2022-09-09 PROCEDURE — G8756 NO BP MEASURE DOC: HCPCS | Performed by: FAMILY MEDICINE

## 2022-09-09 PROCEDURE — G8427 DOCREV CUR MEDS BY ELIG CLIN: HCPCS | Performed by: FAMILY MEDICINE

## 2022-09-09 PROCEDURE — 1101F PT FALLS ASSESS-DOCD LE1/YR: CPT | Performed by: FAMILY MEDICINE

## 2022-09-09 PROCEDURE — 99213 OFFICE O/P EST LOW 20 MIN: CPT | Performed by: FAMILY MEDICINE

## 2022-09-09 PROCEDURE — 2022F DILAT RTA XM EVC RTNOPTHY: CPT | Performed by: FAMILY MEDICINE

## 2022-09-09 PROCEDURE — 1123F ACP DISCUSS/DSCN MKR DOCD: CPT | Performed by: FAMILY MEDICINE

## 2022-09-09 PROCEDURE — G8421 BMI NOT CALCULATED: HCPCS | Performed by: FAMILY MEDICINE

## 2022-09-09 PROCEDURE — 3017F COLORECTAL CA SCREEN DOC REV: CPT | Performed by: FAMILY MEDICINE

## 2022-09-09 PROCEDURE — G8536 NO DOC ELDER MAL SCRN: HCPCS | Performed by: FAMILY MEDICINE

## 2022-09-09 PROCEDURE — G9717 DOC PT DX DEP/BP F/U NT REQ: HCPCS | Performed by: FAMILY MEDICINE

## 2022-09-09 PROCEDURE — 1090F PRES/ABSN URINE INCON ASSESS: CPT | Performed by: FAMILY MEDICINE

## 2022-09-09 PROCEDURE — 3044F HG A1C LEVEL LT 7.0%: CPT | Performed by: FAMILY MEDICINE

## 2022-09-09 NOTE — TELEPHONE ENCOUNTER
Patient called and stated she needs a Tens Machine ordered thru [employer].     Please contact with the status

## 2022-09-09 NOTE — PROGRESS NOTES
1. Have you been to the ER, urgent care clinic since your last visit? Hospitalized since your last visit? No    2. Have you seen or consulted any other health care providers outside of the [address] [place] [address] since your last visit? Include any pap smears or colon screening. No    Chief Complaint   Patient presents with    Follow-up    Medication Refill     Health Maintenance Due   Topic Date Due    COVID-19 Vaccine (1) Never done    Depression Monitoring  Never done    Shingrix Vaccine Age 50> (1 of 2) Never done    Breast Cancer Screen Mammogram  08/19/2016    Pneumococcal 65+ years (2 - PCV) 07/17/2018    Colorectal Cancer Screening Combo  10/28/2018    Eye Exam Retinal or Dilated  03/07/2020    Medicare Yearly Exam  12/17/2020    Foot Exam Q1  12/17/2020    MICROALBUMIN Q1  09/04/2021    Lipid Screen  09/04/2021    Flu Vaccine (1) 09/01/2022     3 most recent PHQ Screens 9/9/2022   Little interest or pleasure in doing things Several days   Feeling down, depressed, irritable, or hopeless Several days   Total Score PHQ 2 2     Abuse Screening Questionnaire 9/9/2022   Do you ever feel afraid of your partner? N   Are you in a relationship with someone who physically or mentally threatens you? N   Is it safe for you to go home?  Y     Learning Assessment 11/27/2019   PRIMARY LEARNER Patient   HIGHEST LEVEL OF EDUCATION - PRIMARY LEARNER  -   BARRIERS PRIMARY LEARNER -   CO-LEARNER CAREGIVER No   PRIMARY LANGUAGE ENGLISH    NEED -   LEARNER PREFERENCE PRIMARY DEMONSTRATION   LEARNING SPECIAL TOPICS -   ANSWERED BY patient    RELATIONSHIP SELF   ASSESSMENT COMMENT -     Patient-Reported Vitals 9/9/2022   Patient-Reported Weight 220lb

## 2022-09-09 NOTE — PROGRESS NOTES
Frederic Torre is a 74 y.o. female who was seen by synchronous (real-time) audio-video technology on 9/9/2022 for Follow-up and Medication Refill    No complaints  Needs refill on thyroid med  Has not been taking the levothyroxine daily. She said she was feeling depressed and just has not felt like taking her meds as prescribed  The last level in march was low  Needs testing before refills however the level may not be very reliable since she admits to periods of noncompliance    Assessment & Plan:   Diagnoses and all orders for this visit:    1. Acquired hypothyroidism  -     TSH 3RD GENERATION; Future    2. Type 2 diabetes with nephropathy (HCC)  -     HEMOGLOBIN A1C WITH EAG; Future    3. Essential hypertension  -     METABOLIC PANEL, COMPREHENSIVE; Future    4. Hypercholesterolemia  -     LIPID PANEL; Future    5. Vitamin D deficiency  -     VITAMIN D, 25 HYDROXY; Future    6. Seropositive rheumatoid arthritis of multiple sites [facility])          Subjective:       Prior to Admission medications    Medication Sig Start Date End Date Taking? Authorizing Provider   levothyroxine (SYNTHROID) 125 mcg tablet TAKE 1 TABLET BY MOUTH DAILY BEFORE BREAKFAST 9/5/22  Yes Jayda Tyler MD   DULoxetine (CYMBALTA) 60 mg capsule Take 1 Capsule by mouth in the morning. 8/3/22  Yes Lucina Still NP   amitriptyline (ELAVIL) 25 mg tablet Take 1 Tablet by mouth nightly.  8/3/22  Yes Lucina Still NP   Blood-Glucose Meter (Blood Glucose Monitoring) monitoring kit E11.8  To check blood sugar daily 7/27/22  Yes Jayda Tyler MD   glucose blood VI test strips (FreeStyle Test) strip CHECK BLOOD SUGAR DAILY E11.8 7/27/22  Yes Jayda Tyler MD   lancets (FreeStyle Lancets) 28 gauge misc CHECK BLOOD SUGAR ONCE DAILY 7/27/22  Yes Jayda Tyler MD   alcohol swabs padm Use daily for blood sugar monitoring 7/17/22  Yes Jayda Tyler MD   Blood Glucose Control, Low (True Metrix Level 1) soln Use as directed for glucometer testing 7/17/22 Yes Grazyna Tinoco MD   carvediloL (COREG) 25 mg tablet Take 1 Tablet by mouth two (2) times a day. 7/11/22  Yes Grazyna Tinoco MD   metFORMIN (GLUCOPHAGE) 500 mg tablet Take 1 Tablet by mouth two (2) times daily (with meals). 7/5/22  Yes Grazyna Tinoco MD   atorvastatin (LIPITOR) 40 mg tablet Take 1 Tablet by mouth daily. 7/5/22  Yes Grazyna Tinoco MD   gabapentin (NEURONTIN) 800 mg tablet Take 1 Tablet by mouth four (4) times daily as needed for Pain.  Max Daily Amount: 3,200 mg. 7/5/22  Yes Deanna Ayoub NP   mirtazapine (REMERON) 15 mg tablet TK 1 T PO QD BEFORE BEDTIME 11/6/19  Yes Provider, Historical     Patient Active Problem List    Diagnosis Date Noted    Age-related osteoporosis without current pathological fracture 11/27/2019    Type 2 diabetes with nephropathy ([facility] 08/22/2018    Long-term use of immunosuppressant medication 10/24/2017    Morbid obesity with BMI of 45.0-49.9, adult ([facility] [address] 01/17/2017    Vitamin D deficiency 01/17/2017    Osteopenia 01/17/2017    Severe episode of recurrent major depressive disorder, without psychotic features ([address] 01/17/2017    Controlled type 2 diabetes mellitus with complication, without long-term current use of insulin ([address] 11/09/2016    Long term (current) use of systemic steroids 11/03/2016    Long term current use of immunosuppressive drug 11/03/2016    Essential hypertension 11/03/2016    Hypercholesterolemia 11/19/2015    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 90 Capsule 2    amitriptyline (ELAVIL) 25 mg tablet Take 1 Tablet by mouth nightly. 90 Tablet 2    Blood-Glucose Meter (Blood Glucose Monitoring) monitoring kit E11.8  To check blood sugar daily 1 Kit 0    glucose blood VI test strips (FreeStyle Test) strip CHECK BLOOD SUGAR DAILY E11.8 100 Strip 3    lancets (FreeStyle Lancets) 28 gauge misc CHECK BLOOD SUGAR ONCE DAILY 100 Lancet 3    alcohol swabs padm Use daily for blood sugar monitoring 100 Pad 3    Blood Glucose Control, Low (True Metrix Level 1) soln Use as directed for glucometer testing 1 Each 1    carvediloL (COREG) 25 mg tablet Take 1 Tablet by mouth two (2) times a day. 60 Tablet 2    metFORMIN (GLUCOPHAGE) 500 mg tablet Take 1 Tablet by mouth two (2) times daily (with meals). 180 Tablet 2    atorvastatin (LIPITOR) 40 mg tablet Take 1 Tablet by mouth daily. 90 Tablet 2    gabapentin (NEURONTIN) 800 mg tablet Take 1 Tablet by mouth four (4) times daily as needed for Pain.  Max Daily Amount: 3,200 mg. 360 Tablet 1    mirtazapine (REMERON) 15 mg tablet TK 1 T PO QD BEFORE BEDTIME  2       ROS    Objective:     Patient-Reported Vitals 9/9/2022   Patient-Reported Weight 220lb        [INSTRUCTIONS:  \"[x]\" Indicates a positive item  \"[]\" Indicates a negative item  -- DELETE ALL ITEMS NOT EXAMINED]    Constitutional: [x] Appears well-developed and well-nourished [x] No apparent distress      [] Abnormal -     Mental status: [x] Alert and awake  [x] Oriented to person/place/time [x] Able to follow commands    [] Abnormal -     Eyes:   EOM    [x]  Normal    [] Abnormal -   Sclera  [x]  Normal    [] Abnormal -          Discharge [x]  None visible   [] Abnormal -     HENT: [x] Normocephalic, atraumatic  [] Abnormal -   [x] Mouth/Throat: Mucous membranes are moist    External Ears [x] Normal  [] Abnormal -    Neck: [x] No visualized mass [] Abnormal -     Pulmonary/Chest: [x] Respiratory effort normal   [x] No visualized signs of difficulty breathing or respiratory distress        [] Abnormal -      Musculoskeletal:   [x] Normal gait with no signs of ataxia         [x] Normal range of motion of neck        [] Abnormal -     Neurological:        [x] No Facial Asymmetry (Cranial nerve 7 motor function) (limited exam due to video visit)          [x] No gaze palsy        [] Abnormal -          Skin:        [x] No significant exanthematous lesions or discoloration noted on facial skin         [] Abnormal -            Psychiatric:       [x] Normal Affect [] Abnormal -        [x] No Hallucinations    Other pertinent observable physical exam findings:-        We discussed the expected course, resolution and complications of the diagnosis(es) in detail. Medication risks, benefits, costs, interactions, and alternatives were discussed as indicated. I advised her to contact the office if her condition worsens, changes or fails to improve as anticipated. She expressed understanding with the diagnosis(es) and plan. Ileanareginaldo Johnson, was evaluated through a synchronous (real-time) audio-video encounter. The patient (or guardian if applicable) is aware that this is a billable service, which includes applicable co-pays. This Virtual Visit was conducted with patient's (and/or legal guardian's) consent. The visit was conducted pursuant to the emergency declaration under the [address] [place] [address] authority and the [employer] General Act. Patient identification was verified, and a caregiver was present when appropriate.   The patient was located at: Home: [id number] [id number]  The provider was located at: Home: [unfilled]        Greer Best MD

## 2022-09-22 NOTE — TELEPHONE ENCOUNTER
Returned her call. She didn't even remember being referred to Home PT, referral was sent to [facility]. Gave her the phone number so that she could reach out to them and they could evaluate her. Explained to her that they would evaluate her and see if the TENS unit was appropriate and then we could order. She has verbalized understanding.

## 2022-10-04 DIAGNOSIS — F33.2 SEVERE EPISODE OF RECURRENT MAJOR DEPRESSIVE DISORDER, WITHOUT PSYCHOTIC FEATURES (HCC): ICD-10-CM

## 2022-10-04 DIAGNOSIS — G62.9 PERIPHERAL POLYNEUROPATHY: ICD-10-CM

## 2022-10-05 RX ORDER — BUPROPION HYDROCHLORIDE 300 MG/1
300 TABLET ORAL DAILY
Qty: 90 TABLET | Refills: 0 | OUTPATIENT
Start: 2022-10-05

## 2022-10-10 RX ORDER — GABAPENTIN 800 MG/1
TABLET ORAL
Qty: 360 TABLET | Refills: 2 | Status: SHIPPED | OUTPATIENT
Start: 2022-10-10

## 2022-11-25 ENCOUNTER — TELEPHONE (OUTPATIENT)
Dept: NEUROLOGY | Age: 67
End: 2022-11-25

## 2022-12-13 ENCOUNTER — HOSPITAL ENCOUNTER (OUTPATIENT)
Dept: GENERAL RADIOLOGY | Age: 67
Discharge: HOME | End: 2022-12-13
Payer: MEDICARE

## 2022-12-13 ENCOUNTER — VIRTUAL VISIT (OUTPATIENT)
Dept: FAMILY MEDICINE CLINIC | Age: 67
End: 2022-12-13
Payer: MEDICARE

## 2022-12-13 DIAGNOSIS — J20.9 ACUTE BRONCHITIS, UNSPECIFIED ORGANISM: Primary | ICD-10-CM

## 2022-12-13 DIAGNOSIS — Z74.09 IMPAIRED MOBILITY: ICD-10-CM

## 2022-12-13 DIAGNOSIS — J20.9 ACUTE BRONCHITIS, UNSPECIFIED ORGANISM: ICD-10-CM

## 2022-12-13 DIAGNOSIS — E11.8 CONTROLLED TYPE 2 DIABETES MELLITUS WITH COMPLICATION, WITHOUT LONG-TERM CURRENT USE OF INSULIN (HCC): ICD-10-CM

## 2022-12-13 PROCEDURE — 71046 X-RAY EXAM CHEST 2 VIEWS: CPT

## 2022-12-13 PROCEDURE — 1090F PRES/ABSN URINE INCON ASSESS: CPT | Performed by: FAMILY MEDICINE

## 2022-12-13 PROCEDURE — 1101F PT FALLS ASSESS-DOCD LE1/YR: CPT | Performed by: FAMILY MEDICINE

## 2022-12-13 PROCEDURE — 3017F COLORECTAL CA SCREEN DOC REV: CPT | Performed by: FAMILY MEDICINE

## 2022-12-13 PROCEDURE — 1123F ACP DISCUSS/DSCN MKR DOCD: CPT | Performed by: FAMILY MEDICINE

## 2022-12-13 PROCEDURE — 99213 OFFICE O/P EST LOW 20 MIN: CPT | Performed by: FAMILY MEDICINE

## 2022-12-13 PROCEDURE — G8756 NO BP MEASURE DOC: HCPCS | Performed by: FAMILY MEDICINE

## 2022-12-13 PROCEDURE — G9717 DOC PT DX DEP/BP F/U NT REQ: HCPCS | Performed by: FAMILY MEDICINE

## 2022-12-13 PROCEDURE — G8427 DOCREV CUR MEDS BY ELIG CLIN: HCPCS | Performed by: FAMILY MEDICINE

## 2022-12-13 RX ORDER — AZITHROMYCIN 250 MG/1
TABLET, FILM COATED ORAL
Qty: 6 TABLET | Refills: 0 | Status: SHIPPED | OUTPATIENT
Start: 2022-12-13 | End: 2022-12-18

## 2022-12-13 RX ORDER — INSULIN PUMP SYRINGE, 3 ML
EACH MISCELLANEOUS
Qty: 1 KIT | Refills: 0 | Status: SHIPPED | OUTPATIENT
Start: 2022-12-13

## 2022-12-13 RX ORDER — FLUTICASONE PROPIONATE AND SALMETEROL 500; 50 UG/1; UG/1
1 POWDER RESPIRATORY (INHALATION) EVERY 12 HOURS
Qty: 60 EACH | Refills: 0 | Status: SHIPPED | OUTPATIENT
Start: 2022-12-13

## 2022-12-13 RX ORDER — ALBUTEROL SULFATE 90 UG/1
1 AEROSOL, METERED RESPIRATORY (INHALATION)
Qty: 18 G | Refills: 0 | Status: SHIPPED | OUTPATIENT
Start: 2022-12-13 | End: 2022-12-13

## 2022-12-13 RX ORDER — PROMETHAZINE HYDROCHLORIDE AND DEXTROMETHORPHAN HYDROBROMIDE 6.25; 15 MG/5ML; MG/5ML
5 SYRUP ORAL
Qty: 118 ML | Refills: 0 | Status: SHIPPED | OUTPATIENT
Start: 2022-12-13 | End: 2022-12-20

## 2022-12-13 RX ORDER — ALBUTEROL SULFATE 90 UG/1
AEROSOL, METERED RESPIRATORY (INHALATION)
Qty: 36 G | Refills: 0 | Status: SHIPPED | OUTPATIENT
Start: 2022-12-13

## 2022-12-13 RX ORDER — LANCETS 28 GAUGE
EACH MISCELLANEOUS
Qty: 100 LANCET | Refills: 3 | Status: SHIPPED | OUTPATIENT
Start: 2022-12-13

## 2022-12-13 RX ORDER — BLOOD SUGAR DIAGNOSTIC
STRIP MISCELLANEOUS
Qty: 100 STRIP | Refills: 3 | Status: SHIPPED | OUTPATIENT
Start: 2022-12-13

## 2022-12-13 NOTE — PROGRESS NOTES
1. Have you been to the ER, urgent care clinic since your last visit? Hospitalized since your last visit? No    2. Have you seen or consulted any other health care providers outside of the [address] [place] [address] since your last visit? Include any pap smears or colon screening.  No      Chief Complaint   Patient presents with    Cough     Unable to sleep due to cough  Thinks might be bronchitis   Since Thanksgiving    Uses nebulizer

## 2022-12-13 NOTE — PROGRESS NOTES
Consent: Lit Boyer, who was seen by synchronous (real-time) audio-video technology, and/or her healthcare decision maker, is aware that this patient-initiated, Telehealth encounter on 12/13/2022 is a billable service, with coverage as determined by her insurance carrier. She is aware that she may receive a bill and has provided verbal consent to proceed: Yes. Assessment & Plan:   Diagnoses and all orders for this visit:    1. Acute bronchitis, unspecified organism  -     azithromycin (ZITHROMAX) 250 mg tablet; Take 2 tablets today, then take 1 tablet daily  -     fluticasone propion-salmeteroL (ADVAIR/WIXELA) 500-50 mcg/dose diskus inhaler; Take 1 Puff by inhalation every twelve (12) hours. -     albuterol (PROVENTIL HFA, VENTOLIN HFA, PROAIR HFA) 90 mcg/actuation inhaler; Take 1 Puff by inhalation every four (4) hours as needed for Wheezing.  -     XR CHEST PA LAT; Future  -     promethazine-dextromethorphan (PROMETHAZINE-DM) 6.25-15 mg/5 mL syrup; Take 5 mL by mouth every four (4) hours as needed for Cough for up to 7 days. 2. Impaired mobility          Follow-up and Dispositions    Return if symptoms worsen or fail to improve. I ADVISED PATIENT TO GO TO ER IF SYMPTOMS WORSEN , CHANGE OR FAILS TO IMPROVE. I spent at least 15 minutes with this established patient, and >50% of the time was spent counseling and/or coordinating care regarding SEE BELOW  712  Subjective:   Lit Boyer is a 74 y.o. 1955 female  new patient, who was seen for Cough (Unable to sleep due to cough  Thinks might be bronchitis   Since Thanksgiving    Uses nebulizer  )          1. Impaired mobility  Patient reports she has difficulty walking due to severe peripheral neuropathy. Patient usually moves with wheelchair. Reports she can only take a few steps.     2. Acute bronchitis, unspecified organism  Lit Boyer is a 74 y.o. female who complains of recent onset of nasal congestion, sinus pressure, cough , sputum production, shortness of breath, wheezing,. She denies sore throat, ear fullness and body aches . Patient also noted that OTC remedies did not help. Denies fever. Patient has not been tested for COVID-19 or flu. I have advised her to get tested. Chest x-ray ordered and done today reviewed by me is within normal limits. Prior to Admission medications    Medication Sig Start Date End Date Taking? Authorizing Provider   azithromycin (ZITHROMAX) 250 mg tablet Take 2 tablets today, then take 1 tablet daily 12/13/22 12/18/22 Yes Yanet Larios MD   fluticasone propion-salmeteroL (ADVAIR/WIXELA) 500-50 mcg/dose diskus inhaler Take 1 Puff by inhalation every twelve (12) hours. 12/13/22  Yes Yanet Larios MD   albuterol (PROVENTIL HFA, VENTOLIN HFA, PROAIR HFA) 90 mcg/actuation inhaler Take 1 Puff by inhalation every four (4) hours as needed for Wheezing. 12/13/22  Yes Yanet Larios MD   promethazine-dextromethorphan (PROMETHAZINE-DM) 6.25-15 mg/5 mL syrup Take 5 mL by mouth every four (4) hours as needed for Cough for up to 7 days. 12/13/22 12/20/22 Yes Yanet Larios MD   gabapentin (NEURONTIN) 800 mg tablet TAKE 1 TABLET BY MOUTH FOUR TIMES DAILY AS NEEDED FOR PAIN. MAX DAILY AMOUNT: 3200 MG 10/10/22  Yes Jerod ADAIR NP   levothyroxine (SYNTHROID) 125 mcg tablet TAKE 1 TABLET EVERY DAY BEFORE BREAKFAST (NEED TO MAKE APPOINTMENT FOR FURTHER REFILLS) 10/3/22  Yes Trung Zapien MD   carvediloL (COREG) 25 mg tablet TAKE 1 TABLET TWICE DAILY 9/23/22  Yes Trung Zapien MD   DULoxetine (CYMBALTA) 60 mg capsule Take 1 Capsule by mouth in the morning. 8/3/22  Yes Jillian Villalpando NP   amitriptyline (ELAVIL) 25 mg tablet Take 1 Tablet by mouth nightly.  8/3/22  Yes Jillian Villalpando NP   Blood-Glucose Meter (Blood Glucose Monitoring) monitoring kit E11.8  To check blood sugar daily 7/27/22  Yes Trung Zapien MD   glucose blood VI test strips (FreeStyle Test) strip CHECK BLOOD SUGAR DAILY E11.8 7/27/22  Yes Eva Barrios MD   lancets (FreeStyle Lancets) 28 gauge misc CHECK BLOOD SUGAR ONCE DAILY 7/27/22  Yes Eva Barrios MD   alcohol swabs padm Use daily for blood sugar monitoring 7/17/22  Yes Eva Barrios MD   Blood Glucose Control, Low (True Metrix Level 1) soln Use as directed for glucometer testing 7/17/22  Yes Eva Barrios MD   metFORMIN (GLUCOPHAGE) 500 mg tablet Take 1 Tablet by mouth two (2) times daily (with meals). 7/5/22  Yes Eva Barrios MD   atorvastatin (LIPITOR) 40 mg tablet Take 1 Tablet by mouth daily.  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 R61    Long term (current) use of systemic steroids Z79.52    Long term current use of immunosuppressive drug Z79.899    Essential hypertension I10    Controlled type 2 diabetes mellitus with complication, without long-term current use of insulin (HCC) E11.8    Morbid obesity with BMI of 45.0-49.9, adult ([facility] E66.01, Z68.42    Vitamin D deficiency E55.9    Osteopenia M85.80    Severe episode of recurrent major depressive disorder, without psychotic features ([facility] F33.2    Long-term use of immunosuppressant medication Z79.60    Type 2 diabetes with nephropathy (HCC) E11.21    Age-related osteoporosis without current pathological fracture M81.0    Impaired mobility Z74.09     Patient Active Problem List    Diagnosis Date Noted    Impaired mobility 12/13/2022    Age-related osteoporosis without current pathological fracture 11/27/2019    Type 2 diabetes with nephropathy ([facility] 08/22/2018    Long-term use of immunosuppressant medication 10/24/2017    Morbid obesity with BMI of 45.0-49.9, adult ([facility] 01/17/2017    Vitamin D deficiency 01/17/2017    Osteopenia 01/17/2017    Severe episode of recurrent major depressive disorder, without psychotic features ([facility] 01/17/2017    Controlled type 2 diabetes mellitus with complication, without long-term current use of insulin ([facility] 11/09/2016    Long term (current) use of systemic steroids 11/03/2016    Long term current use of immunosuppressive drug 11/03/2016    Essential hypertension 11/03/2016    Hypercholesterolemia 11/19/2015    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 11/19/2015    Seropositive rheumatoid arthritis of multiple sites ([facility] 07/23/2015    Easy bruising 07/23/2015    Acquired hypothyroidism 07/23/2015    Postmenopausal depression 07/23/2015    Lumbar stenosis with neurogenic claudication 07/07/2014    Arthritis of knee 01/30/2012     Current Outpatient Medications   Medication Sig Dispense Refill    azithromycin (ZITHROMAX) 250 mg tablet Take 2 tablets today, then take 1 tablet daily 6 Tablet 0    fluticasone propion-salmeteroL (ADVAIR/WIXELA) 500-50 mcg/dose diskus inhaler Take 1 Puff by inhalation every twelve (12) hours. 60 Each 0    promethazine-dextromethorphan (PROMETHAZINE-DM) 6.25-15 mg/5 mL syrup Take 5 mL by mouth every four (4) hours as needed for Cough for up to 7 days. 118 mL 0    gabapentin (NEURONTIN) 800 mg tablet TAKE 1 TABLET BY MOUTH FOUR TIMES DAILY AS NEEDED FOR PAIN. MAX DAILY AMOUNT: 3200  Tablet 2    levothyroxine (SYNTHROID) 125 mcg tablet TAKE 1 TABLET EVERY DAY BEFORE BREAKFAST (NEED TO MAKE APPOINTMENT FOR FURTHER REFILLS) 90 Tablet 1    carvediloL (COREG) 25 mg tablet TAKE 1 TABLET TWICE DAILY 180 Tablet 2    DULoxetine (CYMBALTA) 60 mg capsule Take 1 Capsule by mouth in the morning. 90 Capsule 2    amitriptyline (ELAVIL) 25 mg tablet Take 1 Tablet by mouth nightly.  80 Tablet 2    Blood-Glucose Meter (Blood Glucose Monitoring) monitoring kit E11.8  To check blood sugar daily 1 Kit 0    glucose blood VI test strips (FreeStyle Test) strip CHECK BLOOD SUGAR DAILY E11.8 100 Strip 3    lancets (FreeStyle Lancets) 28 gauge misc CHECK BLOOD SUGAR ONCE DAILY 100 Lancet 3    alcohol swabs padm Use daily for blood sugar monitoring 100 Pad 3    Blood Glucose Control, Low (True Metrix Level 1) soln Use as directed for glucometer testing 1 Each 1    metFORMIN (GLUCOPHAGE) 500 mg tablet Take 1 Tablet by mouth two (2) times daily (with meals). 180 Tablet 2    atorvastatin (LIPITOR) 40 mg tablet Take 1 Tablet by mouth daily.  90 Tablet 2    mirtazapine (REMERON) 15 mg tablet TK 1 T PO QD BEFORE BEDTIME  2    albuterol (PROVENTIL HFA, VENTOLIN HFA, PROAIR HFA) 90 mcg/actuation inhaler INHALE 1 PUFF BY MOUTH EVERY 4 HOURS AS NEEDED FOR WHEEZING 36 g 0     No Known Allergies  Past Medical History:   Diagnosis Date    Anxiety     Arrhythmia     Arthritis     RA    Arthritis of knee 1/30/2012    Autoimmune disease (HCC)     RA    Bronchitis     Chronic pain     due to RA    Depression     Dyslipidemia     Morbid obesity (HCC)     Nausea & vomiting     Other screening mammogram 8/19/15    Mammogram in 1 year    Overweight(278.02)     Papanicolaou smear for cervical cancer screening 8/14/15    neg; HPV neg    Rheumatoid aortitis     SVT (supraventricular tachycardia) ([facility] 1993    hx of. underwent cardiac ablation      Past Surgical History:   Procedure Laterality Date    HX GYN      BTL    HX HEENT  6-30-14    root canal    HX HIP REPLACEMENT      2006, 2009    HX HIP REPLACEMENT Right     HX KNEE REPLACEMENT      2012, Dr. Contreras Pac      2 rods and 4 bolts Dr. Valerio Davis  7/2014    HX ORTHOPAEDIC  2005 2009    bilateral total hip replacement    HX ORTHOPAEDIC      left toe    HX ORTHOPAEDIC  2012    bilateral knee replacements    HX ORTHOPAEDIC  2013    left elbow x 5    HX TONSILLECTOMY WA BREAST SURGERY PROCEDURE UNLISTED      right breast mass excision    WA CARDIAC SURG PROCEDURE UNLIST      cardiac ablation for SVT     Family History   Problem Relation Age of Onset    Diabetes Brother     Heart Disease Brother     Diabetes Mother     Heart Disease Father     Breast Cancer Other         Paternal Great Aunt     Social History     Tobacco Use    Smoking status: Former     Packs/day: 0.50     Years: 20.00     Pack years: 10.00     Types: Cigarettes     Quit date: 2013     Years since quittin.9    Smokeless tobacco: Never   Substance Use Topics    Alcohol use: No     Alcohol/week: 0.0 standard drinks       Review of Systems   Constitutional: Negative. HENT:  Positive for congestion. Eyes: Negative. Respiratory:  Positive for cough, sputum production and wheezing. Cardiovascular: Negative. Gastrointestinal: Negative. Genitourinary: Negative. Musculoskeletal: Negative. Skin: Negative. Neurological: Negative. Endo/Heme/Allergies: Negative. Psychiatric/Behavioral: Negative.            Objective:     Patient-Reported Vitals 2022   Patient-Reported Weight 237lbs        [INSTRUCTIONS:  \"[x]\" Indicates a positive item  \"[]\" Indicates a negative item  -- DELETE ALL ITEMS NOT EXAMINED]    Constitutional: [x] Appears well-developed and well-nourished [x] No apparent distress      [] Abnormal -     Mental status: [x] Alert and awake  [x] Oriented to person/place/time [x] Able to follow commands    [] Abnormal -     Eyes:   EOM    [x]  Normal    [] Abnormal -   Sclera  [x]  Normal    [] Abnormal -          Discharge [x]  None visible   [] Abnormal -     HENT: [x] Normocephalic, atraumatic  [] Abnormal -   [x] Mouth/Throat: Mucous membranes are moist    External Ears [x] Normal  [] Abnormal -    Neck: [x] No visualized mass [] Abnormal -     Pulmonary/Chest: [x] Respiratory effort normal   [x] No visualized signs of difficulty breathing or respiratory distress        [] Abnormal -      Musculoskeletal:   [x] Normal gait with no signs of ataxia         [x] Normal range of motion of neck        [] Abnormal -     Neurological:        [x] No Facial Asymmetry (Cranial nerve 7 motor function) (limited exam due to video visit)          [x] No gaze palsy        [] Abnormal -          Skin:        [x] No significant exanthematous lesions or discoloration noted on facial skin         [] Abnormal -            Psychiatric:       [x] Normal Affect [] Abnormal -        [x] No Hallucinations    Other pertinent observable physical exam findings:-    We discussed the expected course, resolution and complications of the diagnosis(es) in detail. Medication risks, benefits, costs, interactions, and alternatives were discussed as indicated. I advised her to contact the office if her condition worsens, changes or fails to improve as anticipated. She expressed understanding with the diagnosis(es) and plan. Lit Boyer is a 74 y.o. female  is being evaluated by a Virtual Visit (video visit) encounter to address concerns as mentioned above. A caregiver was present when appropriate. Due to this being a TeleHealth encounter (During LOZWZ-47 public health emergency), evaluation of the following organ systems was limited: Vitals/Constitutional/EENT/Resp/CV/GI//MS/Neuro/Skin/Heme-Lymph-Imm. Pursuant to the emergency declaration under the [address] [place] [address] authority and the [employer] and [employer] General Act, this Virtual Visit was conducted with patient's (and/or legal guardian's) consent, to reduce the patient's risk of exposure to COVID-19 and provide necessary medical care. The patient (and/or legal guardian) has also been advised to contact this office for worsening conditions or problems, and seek emergency medical treatment and/or call 911 if deemed necessary.     Patient identification was verified at the start of the visit: YES    Services were provided through a video synchronous discussion virtually to substitute for in-person clinic visit. Patient was located at their homes. Provider located in office    An electronic signature was used to authenticate this note.       Jamar Parham MD

## 2022-12-15 ENCOUNTER — TELEPHONE (OUTPATIENT)
Dept: FAMILY MEDICINE CLINIC | Age: 67
End: 2022-12-15

## 2022-12-15 NOTE — TELEPHONE ENCOUNTER
Michoacano Badillo,    Can you send these in?     [facility] is requesting new medications:    -True Metrix Meter Kit w/Devise  -[facility] True Metrix Test Strip (50)  -Trueplus 33G Lancets    мария      PCP: Oneyda Hendrix MD    Last appt: 12/13/2022  Future Appointments   Date Time Provider Kenn Daphnie   2/6/2023  3:00 PM Shyanne Hardwick  [address] BS AMB       Requested Prescriptions      No prescriptions requested or ordered in this encounter       Prior labs and Blood pressures:  BP Readings from Last 3 Encounters:   07/01/21 116/71   05/17/21 (!) 102/55   12/17/19 118/78     Lab Results   Component Value Date/Time    Sodium 136 03/07/2022 01:40 PM    Potassium 4.5 03/07/2022 01:40 PM    Chloride 103 03/07/2022 01:40 PM    CO2 29 03/07/2022 01:40 PM    Anion gap 4 (L) 03/07/2022 01:40 PM    Glucose 115 (H) 03/07/2022 01:40 PM    BUN 24 (H) 03/07/2022 01:40 PM    Creatinine 0.82 03/07/2022 01:40 PM    BUN/Creatinine ratio 29 (H) 03/07/2022 01:40 PM    GFR est AA >60 03/07/2022 01:40 PM    GFR est non-AA >60 03/07/2022 01:40 PM    Calcium 8.8 03/07/2022 01:40 PM     Lab Results   Component Value Date/Time    Hemoglobin A1c 6.7 (H) 03/07/2022 01:40 PM     Lab Results   Component Value Date/Time    Cholesterol, total 197 09/04/2020 12:00 PM    HDL Cholesterol 50 09/04/2020 12:00 PM    LDL, calculated 121 (H) 09/04/2020 12:00 PM    VLDL, calculated 26 09/04/2020 12:00 PM    Triglyceride 130 09/04/2020 12:00 PM    CHOL/HDL Ratio 3.9 09/04/2020 12:00 PM     Lab Results   Component Value Date/Time    Vitamin D 25-Hydroxy 13.4 (L) 03/07/2022 01:40 PM       Lab Results   Component Value Date/Time    TSH 6.92 (H) 03/07/2022 01:40 PM

## 2022-12-19 DIAGNOSIS — E11.8 CONTROLLED TYPE 2 DIABETES MELLITUS WITH COMPLICATION, WITHOUT LONG-TERM CURRENT USE OF INSULIN (HCC): ICD-10-CM

## 2022-12-20 DIAGNOSIS — E11.8 CONTROLLED TYPE 2 DIABETES MELLITUS WITH COMPLICATION, WITHOUT LONG-TERM CURRENT USE OF INSULIN (HCC): Primary | ICD-10-CM

## 2022-12-20 RX ORDER — LANCETS
EACH MISCELLANEOUS
Qty: 90 EACH | Refills: 3 | Status: SHIPPED | OUTPATIENT
Start: 2022-12-20

## 2022-12-20 RX ORDER — INSULIN PUMP SYRINGE, 3 ML
EACH MISCELLANEOUS
Qty: 1 KIT | Refills: 1 | Status: SHIPPED | OUTPATIENT
Start: 2022-12-20

## 2022-12-20 RX ORDER — BLOOD SUGAR DIAGNOSTIC
STRIP MISCELLANEOUS
Qty: 100 STRIP | Refills: 3 | OUTPATIENT
Start: 2022-12-20

## 2022-12-20 RX ORDER — INSULIN PUMP SYRINGE, 3 ML
EACH MISCELLANEOUS
Qty: 1 KIT | Refills: 0 | OUTPATIENT
Start: 2022-12-20

## 2022-12-20 RX ORDER — LANCETS 28 GAUGE
EACH MISCELLANEOUS
Qty: 100 LANCET | Refills: 3 | OUTPATIENT
Start: 2022-12-20

## 2022-12-20 RX ORDER — IBUPROFEN 200 MG
CAPSULE ORAL
Qty: 90 STRIP | Refills: 3 | Status: SHIPPED | OUTPATIENT
Start: 2022-12-20

## 2023-01-24 ENCOUNTER — VIRTUAL VISIT (OUTPATIENT)
Dept: FAMILY MEDICINE CLINIC | Age: 68
End: 2023-01-24
Payer: MEDICARE

## 2023-01-24 DIAGNOSIS — H66.90 ACUTE OTITIS MEDIA, UNSPECIFIED OTITIS MEDIA TYPE: ICD-10-CM

## 2023-01-24 DIAGNOSIS — J20.9 ACUTE BRONCHITIS, UNSPECIFIED ORGANISM: Primary | ICD-10-CM

## 2023-01-24 PROCEDURE — 99213 OFFICE O/P EST LOW 20 MIN: CPT | Performed by: NURSE PRACTITIONER

## 2023-01-24 PROCEDURE — 1090F PRES/ABSN URINE INCON ASSESS: CPT | Performed by: NURSE PRACTITIONER

## 2023-01-24 PROCEDURE — 1101F PT FALLS ASSESS-DOCD LE1/YR: CPT | Performed by: NURSE PRACTITIONER

## 2023-01-24 PROCEDURE — G9717 DOC PT DX DEP/BP F/U NT REQ: HCPCS | Performed by: NURSE PRACTITIONER

## 2023-01-24 PROCEDURE — G8427 DOCREV CUR MEDS BY ELIG CLIN: HCPCS | Performed by: NURSE PRACTITIONER

## 2023-01-24 PROCEDURE — 1123F ACP DISCUSS/DSCN MKR DOCD: CPT | Performed by: NURSE PRACTITIONER

## 2023-01-24 PROCEDURE — 3017F COLORECTAL CA SCREEN DOC REV: CPT | Performed by: NURSE PRACTITIONER

## 2023-01-24 RX ORDER — AMOXICILLIN 500 MG/1
500 CAPSULE ORAL 3 TIMES DAILY
Qty: 30 CAPSULE | Refills: 0 | Status: SHIPPED | OUTPATIENT
Start: 2023-01-24 | End: 2023-02-03

## 2023-01-24 NOTE — PROGRESS NOTES
Brenna Serna is a 69 y.o. female, evaluated via audio-only technology on 1/24/2023 for No chief complaint on file. .    Assessment & Plan:   Diagnoses and all orders for this visit:    1. Acute bronchitis, unspecified organism  -     amoxicillin (AMOXIL) 500 mg capsule; Take 1 Capsule by mouth three (3) times daily for 10 days. - Unchanged, advised patient on the importance of doing over-the-counter symptom management with the product line Coricidin so it does not raise her blood pressure. Advised patient this is a difficult visit over the phone and she really needs to be seen in the office. We will start amoxicillin for this ongoing issue, side effects were discussed. Advised her to get probiotics to take as the risk for changing antibiotics can be diarrhea. Also advised patient to try Flonase. Continue inhaler as directed. Advised patient if symptoms persist for more than 7 days she needs to follow-up in the clinic. Reasons to seek urgent care discussed  2. Otitis media   -Patient with antibiotic advised if symptoms persist needs to follow-up in clinic. Reasons seek urgent care discussed    12  Subjective:   Phone call today for stopped up ear, left ear worse, cannot hear anything out of it. Had drainage from left ear last week  Coughing, cough is dry  Chest xray done a month ago was normal  Going on over a week  Not taking anything over-the-counter  Denies CP, SOB, palpitations, fever    Prior to Admission medications    Medication Sig Start Date End Date Taking?  Authorizing Provider   Blood-Glucose Meter monitoring kit Use to check blood sugar daily 12/20/22   Evan House MD   glucose blood VI test strips (blood glucose test) strip Check blood sugar once daily E11.9 DM2 not on insulin 12/20/22   Evan House MD   lancets misc Use to check blood sugar daily DM2 E 11.9 not on insulin 12/20/22   Evan House MD   fluticasone propion-salmeteroL (ADVAIR/WIXELA) 500-50 mcg/dose diskus inhaler Take 1 Puff by inhalation every twelve (12) hours. 12/13/22   Jeanette Tipton MD   albuterol (PROVENTIL HFA, VENTOLIN HFA, PROAIR HFA) 90 mcg/actuation inhaler INHALE 1 PUFF BY MOUTH EVERY 4 HOURS AS NEEDED FOR WHEEZING 12/13/22   Jeanette Tipton MD   Blood-Glucose Meter (Blood Glucose Monitoring) monitoring kit To check blood sugar daily 12/13/22   Klever Mathews MD   glucose blood VI test strips (FreeStyle Test) strip CHECK BLOOD SUGAR DAILY E11.8 12/13/22   Klever Mathews MD   lancets (FreeStyle Lancets) 28 gauge misc CHECK BLOOD SUGAR ONCE DAILY 12/13/22   Melisa Baker MD   gabapentin (NEURONTIN) 800 mg tablet TAKE 1 TABLET BY MOUTH FOUR TIMES DAILY AS NEEDED FOR PAIN. MAX DAILY AMOUNT: 3200 MG 10/10/22   Rocky ADAIR NP   levothyroxine (SYNTHROID) 125 mcg tablet TAKE 1 TABLET EVERY DAY BEFORE BREAKFAST (NEED TO MAKE APPOINTMENT FOR FURTHER REFILLS) 10/3/22   Grazyna Tinoco MD   carvediloL (COREG) 25 mg tablet TAKE 1 TABLET TWICE DAILY 9/23/22   Grazyna Tinoco MD   DULoxetine (CYMBALTA) 60 mg capsule Take 1 Capsule by mouth in the morning. 8/3/22   Deanna Ayoub NP   amitriptyline (ELAVIL) 25 mg tablet Take 1 Tablet by mouth nightly. 8/3/22   Deanna Ayoub NP   alcohol swabs padm Use daily for blood sugar monitoring 7/17/22   Grazyna Tinoco MD   Blood Glucose Control, Low (True Metrix Level 1) soln Use as directed for glucometer testing 7/17/22   Grazyna Tinoco MD   metFORMIN (GLUCOPHAGE) 500 mg tablet Take 1 Tablet by mouth two (2) times daily (with meals). 7/5/22   Grazyna Tinoco MD   atorvastatin (LIPITOR) 40 mg tablet Take 1 Tablet by mouth daily.  7/5/22   Grazyna Tinoco MD   mirtazapine (REMERON) 15 mg tablet TK 1 T PO QD BEFORE BEDTIME 11/6/19   Provider, Historical     Patient Active Problem List   Diagnosis Code    Arthritis of knee M17.10    Lumbar stenosis with neurogenic claudication M48.062    Seropositive rheumatoid arthritis of multiple sites ([address] M05.79 Easy bruising R23.3    Acquired hypothyroidism E03.9    Postmenopausal depression F32.89    Hypercholesterolemia E78.00    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 R61    Long term (current) use of systemic steroids Z79.52    Long term current use of immunosuppressive drug Z79.899    Essential hypertension I10    Controlled type 2 diabetes mellitus with complication, without long-term current use of insulin (HCC) E11.8    Morbid obesity with BMI of 45.0-49.9, adult ([facility]) E66.01, Z68.42    Vitamin D deficiency E55.9    Osteopenia M85.80    Severe episode of recurrent major depressive disorder, without psychotic features ([facility]) F33.2    Long-term use of immunosuppressant medication Z79.60    Type 2 diabetes with nephropathy ([facility]) E11.21    Age-related osteoporosis without current pathological fracture M81.0    Impaired mobility Z74.09       Review of Systems   Constitutional:  Negative for chills, fever and malaise/fatigue. HENT:  Positive for ear discharge. Negative for congestion, ear pain and sinus pain. Eyes:  Negative for blurred vision. Respiratory:  Positive for cough. Negative for sputum production and shortness of breath. Cardiovascular:  Negative for chest pain, palpitations and leg swelling. Neurological:  Negative for dizziness and headaches. No data recorded     Gt Bedolla was evaluated through a patient-initiated, synchronous (real-time) audio only encounter. She (or guardian if applicable) is aware that it is a billable service, which includes applicable co-pays, with coverage as determined by her insurance carrier. This visit was conducted with the patient's (and/or Dre Screws guardian's) verbal consent. She has not had a related appointment within my department in the past 7 days or scheduled within the next 24 hours. The patient was located in a state where the provider was licensed to provide care.   The patient was located at: Home: [address] [facility] [place], [place] [address] [address]  The provider was located at:  [facility]t Department[facility]  [place] [address] [address]    Total Time: minutes: 5-10 minutes    Luca Gordillo NP

## 2023-02-09 ENCOUNTER — TELEPHONE (OUTPATIENT)
Dept: NEUROLOGY | Age: 68
End: 2023-02-09

## 2023-03-04 DIAGNOSIS — G62.9 PERIPHERAL POLYNEUROPATHY: ICD-10-CM

## 2023-03-04 DIAGNOSIS — F33.2 SEVERE EPISODE OF RECURRENT MAJOR DEPRESSIVE DISORDER, WITHOUT PSYCHOTIC FEATURES (HCC): ICD-10-CM

## 2023-03-10 DIAGNOSIS — G62.9 PERIPHERAL POLYNEUROPATHY: ICD-10-CM

## 2023-03-13 RX ORDER — AMITRIPTYLINE HYDROCHLORIDE 25 MG/1
TABLET, FILM COATED ORAL
Qty: 90 TABLET | Refills: 2 | Status: SHIPPED | OUTPATIENT
Start: 2023-03-13

## 2023-03-13 RX ORDER — GABAPENTIN 800 MG/1
TABLET ORAL
Qty: 360 TABLET | Refills: 1 | Status: SHIPPED | OUTPATIENT
Start: 2023-03-13 | End: 2023-03-14 | Stop reason: SDUPTHER

## 2023-03-13 RX ORDER — DULOXETIN HYDROCHLORIDE 60 MG/1
60 CAPSULE, DELAYED RELEASE ORAL DAILY
Qty: 90 CAPSULE | Refills: 2 | Status: SHIPPED | OUTPATIENT
Start: 2023-03-13

## 2023-03-14 ENCOUNTER — TELEPHONE (OUTPATIENT)
Dept: NEUROLOGY | Age: 68
End: 2023-03-14

## 2023-03-14 DIAGNOSIS — G62.9 PERIPHERAL POLYNEUROPATHY: ICD-10-CM

## 2023-03-14 RX ORDER — GABAPENTIN 800 MG/1
TABLET ORAL
Qty: 120 TABLET | Refills: 1 | Status: SHIPPED | OUTPATIENT
Start: 2023-03-14

## 2023-03-14 NOTE — TELEPHONE ENCOUNTER
Gabapentin, please send to [facility] on [address]. Even if just a few pills to get her over until her rx is mailed. Pt only has two pills left.

## 2023-03-31 DIAGNOSIS — J20.9 ACUTE BRONCHITIS, UNSPECIFIED ORGANISM: ICD-10-CM

## 2023-04-03 ENCOUNTER — VIRTUAL VISIT (OUTPATIENT)
Dept: FAMILY MEDICINE CLINIC | Age: 68
End: 2023-04-03

## 2023-04-03 ENCOUNTER — HOSPITAL ENCOUNTER (EMERGENCY)
Age: 68
Discharge: HOME | End: 2023-04-03
Attending: EMERGENCY MEDICINE
Payer: MEDICARE

## 2023-04-03 ENCOUNTER — APPOINTMENT (OUTPATIENT)
Dept: GENERAL RADIOLOGY | Age: 68
End: 2023-04-03
Attending: EMERGENCY MEDICINE
Payer: MEDICARE

## 2023-04-03 DIAGNOSIS — R06.2 WHEEZING: ICD-10-CM

## 2023-04-03 DIAGNOSIS — J43.9 PULMONARY EMPHYSEMA, UNSPECIFIED EMPHYSEMA TYPE (HCC): Primary | ICD-10-CM

## 2023-04-03 LAB
ALBUMIN SERPL-MCNC: 3.7 G/DL (ref 3.5–5.2)
ALBUMIN/GLOB SERPL: 0.7 (ref 1.1–2.2)
ALP SERPL-CCNC: 130 U/L (ref 35–104)
ALT SERPL-CCNC: 18 U/L (ref 10–35)
ANION GAP SERPL CALC-SCNC: 11 MMOL/L (ref 5–15)
AST SERPL-CCNC: 19 U/L (ref 10–35)
ATRIAL RATE: 102 BPM
BASOPHILS # BLD: 0.1 K/UL (ref 0–1)
BASOPHILS NFR BLD: 1 % (ref 0–1)
BILIRUB SERPL-MCNC: 0.3 MG/DL (ref 0.2–1)
BNP SERPL-MCNC: 70 PG/ML
BUN SERPL-MCNC: 12 MG/DL (ref 8–23)
BUN/CREAT SERPL: 20 (ref 12–20)
CALCIUM SERPL-MCNC: 9.3 MG/DL (ref 8.8–10.2)
CALCULATED P AXIS, ECG09: 67 DEGREES
CALCULATED R AXIS, ECG10: 64 DEGREES
CALCULATED T AXIS, ECG11: 54 DEGREES
CHLORIDE SERPL-SCNC: 102 MMOL/L (ref 98–107)
CO2 SERPL-SCNC: 28 MMOL/L (ref 22–29)
COMMENT, HOLDF: NORMAL
CREAT SERPL-MCNC: 0.61 MG/DL (ref 0.5–0.9)
DIAGNOSIS, 93000: NORMAL
DIFFERENTIAL METHOD BLD: ABNORMAL
EOSINOPHIL # BLD: 0.5 K/UL (ref 0–0.4)
EOSINOPHIL NFR BLD: 5 %
ERYTHROCYTE [DISTWIDTH] IN BLOOD BY AUTOMATED COUNT: 14.5 % (ref 11.5–14.5)
GLOBULIN SER CALC-MCNC: 5.1 G/DL (ref 2–4)
GLUCOSE SERPL-MCNC: 150 MG/DL (ref 65–100)
HCT VFR BLD AUTO: 43.2 % (ref 35–47)
HGB BLD-MCNC: 13.5 G/DL (ref 11.5–16)
IMM GRANULOCYTES # BLD AUTO: 0.1 K/UL (ref 0–0.04)
IMM GRANULOCYTES NFR BLD AUTO: 1 % (ref 0–0.5)
LYMPHOCYTES # BLD: 2.3 K/UL (ref 0.8–3.5)
LYMPHOCYTES NFR BLD: 22 % (ref 12–49)
MCH RBC QN AUTO: 29.2 PG (ref 26–34)
MCHC RBC AUTO-ENTMCNC: 31.3 G/DL (ref 30–36.5)
MCV RBC AUTO: 93.3 FL (ref 80–99)
MONOCYTES # BLD: 0.8 K/UL (ref 0–1)
MONOCYTES NFR BLD: 7 % (ref 5–13)
NEUTS SEG # BLD: 6.9 K/UL (ref 1.8–8)
NEUTS SEG NFR BLD: 64 % (ref 32–75)
NRBC # BLD: 0 K/UL (ref 0–0.01)
NRBC BLD-RTO: 0 PER 100 WBC
P-R INTERVAL, ECG05: 142 MS
PLATELET # BLD AUTO: 295 K/UL (ref 150–400)
PMV BLD AUTO: 10.2 FL (ref 8.9–12.9)
POTASSIUM SERPL-SCNC: 4.4 MMOL/L (ref 3.5–5.1)
PROT SERPL-MCNC: 8.8 G/DL (ref 6.4–8.3)
Q-T INTERVAL, ECG07: 342 MS
QRS DURATION, ECG06: 78 MS
QTC CALCULATION (BEZET), ECG08: 445 MS
RBC # BLD AUTO: 4.63 M/UL (ref 3.8–5.2)
SAMPLES BEING HELD,HOLD: NORMAL
SODIUM SERPL-SCNC: 141 MMOL/L (ref 136–145)
TROPONIN I BLD-MCNC: <0.04 NG/ML (ref 0–0.08)
VENTRICULAR RATE, ECG03: 102 BPM
WBC # BLD AUTO: 10.6 K/UL (ref 3.6–11)

## 2023-04-03 PROCEDURE — 85025 COMPLETE CBC W/AUTO DIFF WBC: CPT

## 2023-04-03 PROCEDURE — 36415 COLL VENOUS BLD VENIPUNCTURE: CPT

## 2023-04-03 PROCEDURE — 71045 X-RAY EXAM CHEST 1 VIEW: CPT

## 2023-04-03 PROCEDURE — 80053 COMPREHEN METABOLIC PANEL: CPT

## 2023-04-03 PROCEDURE — 83880 ASSAY OF NATRIURETIC PEPTIDE: CPT

## 2023-04-03 PROCEDURE — 99285 EMERGENCY DEPT VISIT HI MDM: CPT

## 2023-04-03 PROCEDURE — 74011000250 HC RX REV CODE- 250: Performed by: EMERGENCY MEDICINE

## 2023-04-03 PROCEDURE — 93005 ELECTROCARDIOGRAM TRACING: CPT

## 2023-04-03 RX ORDER — ALBUTEROL SULFATE 0.83 MG/ML
2.5 SOLUTION RESPIRATORY (INHALATION)
Qty: 25 EACH | Refills: 1 | Status: SHIPPED | OUTPATIENT
Start: 2023-04-03 | End: 2023-04-03 | Stop reason: SDUPTHER

## 2023-04-03 RX ORDER — ALBUTEROL SULFATE 0.83 MG/ML
2.5 SOLUTION RESPIRATORY (INHALATION)
Qty: 25 EACH | Refills: 1 | Status: SHIPPED | OUTPATIENT
Start: 2023-04-03

## 2023-04-03 RX ORDER — FLUTICASONE PROPIONATE AND SALMETEROL 500; 50 UG/1; UG/1
POWDER RESPIRATORY (INHALATION)
Qty: 60 EACH | Refills: 0 | Status: SHIPPED | OUTPATIENT
Start: 2023-04-03

## 2023-04-03 RX ORDER — ALBUTEROL SULFATE 0.83 MG/ML
2.5 SOLUTION RESPIRATORY (INHALATION)
Status: COMPLETED | OUTPATIENT
Start: 2023-04-03 | End: 2023-04-03

## 2023-04-03 RX ORDER — IPRATROPIUM BROMIDE AND ALBUTEROL SULFATE 2.5; .5 MG/3ML; MG/3ML
3 SOLUTION RESPIRATORY (INHALATION)
Status: COMPLETED | OUTPATIENT
Start: 2023-04-03 | End: 2023-04-03

## 2023-04-03 RX ORDER — ALBUTEROL SULFATE 90 UG/1
AEROSOL, METERED RESPIRATORY (INHALATION)
Qty: 36 G | Refills: 0 | Status: SHIPPED | OUTPATIENT
Start: 2023-04-03

## 2023-04-03 RX ADMIN — IPRATROPIUM BROMIDE AND ALBUTEROL SULFATE 3 ML: 2.5; .5 SOLUTION RESPIRATORY (INHALATION) at 13:32

## 2023-04-03 RX ADMIN — ALBUTEROL SULFATE 2.5 MG: 2.5 SOLUTION RESPIRATORY (INHALATION) at 15:07

## 2023-04-03 NOTE — ED PROVIDER NOTES
Date of Service:  4/3/2023    Patient:  Shivam Rainey    Chief Complaint:  Cough       HPI:  Shivam Rainey is a 69 y.o.  female who presents for evaluation of cough and shortness of breath. Patient states that since November she is been having issues. She notes that she has been seen by the doctors multiple times. She denies any known issues with her lung such as COPD CHF or asthma. Patient notes wheezing cough and shortness of breath intermittently gets better and worse but over the last several weeks has been getting worse. Today she notes that she is \"tired of it. \"       Past Medical History:   Diagnosis Date    Anxiety     Arrhythmia     Arthritis     RA    Arthritis of knee 1/30/2012    Autoimmune disease ([facility] [address]     RA    Bronchitis     Chronic pain     due to RA    Depression     Dyslipidemia     Morbid obesity ([address]     Nausea & vomiting     Other screening mammogram 8/19/15    Mammogram in 1 year    Overweight(278.02)     Papanicolaou smear for cervical cancer screening 8/14/15    neg; HPV neg    Rheumatoid aortitis     SVT (supraventricular tachycardia) ([facility] [address] 1993    hx of. underwent cardiac ablation        Past Surgical History:   Procedure Laterality Date    HX GYN      BTL    HX HEENT  6-30-14    root canal    HX HIP REPLACEMENT      2006, 2009    HX HIP REPLACEMENT Right     HX KNEE REPLACEMENT      2012, Dr. Cyril Juan      2 rods and 4 bolts Dr. Sarah De La Torre  7/2014    HX ORTHOPAEDIC  2005 2009    bilateral total hip replacement    HX ORTHOPAEDIC      left toe    HX ORTHOPAEDIC  2012    bilateral knee replacements    HX ORTHOPAEDIC  2013    left elbow x 5    HX TONSILLECTOMY      GA UNLISTED PROCEDURE BREAST      right breast mass excision    GA UNLISTED PROCEDURE CARDIAC SURGERY  1993    cardiac ablation for SVT         Family History:   Problem Relation Age of Onset    Diabetes Brother     Heart Disease Brother     Diabetes Mother     Heart Disease Father     Breast Cancer Other         Paternal Great Aunt       Social History     Socioeconomic History    Marital status: SINGLE     Spouse name: Not on file    Number of children: Not on file    Years of education: Not on file    Highest education level: Not on file   Occupational History    Not on file   Tobacco Use    Smoking status: Former     Packs/day: 0.50     Years: 20.00     Pack years: 10.00     Types: Cigarettes     Quit date: 1/1/2013     Years since quitting: 10.2    Smokeless tobacco: Never   Vaping Use    Vaping Use: Never used   Substance and Sexual Activity    Alcohol use: No     Alcohol/week: 0.0 standard drinks    Drug use: No    Sexual activity: Never   Other Topics Concern    Not on file   Social History Narrative    Not on file     Social Determinants of Health     Financial Resource Strain: Not on file   Food Insecurity: Not on file   Transportation Needs: Not on file   Physical Activity: Not on file   Stress: Not on file   Social Connections: Not on file   Intimate Partner Violence: Not on file   Housing Stability: Not on file         ALLERGIES: Patient has no known allergies. Review of Systems   All other systems reviewed and are negative. Vitals:    04/03/23 1323   BP: (!) 166/85   Pulse: (!) 110   Resp: 20   Temp: 98.5 °F (36.9 °C)   SpO2: 95%   Weight: 104.3 kg (230 lb)   Height: 5' 3\" (1.6 m)            Physical Exam  Vitals and nursing note reviewed. Constitutional:       Appearance: Normal appearance. HENT:      Head: Normocephalic and atraumatic. Nose: Nose normal.      Mouth/Throat:      Mouth: Mucous membranes are moist.   Eyes:      General: No scleral icterus. Cardiovascular:      Rate and Rhythm: Normal rate and regular rhythm. Pulmonary:      Effort: Pulmonary effort is normal.      Breath sounds: Wheezing present. Abdominal:      Tenderness: There is no abdominal tenderness. Musculoskeletal:         General: No tenderness or deformity. Right lower leg: No edema.       Left lower leg: No edema. Skin:     General: Skin is warm. Neurological:      Mental Status: She is alert and oriented to person, place, and time. Psychiatric:         Mood and Affect: Mood normal.        Medical Decision Making  Amount and/or Complexity of Data Reviewed  Labs: ordered. Decision-making details documented in ED Course. Radiology: ordered. Decision-making details documented in ED Course. ECG/medicine tests: ordered and independent interpretation performed. Decision-making details documented in ED Course. Risk  Prescription drug management. ED Course as of 04/03/23 1738   Mon Apr 03, 2023   1327 EKG 1322  Independent evaluation shows sinus tachycardia at 102 bpm with normal axis and intervals. No STEMI [GG]      ED Course User Index  [GG] Andrew Rock DO     VITAL SIGNS:  Patient Vitals for the past 4 hrs:   Pulse BP SpO2   04/03/23 1530 -- 122/78 92 %   04/03/23 1519 -- -- 95 %   04/03/23 1515 -- (!) 159/71 98 %   04/03/23 1507 90 125/81 --   04/03/23 1444 -- 125/89 92 %   04/03/23 1429 -- 119/75 91 %   04/03/23 1414 -- 135/79 92 %   04/03/23 1345 -- 134/79 97 %           LABS:  The Following labs have been ordered while in the emergency department and I have independently evaluated them.      Recent Results (from the past 6 hour(s))   EKG, 12 LEAD, INITIAL    Collection Time: 04/03/23  1:22 PM   Result Value Ref Range    Ventricular Rate 102 BPM    Atrial Rate 102 BPM    P-R Interval 142 ms    QRS Duration 78 ms    Q-T Interval 342 ms    QTC Calculation (Bezet) 445 ms    Calculated P Axis 67 degrees    Calculated R Axis 64 degrees    Calculated T Axis 54 degrees    Diagnosis       Sinus tachycardia  Otherwise normal ECG  When compared with ECG of 11-MAY-2016 11:35,  MANUAL COMPARISON REQUIRED, DATA IS UNCONFIRMED     METABOLIC PANEL, COMPREHENSIVE    Collection Time: 04/03/23  1:25 PM   Result Value Ref Range    Sodium 141 136 - 145 mmol/L    Potassium 4.4 3.5 - 5.1 mmol/L Chloride 102 98 - 107 mmol/L    CO2 28 22 - 29 mmol/L    Anion gap 11 5 - 15 mmol/L    Glucose 150 (H) 65 - 100 mg/dL    BUN 12 8 - 23 MG/DL    Creatinine 0.61 0.50 - 0.90 MG/DL    BUN/Creatinine ratio 20 12 - 20      eGFR >60 >60 ml/min/1.73m2    Calcium 9.3 8.8 - 10.2 MG/DL    Bilirubin, total 0.3 0.2 - 1.0 MG/DL    ALT (SGPT) 18 10 - 35 U/L    AST (SGOT) 19 10 - 35 U/L    Alk. phosphatase 130 (H) 35 - 104 U/L    Protein, total 8.8 (H) 6.4 - 8.3 g/dL    Albumin 3.7 3.5 - 5.2 g/dL    Globulin 5.1 (H) 2.0 - 4.0 g/dL    A-G Ratio 0.7 (L) 1.1 - 2.2     NT-PRO BNP    Collection Time: 04/03/23  1:25 PM   Result Value Ref Range    NT pro-BNP 70 <126 PG/ML   SAMPLES BEING HELD    Collection Time: 04/03/23  1:25 PM   Result Value Ref Range    SAMPLES BEING HELD 1LAV 1BLUE     COMMENT        Add-on orders for these samples will be processed based on acceptable specimen integrity and analyte stability, which may vary by analyte. CBC WITH AUTOMATED DIFF    Collection Time: 04/03/23  1:25 PM   Result Value Ref Range    WBC 10.6 3.6 - 11.0 K/uL    RBC 4.63 3.80 - 5.20 M/uL    HGB 13.5 11.5 - 16.0 g/dL    HCT 43.2 35.0 - 47.0 %    MCV 93.3 80.0 - 99.0 FL    MCH 29.2 26.0 - 34.0 PG    MCHC 31.3 30.0 - 36.5 g/dL    RDW 14.5 11.5 - 14.5 %    PLATELET 205 170 - 908 K/uL    MPV 10.2 8.9 - 12.9 FL    NRBC 0.0 0  WBC    ABSOLUTE NRBC 0.00 0.00 - 0.01 K/uL    NEUTROPHILS 64 32 - 75 %    LYMPHOCYTES 22 12 - 49 %    MONOCYTES 7 5 - 13 %    EOSINOPHILS 5 (L) 7 %    BASOPHILS 1 0 - 1 %    IMMATURE GRANULOCYTES 1 (H) 0 - 0.5 %    ABS. NEUTROPHILS 6.9 1.8 - 8.0 K/UL    ABS. LYMPHOCYTES 2.3 0.8 - 3.5 K/UL    ABS. MONOCYTES 0.8 0.0 - 1.0 K/UL    ABS. EOSINOPHILS 0.5 (H) 0.0 - 0.4 K/UL    ABS. BASOPHILS 0.1 0 - 1 K/UL    ABS. IMM.  GRANS. 0.1 (H) 0.00 - 0.04 K/UL    DF AUTOMATED     POC TROPONIN-I    Collection Time: 04/03/23  1:34 PM   Result Value Ref Range    Troponin-I (POC) <0.04 0.00 - 0.08 ng/mL          IMAGING:  The Following imaging studies have been ordered while in the emergency department and I have reviewed them. XR CHEST PORT   Final Result   Clear lungs. Probable emphysema. Medications During Visit:  I ordered/approved the ordering of the following medicines for the patient while in the emergency department. Medications   albuterol-ipratropium (DUO-NEB) 2.5 MG-0.5 MG/3 ML (3 mL Nebulization Given 4/3/23 1332)   albuterol (PROVENTIL VENTOLIN) nebulizer solution 2.5 mg (2.5 mg Nebulization Given 4/3/23 1507)         DECISION MAKING:  Tereso Lozano is a 69 y.o. female who comes in as above. Well-appearing patient. She arrives wheezing but no active distress. She is provided with several albuterol treatments and now states that she is feeling much better. Most likely the patient's developing emphysema based on the x-ray findings. She probably has underlying emphysema/COPD for some time given her former smoking status. Will provide patient with a refill for her albuterol. No steroids given her diabetic status and have patient follow-up with pulmonary and her PCP. She is agreeable to this plan. Because of the COPD could be pollen/allergies versus other viral cause      IMPRESSION:  1. Pulmonary emphysema, unspecified emphysema type ([facility] [address]    2.  Wheezing        DISPOSITION:  Discharged      Discharge Medication List as of 4/3/2023  3:21 PM        START taking these medications    Details   albuterol (PROVENTIL VENTOLIN) 2.5 mg /3 mL (0.083 %) nebu 3 mL by Nebulization route every four (4) hours as needed for Wheezing., Normal, Disp-25 Each, R-1           CONTINUE these medications which have NOT CHANGED    Details   albuterol (PROVENTIL HFA, VENTOLIN HFA, PROAIR HFA) 90 mcg/actuation inhaler INHALE 1 PUFF BY MOUTH EVERY 4 HOURS AS NEEDED FOR WHEEZING, Normal, Disp-36 g, R-0      Wixela Inhub 500-50 mcg/dose diskus inhaler INHALE 1 PUFF BY MOUTH EVERY 12 HOURS, Normal, Disp-60 Each, R-0      gabapentin (NEURONTIN) 800 mg tablet TAKE 1 TABLET FOUR TIMES DAILY AS NEEDED FOR PAIN (MAX DAILY AMOUNT: 3200MG), Normal, Disp-120 Tablet, R-1      DULoxetine (CYMBALTA) 60 mg capsule TAKE 1 CAPSULE BY MOUTH IN THE MORNING., Normal, Disp-90 Capsule, R-2      amitriptyline (ELAVIL) 25 mg tablet TAKE 1 TABLET EVERY NIGHT, Normal, Disp-90 Tablet, R-2      metFORMIN (GLUCOPHAGE) 500 mg tablet TAKE 1 TABLET TWICE DAILY WITH MEALS, Normal, Disp-180 Tablet, R-0      atorvastatin (LIPITOR) 40 mg tablet TAKE 1 TABLET EVERY DAY, Normal, Disp-90 Tablet, R-0      !! Blood-Glucose Meter monitoring kit Use to check blood sugar daily, Normal, Disp-1 Kit, R-1True Metrix      !! glucose blood VI test strips (blood glucose test) strip Check blood sugar once daily E11.9 DM2 not on insulin, Normal, Disp-90 Strip, R-3May substitute True Metrix      !! lancets misc Use to check blood sugar daily DM2 E 11.9 not on insulin, Normal, Disp-90 Each, R-3True Plus 33 gauge      !! Blood-Glucose Meter (Blood Glucose Monitoring) monitoring kit To check blood sugar daily, Normal, Disp-1 Kit, R-0      !! glucose blood VI test strips (FreeStyle Test) strip CHECK BLOOD SUGAR DAILY E11.8, Normal, Disp-100 Strip, R-3      !! lancets (FreeStyle Lancets) 28 gauge misc CHECK BLOOD SUGAR ONCE DAILY, Normal, Disp-100 Lancet, R-3      levothyroxine (SYNTHROID) 125 mcg tablet TAKE 1 TABLET EVERY DAY BEFORE BREAKFAST (NEED TO MAKE APPOINTMENT FOR FURTHER REFILLS), Normal, Disp-90 Tablet, R-1      carvediloL (COREG) 25 mg tablet TAKE 1 TABLET TWICE DAILY, Normal, Disp-180 Tablet, R-2      alcohol swabs padm Use daily for blood sugar monitoring, Normal, Disp-100 Pad, R-3      Blood Glucose Control, Low (True Metrix Level 1) soln Use as directed for glucometer testing, Normal, Disp-1 Each, R-1      mirtazapine (REMERON) 15 mg tablet TK 1 T PO QD BEFORE BEDTIME, Historical Med, R-2       !! - Potential duplicate medications found. Please discuss with provider. Follow-up Information       Follow up With Specialties Details Why Contact Info    [address]  Schedule an appointment as soon as possible for a visit   [address] [place] [address] [phone]    [facility]  Call  As needed [address]  [address]  [phone]              The patient is asked to follow-up with their primary care provider in the next several days. They are to call tomorrow for an appointment. The patient is asked to return promptly for any increased concerns or worsening of symptoms. They can return to this emergency department or any other emergency department.       Procedures

## 2023-04-03 NOTE — PROGRESS NOTES
Patient unable to start video after multiple attempts. I called patient and advised her to go to urgent care for evaluation for reschedule to be seen in person. Patient will go to urgent care to be evaluated in person.

## 2023-04-03 NOTE — ED NOTES
Discharge instructions reviewed with pt. Opportunity for questions provided.  Pt leaves via personal WC to waiting room

## 2023-04-03 NOTE — ED TRIAGE NOTES
Pt arrives to ER with c/o cough and SOB/tightness in chest x 3 weeks. Pt reports trying OTC medications with no help.

## 2023-04-03 NOTE — PROGRESS NOTES
Julianne Rebollar is a 69 y.o. female      Chief Complaint   Patient presents with    URI         1. Have you been to the ER, urgent care clinic since your last visit? no Hospitalized since your last visit? 2. Have you seen or consulted any other health care providers outside of the [address] [place] [address] since your last visit? Include any pap smears or colon screening.  No

## 2023-04-23 DIAGNOSIS — Z12.39 ENCOUNTER FOR OTHER SCREENING FOR MALIGNANT NEOPLASM OF BREAST: Primary | ICD-10-CM

## 2023-04-23 DIAGNOSIS — Z12.31 SCREENING MAMMOGRAM FOR HIGH-RISK PATIENT: ICD-10-CM

## 2023-04-24 DIAGNOSIS — M81.0 AGE-RELATED OSTEOPOROSIS WITHOUT CURRENT PATHOLOGICAL FRACTURE: Primary | ICD-10-CM

## 2023-04-28 DIAGNOSIS — E11.8 CONTROLLED TYPE 2 DIABETES MELLITUS WITH COMPLICATION, WITHOUT LONG-TERM CURRENT USE OF INSULIN (HCC): ICD-10-CM

## 2023-05-01 RX ORDER — LANCETS 28 GAUGE
EACH MISCELLANEOUS
OUTPATIENT
Start: 2023-05-01

## 2023-05-15 RX ORDER — LEVOTHYROXINE SODIUM 0.12 MG/1
125 TABLET ORAL DAILY
Qty: 90 TABLET | Refills: 1 | Status: SHIPPED | OUTPATIENT
Start: 2023-05-15

## 2023-05-18 ENCOUNTER — TRANSCRIBE ORDERS (OUTPATIENT)
Facility: HOSPITAL | Age: 68
End: 2023-05-18

## 2023-05-18 DIAGNOSIS — Z12.31 ENCOUNTER FOR SCREENING MAMMOGRAM FOR MALIGNANT NEOPLASM OF BREAST: Primary | ICD-10-CM

## 2023-07-11 NOTE — TELEPHONE ENCOUNTER
PCP: Alberta Esteves MD    Last appt: 01/24/23  Last labs for CBC:  04/03/23  Last A1C:  12/27/22      No future appointments.     Requested Prescriptions     Pending Prescriptions Disp Refills    metFORMIN (GLUCOPHAGE) 500 MG tablet [Pharmacy Med Name: METFORMIN HYDROCHLORIDE 500 MG Tablet] 180 tablet      Sig: TAKE 1 TABLET TWICE DAILY WITH MEALS    atorvastatin (LIPITOR) 40 MG tablet [Pharmacy Med Name: ATORVASTATIN CALCIUM 40 MG Tablet] 90 tablet      Sig: TAKE 1 TABLET EVERY DAY    carvedilol (COREG) 25 MG tablet [Pharmacy Med Name: CARVEDILOL 25 MG Tablet] 180 tablet      Sig: TAKE 1 TABLET TWICE DAILY       Prior labs and Blood pressures:  BP Readings from Last 3 Encounters:   07/01/21 116/71   05/17/21 (!) 102/55     Lab Results   Component Value Date/Time     04/03/2023 01:25 PM    K 4.4 04/03/2023 01:25 PM     04/03/2023 01:25 PM    CO2 28 04/03/2023 01:25 PM    BUN 12 04/03/2023 01:25 PM    GFRAA >60 03/07/2022 01:40 PM     No results found for: HBA1C, [id number]  Lab Results   Component Value Date/Time    CHOL 176 12/27/2022 02:29 PM    HDL 45 12/27/2022 02:29 PM     No results found for: VITD3, VD3RIA    Lab Results   Component Value Date/Time    TSH 2.86 12/27/2022 02:29 PM

## 2023-07-12 RX ORDER — CARVEDILOL 25 MG/1
TABLET ORAL
Qty: 180 TABLET | Refills: 1 | Status: SHIPPED | OUTPATIENT
Start: 2023-07-12

## 2023-07-12 RX ORDER — ATORVASTATIN CALCIUM 40 MG/1
TABLET, FILM COATED ORAL
Qty: 90 TABLET | Refills: 1 | Status: SHIPPED | OUTPATIENT
Start: 2023-07-12

## 2023-07-27 RX ORDER — GLUCOSAM/CHON-MSM1/C/MANG/BOSW 500-416.6
TABLET ORAL
Qty: 100 EACH | Refills: 0 | Status: SHIPPED | OUTPATIENT
Start: 2023-07-27

## 2023-07-28 NOTE — TELEPHONE ENCOUNTER
Call patient. I refilled their medication but they are due to be seen in the office. Reason:  Diabetes and labs  Must be seen for in person appointment for any additional refills.   [facility]
LVM
Maintenance Due   Topic Date Due    COVID-19 Vaccine (1) Never done    DTaP/Tdap/Td vaccine (1 - Tdap) Never done    Colorectal Cancer Screen  Never done    Breast cancer screen  Never done    Shingles vaccine (1 of 2) Never done    Diabetic retinal exam  03/07/2019    Diabetic foot exam  12/17/2020    Diabetic Alb to Cr ratio (uACR) test  09/04/2021    Annual Wellness Visit (AWV)  05/23/2022

## 2023-08-04 NOTE — ACP (ADVANCE CARE PLANNING)
Advance Care Planning    Advance Care Planning (ACP) Provider Conversation Snapshot    Date of ACP Conversation: 07/17/17  Persons included in Conversation:  patient  Length of ACP Conversation in minutes:  16 minutes    Authorized Decision Maker (if patient is incapable of making informed decisions): This person is: Other Legally Authorized Decision Maker (e.g. Next of Kin)          For Patients with Decision Making Capacity:   Values/Goals: Exploration of values, goals, and preferences if recovery is not expected, even with continued medical treatment in the event of:  Imminent death  Severe, permanent brain injury  \"In these circumstances, what matters most to you? \"  Care focused more on comfort or quality of life.     Conversation Outcomes / Follow-Up Plan:   Completed new Advance Directive
No

## 2023-10-27 RX ORDER — GABAPENTIN 800 MG/1
TABLET ORAL
Qty: 120 TABLET | OUTPATIENT
Start: 2023-10-27

## 2023-12-04 NOTE — TELEPHONE ENCOUNTER
PCP: Juliette Leung MD    Last appt: [unfilled]  No future appointments.    Requested Prescriptions     Pending Prescriptions Disp Refills    TRUE METRIX BLOOD GLUCOSE TEST strip [Pharmacy Med Name: TRUE METRIX SELF MONITORING BLOOD GLUCOSE STRIPS   Strip] 100 strip 3     Sig: CHECK BLOOD SUGAR ONCE DAILY       Prior labs and Blood pressures:  BP Readings from Last 3 Encounters:   07/01/21 116/71   05/17/21 (!) 102/55     Lab Results   Component Value Date/Time     04/03/2023 01:25 PM    K 4.4 04/03/2023 01:25 PM     04/03/2023 01:25 PM    CO2 28 04/03/2023 01:25 PM    BUN 12 04/03/2023 01:25 PM    GFRAA >60 03/07/2022 01:40 PM     No results found for: \"HBA1C\", \"ENE8TCOS\"  Lab Results   Component Value Date/Time    CHOL 176 12/27/2022 02:29 PM    HDL 45 12/27/2022 02:29 PM     No results found for: \"VITD3\", \"VD3RIA\"    Lab Results   Component Value Date/Time    TSH 2.86 12/27/2022 02:29 PM

## 2023-12-05 RX ORDER — CALCIUM CITRATE/VITAMIN D3 200MG-6.25
TABLET ORAL
Qty: 100 STRIP | Refills: 0 | Status: SHIPPED | OUTPATIENT
Start: 2023-12-05

## 2023-12-06 NOTE — TELEPHONE ENCOUNTER
Call patient.  I refilled their medication but they are due to be seen in the office.  Reason:  follow up of diabetes

## 2024-02-17 ENCOUNTER — APPOINTMENT (OUTPATIENT)
Facility: HOSPITAL | Age: 69
End: 2024-02-17
Payer: MEDICARE

## 2024-02-17 ENCOUNTER — HOSPITAL ENCOUNTER (EMERGENCY)
Facility: HOSPITAL | Age: 69
Discharge: HOME | End: 2024-02-17
Attending: STUDENT IN AN ORGANIZED HEALTH CARE EDUCATION/TRAINING PROGRAM
Payer: MEDICARE

## 2024-02-17 VITALS
RESPIRATION RATE: 18 BRPM | BODY MASS INDEX: 39.27 KG/M2 | WEIGHT: 230 LBS | OXYGEN SATURATION: 96 % | HEIGHT: 64 IN | HEART RATE: 96 BPM | TEMPERATURE: 97.8 F | SYSTOLIC BLOOD PRESSURE: 131 MMHG | DIASTOLIC BLOOD PRESSURE: 81 MMHG

## 2024-02-17 DIAGNOSIS — R10.84 GENERALIZED ABDOMINAL PAIN: Primary | ICD-10-CM

## 2024-02-17 DIAGNOSIS — R19.7 DIARRHEA, UNSPECIFIED TYPE: ICD-10-CM

## 2024-02-17 LAB
ALBUMIN SERPL-MCNC: 3.9 G/DL (ref 3.5–5.2)
ALBUMIN/GLOB SERPL: 0.7 (ref 1.1–2.2)
ALP SERPL-CCNC: 106 U/L (ref 35–104)
ALT SERPL-CCNC: 16 U/L (ref 10–35)
ANION GAP SERPL CALC-SCNC: 16 MMOL/L (ref 5–15)
APPEARANCE UR: CLEAR
AST SERPL-CCNC: 34 U/L (ref 10–35)
BACTERIA URNS QL MICRO: ABNORMAL /HPF
BASE EXCESS BLD CALC-SCNC: 1.2 MMOL/L
BASOPHILS # BLD: 0 K/UL (ref 0–1)
BASOPHILS NFR BLD: 0 % (ref 0–1)
BILIRUB SERPL-MCNC: 0.5 MG/DL (ref 0.2–1)
BILIRUB UR QL: NEGATIVE
BUN SERPL-MCNC: 20 MG/DL (ref 8–23)
BUN/CREAT SERPL: 25 (ref 12–20)
CALCIUM SERPL-MCNC: 9.1 MG/DL (ref 8.8–10.2)
CHLORIDE SERPL-SCNC: 93 MMOL/L (ref 98–107)
CO2 SERPL-SCNC: 23 MMOL/L (ref 22–29)
COLOR UR: ABNORMAL
CREAT SERPL-MCNC: 0.79 MG/DL (ref 0.5–0.9)
DIFFERENTIAL METHOD BLD: ABNORMAL
EOSINOPHIL # BLD: 0 K/UL (ref 0–0.4)
EOSINOPHIL NFR BLD: 0 %
EPITH CASTS URNS QL MICRO: ABNORMAL /LPF
ERYTHROCYTE [DISTWIDTH] IN BLOOD BY AUTOMATED COUNT: 14.3 % (ref 11.5–14.5)
FLUAV RNA SPEC QL NAA+PROBE: NOT DETECTED
FLUBV RNA SPEC QL NAA+PROBE: NOT DETECTED
GLOBULIN SER CALC-MCNC: 5.9 G/DL (ref 2–4)
GLUCOSE SERPL-MCNC: 138 MG/DL (ref 65–100)
GLUCOSE UR STRIP.AUTO-MCNC: NEGATIVE MG/DL
HCO3 BLD-SCNC: 25.1 MMOL/L (ref 22–26)
HCT VFR BLD AUTO: 44.5 % (ref 35–47)
HGB BLD-MCNC: 14.7 G/DL (ref 11.5–16)
HGB UR QL STRIP: NEGATIVE
IMM GRANULOCYTES # BLD AUTO: 0 K/UL (ref 0–0.04)
IMM GRANULOCYTES NFR BLD AUTO: 0 % (ref 0–0.5)
KETONES UR QL STRIP.AUTO: NEGATIVE MG/DL
LACTATE BLD-SCNC: 1.51 MMOL/L (ref 0.4–2)
LEUKOCYTE ESTERASE UR QL STRIP.AUTO: NEGATIVE
LIPASE SERPL-CCNC: 17 U/L (ref 13–60)
LYMPHOCYTES # BLD: 1.4 K/UL (ref 0.8–3.5)
LYMPHOCYTES NFR BLD: 14 % (ref 12–49)
MCH RBC QN AUTO: 29.6 PG (ref 26–34)
MCHC RBC AUTO-ENTMCNC: 33 G/DL (ref 30–36.5)
MCV RBC AUTO: 89.7 FL (ref 80–99)
MONOCYTES # BLD: 0.7 K/UL (ref 0–1)
MONOCYTES NFR BLD: 7 % (ref 5–13)
NEUTS SEG # BLD: 7.9 K/UL (ref 1.8–8)
NEUTS SEG NFR BLD: 79 % (ref 32–75)
NITRITE UR QL STRIP.AUTO: POSITIVE
NRBC # BLD: 0 K/UL (ref 0–0.01)
NRBC BLD-RTO: 0 PER 100 WBC
PCO2 BLD: 37.2 MMHG (ref 35–45)
PH BLD: 7.44 (ref 7.35–7.45)
PH UR STRIP: 5.5 (ref 5–8)
PLATELET # BLD AUTO: 256 K/UL (ref 150–400)
PMV BLD AUTO: 10.4 FL (ref 8.9–12.9)
PO2 BLD: 28 MMHG (ref 80–100)
POTASSIUM SERPL-SCNC: 3.8 MMOL/L (ref 3.5–5.1)
PROT SERPL-MCNC: 9.8 G/DL (ref 6.4–8.3)
PROT UR STRIP-MCNC: NEGATIVE MG/DL
RBC # BLD AUTO: 4.96 M/UL (ref 3.8–5.2)
RBC #/AREA URNS HPF: ABNORMAL /HPF
SAO2 % BLD: 55.7 % (ref 92–97)
SARS-COV-2 RNA RESP QL NAA+PROBE: NOT DETECTED
SERVICE CMNT-IMP: ABNORMAL
SODIUM SERPL-SCNC: 132 MMOL/L (ref 136–145)
SP GR UR REFRACTOMETRY: <1.005 (ref 1–1.03)
SPECIMEN HOLD: NORMAL
SPECIMEN TYPE: ABNORMAL
UROBILINOGEN UR QL STRIP.AUTO: 0.2 EU/DL (ref 0.2–1)
WBC # BLD AUTO: 10.1 K/UL (ref 3.6–11)
WBC URNS QL MICRO: ABNORMAL /HPF (ref 0–4)

## 2024-02-17 PROCEDURE — 81001 URINALYSIS AUTO W/SCOPE: CPT

## 2024-02-17 PROCEDURE — 74177 CT ABD & PELVIS W/CONTRAST: CPT

## 2024-02-17 PROCEDURE — 82803 BLOOD GASES ANY COMBINATION: CPT

## 2024-02-17 PROCEDURE — 83605 ASSAY OF LACTIC ACID: CPT

## 2024-02-17 PROCEDURE — 99285 EMERGENCY DEPT VISIT HI MDM: CPT

## 2024-02-17 PROCEDURE — 6360000004 HC RX CONTRAST MEDICATION: Performed by: STUDENT IN AN ORGANIZED HEALTH CARE EDUCATION/TRAINING PROGRAM

## 2024-02-17 PROCEDURE — 93005 ELECTROCARDIOGRAM TRACING: CPT | Performed by: STUDENT IN AN ORGANIZED HEALTH CARE EDUCATION/TRAINING PROGRAM

## 2024-02-17 PROCEDURE — 71045 X-RAY EXAM CHEST 1 VIEW: CPT

## 2024-02-17 PROCEDURE — 83690 ASSAY OF LIPASE: CPT

## 2024-02-17 PROCEDURE — 87636 SARSCOV2 & INF A&B AMP PRB: CPT

## 2024-02-17 PROCEDURE — 80053 COMPREHEN METABOLIC PANEL: CPT

## 2024-02-17 PROCEDURE — 96374 THER/PROPH/DIAG INJ IV PUSH: CPT

## 2024-02-17 PROCEDURE — 96375 TX/PRO/DX INJ NEW DRUG ADDON: CPT

## 2024-02-17 PROCEDURE — 36415 COLL VENOUS BLD VENIPUNCTURE: CPT

## 2024-02-17 PROCEDURE — 6360000002 HC RX W HCPCS: Performed by: STUDENT IN AN ORGANIZED HEALTH CARE EDUCATION/TRAINING PROGRAM

## 2024-02-17 PROCEDURE — 2580000003 HC RX 258: Performed by: STUDENT IN AN ORGANIZED HEALTH CARE EDUCATION/TRAINING PROGRAM

## 2024-02-17 PROCEDURE — 85025 COMPLETE CBC W/AUTO DIFF WBC: CPT

## 2024-02-17 RX ORDER — MORPHINE SULFATE 4 MG/ML
4 INJECTION, SOLUTION INTRAMUSCULAR; INTRAVENOUS
Status: COMPLETED | OUTPATIENT
Start: 2024-02-17 | End: 2024-02-17

## 2024-02-17 RX ORDER — ONDANSETRON 2 MG/ML
4 INJECTION INTRAMUSCULAR; INTRAVENOUS
Status: COMPLETED | OUTPATIENT
Start: 2024-02-17 | End: 2024-02-17

## 2024-02-17 RX ORDER — SODIUM CHLORIDE, SODIUM LACTATE, POTASSIUM CHLORIDE, AND CALCIUM CHLORIDE .6; .31; .03; .02 G/100ML; G/100ML; G/100ML; G/100ML
1000 INJECTION, SOLUTION INTRAVENOUS
Status: COMPLETED | OUTPATIENT
Start: 2024-02-17 | End: 2024-02-17

## 2024-02-17 RX ADMIN — ONDANSETRON 4 MG: 2 INJECTION INTRAMUSCULAR; INTRAVENOUS at 13:01

## 2024-02-17 RX ADMIN — MORPHINE SULFATE 4 MG: 4 INJECTION INTRAVENOUS at 13:40

## 2024-02-17 RX ADMIN — IOPAMIDOL 100 ML: 755 INJECTION, SOLUTION INTRAVENOUS at 13:28

## 2024-02-17 RX ADMIN — SODIUM CHLORIDE, POTASSIUM CHLORIDE, SODIUM LACTATE AND CALCIUM CHLORIDE 1000 ML: 600; 310; 30; 20 INJECTION, SOLUTION INTRAVENOUS at 13:40

## 2024-02-17 ASSESSMENT — PAIN SCALES - GENERAL: PAINLEVEL_OUTOF10: 6

## 2024-02-17 ASSESSMENT — LIFESTYLE VARIABLES
HOW MANY STANDARD DRINKS CONTAINING ALCOHOL DO YOU HAVE ON A TYPICAL DAY: PATIENT DOES NOT DRINK
HOW OFTEN DO YOU HAVE A DRINK CONTAINING ALCOHOL: NEVER

## 2024-02-17 ASSESSMENT — PAIN DESCRIPTION - DESCRIPTORS: DESCRIPTORS: ACHING

## 2024-02-17 ASSESSMENT — PAIN DESCRIPTION - LOCATION: LOCATION: ABDOMEN

## 2024-02-17 NOTE — ED PROVIDER NOTES
[facility]PT  EMERGENCY DEPARTMENT ENCOUNTER      Pt Name: Francoise Guerra  MRN: [id number]  Birthdate 1955  Date of evaluation: 2/17/2024  Provider: Zahida Nelson DO    CHIEF COMPLAINT       Chief Complaint   Patient presents with    Abdominal Pain       PMH   Past Medical History:   Diagnosis Date    Anxiety     Arrhythmia     Arthritis     RA    Arthritis of knee 1/30/2012    Autoimmune disease (HCC)     RA    Bronchitis     Chronic pain     due to RA    Depression     Dyslipidemia     Morbid obesity (HCC)     Nausea & vomiting     Other screening mammogram 8/19/15    Mammogram in 1 year    Overweight(278.02)     Papanicolaou smear for cervical cancer screening 8/14/15    neg; HPV neg    Rheumatoid aortitis     SVT (supraventricular tachycardia) (HCC) 1993    hx of. underwent cardiac ablation          MDM:   Vitals:    Vitals:    02/17/24 1221   BP: 131/81   Pulse: 96   Resp: 18   Temp: 97.8 °F (36.6 °C)   SpO2: 96%           This is a 68 y.o. female with pmhx HTN, HLD, T2DM, hypothyroidism, psychiatric disorders  who presents today for cc of abdominal pain.  Patient states over the last 3 days she has had lower abdominal cramping with nausea, vomiting, diarrhea.  No fever or chills, no chest pain or dyspnea.  Last episode emesis was this morning, nonbilious nonbloody.  She states that the frequency of the diarrhea went from a few times per day to once a day, just loose stools not black/tarry/bloody.  She states that lower abdominal cramping has persisted, and a 6 out of 10 the pain scale, nonradiating.  She has no sick contacts.    On arrival VS stable.   Physical Exam  General: Alert, no acute distress  HEENT: Normocephalic, atraumatic. EOMI, dry oral mucosa, no conjunctival injection  Neck: ROM normal, supple  Cardio: Heart regular rate and rhythm, cap refill <2seconds  Lungs: No respiratory distress, no wheezing, CTAB  Abdomen: Soft,  diffusely tender without rebound or guarding.  MSK:  5.9 (*)     Albumin/Globulin Ratio 0.7 (*)     All other components within normal limits   CBC WITH AUTO DIFFERENTIAL - Abnormal; Notable for the following components:    Neutrophils % 79 (*)     Eosinophils % 0 (*)     All other components within normal limits   URINALYSIS WITH MICROSCOPIC - Abnormal; Notable for the following components:    Nitrite, Urine Positive (*)     BACTERIA, URINE 1+ (*)     All other components within normal limits   POC CG4:BLOOD GAS,LACTATE - Abnormal; Notable for the following components:    POC PO2 28 (*)     POC O2 SAT 55.7 (*)     All other components within normal limits   COVID-19 & INFLUENZA COMBO   URINE CULTURE HOLD SAMPLE   LIPASE   POCT LACTIC ACID       All other labs were within normal range or not returned as of this dictation.        PROCEDURES:  Unless otherwise noted below, none     Procedures    See MDM for documentation of critical care time.       FINAL IMPRESSION      1. Generalized abdominal pain    2. Diarrhea, unspecified type          DISPOSITION/PLAN   DISPOSITION Decision To Discharge 02/17/2024 03:11:54 PM      PATIENT REFERRED TO:  Juliette Leung MD  [address] [address] [phone]    Call in 1 day  Regarding your ER visit today    Emergency Department    Go to   If symptoms worsen      DISCHARGE MEDICATIONS:  New Prescriptions    No medications on file         (Please note that portions of this note were completed with a voice recognition program.  Efforts were made to edit the dictations but occasionally words are mis-transcribed.)    Zahida Nelson DO (electronically signed)  Emergency Attending Physician / Physician Assistant / Nurse Practitioner             Zahida Nelson DO  02/17/24 1310

## 2024-02-17 NOTE — ED TRIAGE NOTES
Patient arrived via EMS from home with c/c of N/V abdominal pain for the past 3 days has not been eating or drinking along with watery diarrhea

## 2024-02-18 LAB
EKG ATRIAL RATE: 95 BPM
EKG DIAGNOSIS: NORMAL
EKG P AXIS: 58 DEGREES
EKG P-R INTERVAL: 150 MS
EKG Q-T INTERVAL: 360 MS
EKG QRS DURATION: 80 MS
EKG QTC CALCULATION (BAZETT): 452 MS
EKG R AXIS: 60 DEGREES
EKG T AXIS: 48 DEGREES
EKG VENTRICULAR RATE: 95 BPM

## 2024-02-18 PROCEDURE — 93010 ELECTROCARDIOGRAM REPORT: CPT | Performed by: INTERNAL MEDICINE

## 2024-02-19 RX ORDER — GLUCOSAM/CHON-MSM1/C/MANG/BOSW 500-416.6
TABLET ORAL
Qty: 100 EACH | Refills: 3 | OUTPATIENT
Start: 2024-02-19

## 2024-02-19 RX ORDER — CALCIUM CITRATE/VITAMIN D3 200MG-6.25
TABLET ORAL
Qty: 100 STRIP | Refills: 3 | OUTPATIENT
Start: 2024-02-19

## 2024-02-19 RX ORDER — CALCIUM CITRATE/VITAMIN D3 200MG-6.25
TABLET ORAL
Qty: 100 STRIP | Refills: 3 | Status: SHIPPED | OUTPATIENT
Start: 2024-02-19

## 2024-02-19 RX ORDER — CARVEDILOL 25 MG/1
TABLET ORAL
Qty: 180 TABLET | Refills: 1 | Status: SHIPPED | OUTPATIENT
Start: 2024-02-19

## 2024-02-19 RX ORDER — GLUCOSAM/CHON-MSM1/C/MANG/BOSW 500-416.6
TABLET ORAL
Qty: 100 EACH | Refills: 3 | Status: SHIPPED | OUTPATIENT
Start: 2024-02-19

## 2024-02-19 RX ORDER — ATORVASTATIN CALCIUM 40 MG/1
TABLET, FILM COATED ORAL
Qty: 90 TABLET | Refills: 1 | Status: SHIPPED | OUTPATIENT
Start: 2024-02-19

## 2024-02-19 NOTE — TELEPHONE ENCOUNTER
PCP: Juliette Leung MD    Last appt: [unfilled]  No future appointments.    Requested Prescriptions     Pending Prescriptions Disp Refills    carvedilol (COREG) 25 MG tablet [Pharmacy Med Name: CARVEDILOL 25 MG Tablet] 180 tablet 3     Sig: TAKE 1 TABLET TWICE DAILY    atorvastatin (LIPITOR) 40 MG tablet [Pharmacy Med Name: ATORVASTATIN CALCIUM 40 MG Tablet] 90 tablet 3     Sig: TAKE 1 TABLET EVERY DAY    blood glucose test strips (TRUE METRIX BLOOD GLUCOSE TEST) strip 100 strip 0     Sig: CHECK BLOOD SUGAR ONCE DAILY    TRUEplus Lancets 33G MISC 100 each 0     Sig: USE TO CHECK BLOOD SUGAR DAILY       Prior labs and Blood pressures:  BP Readings from Last 3 Encounters:   02/17/24 131/81   07/01/21 116/71   05/17/21 (!) 102/55     Lab Results   Component Value Date/Time     02/17/2024 12:34 PM    K 3.8 02/17/2024 12:34 PM    CL 93 02/17/2024 12:34 PM    CO2 23 02/17/2024 12:34 PM    BUN 20 02/17/2024 12:34 PM    GFRAA >60 03/07/2022 01:40 PM     No results found for: \"HBA1C\", \"[id number]\"  Lab Results   Component Value Date/Time    CHOL 176 12/27/2022 02:29 PM    HDL 45 12/27/2022 02:29 PM     No results found for: \"VITD3\", \"VD3RIA\"    Lab Results   Component Value Date/Time    TSH 2.86 12/27/2022 02:29 PM

## 2024-05-20 NOTE — PROGRESS NOTES
[facility] and [address] at [facility] at [address] [facility], [address] [place] [address] [address]  [phone] Office  [phone] Facsimile           Referring: SHANIA Mayen    Chief Complaint   Patient presents with    New Patient    Peripheral Neuropathy     feet, x3yrs     78-year-old lady presents for initial neurologic consultation regarding pain and numbness in her feet. She notes that she has been diagnosed on a clinical basis with neuropathy. No EMG. She does have diabetes. Sugars have been well controlled. Her symptoms in her feet have been ongoing for about 4 years. She feels numbness in the thickness. She notes that when she takes the gabapentin the discomfort is controlled. She tolerates that well. She uses 800 mg 3 times daily. No side effects. She has been in a wheelchair secondary to significant hip replacement surgery on the right. Trying to be more active and walk more but the discomfort in her feet when she does not have the gabapentin makes that difficult. Record review finds office visit with nurse practitioner Keenan Mayen dated May 1721 where the patient came in for follow-up diabetes hypertension dyslipidemia. She was requesting referral to a neurologist closer to home and also rheumatology referral.  She was on gabapentin.   Laboratory analysis May 17, 2892  Metabolic panel largely unremarkable  Vitamin D low at 19  TSH normal    Labs from September 4, 2020  Hemoglobin A1c 5.5  Past Medical History:   Diagnosis Date    Anxiety     Arrhythmia     Arthritis     RA    Arthritis of knee 1/30/2012    Autoimmune disease (HCC)     RA    Bronchitis     Chronic pain     due to RA    Depression     Dyslipidemia     Morbid obesity (HCC)     Nausea & vomiting     Other screening mammogram 8/19/15    Mammogram in 1 year    Overweight(278.02)     Papanicolaou smear for cervical cancer screening 8/14/15    neg; HPV neg    Rheumatoid aortitis     SVT (supraventricular tachycardia) (HCC) 1993    hx of. underwent cardiac ablation        Past Surgical History:   Procedure Laterality Date    HX GYN      BTL    HX HEENT  6-30-14    root canal    HX HIP REPLACEMENT      2006, 2009    HX HIP REPLACEMENT Right     HX KNEE REPLACEMENT      2012, Dr. Peri Wilson      2 rods and 4 bolts Dr. Ashvin Weber  7/2014    HX ORTHOPAEDIC  2005 2009    bilateral total hip replacement    HX ORTHOPAEDIC      left toe    HX ORTHOPAEDIC  2012    bilateral knee replacements    HX ORTHOPAEDIC  2013    left elbow x 5    HX TONSILLECTOMY      DC BREAST SURGERY PROCEDURE UNLISTED      right breast mass excision    DC CARDIAC SURG PROCEDURE UNLIST  1993    cardiac ablation for SVT       Current Outpatient Medications   Medication Sig Dispense Refill    gabapentin (NEURONTIN) 800 mg tablet TAKE 1 TABLET BY MOUTH THREE TIMES DAILY, MAXIMUM DAILY DOSE IS 3 TABLETS 90 Tablet 0    buPROPion XL (WELLBUTRIN XL) 300 mg XL tablet TAKE 1 TABLET BY MOUTH EVERY MORNING 30 Tablet 1    carvediloL (COREG) 25 mg tablet Take 1 Tablet by mouth two (2) times daily (with meals). Indications: high blood pressure 60 Tablet 3    ergocalciferol (ERGOCALCIFEROL) 1,250 mcg (50,000 unit) capsule Take 1 Cap by mouth every seven (7) days. 8 Cap 0    glucose blood VI test strips (FreeStyle Test) strip CHECK BLOOD SUGAR DAILY E11.8 100 Strip 3    metFORMIN (GLUCOPHAGE) 500 mg tablet TAKE 1 TABLET BY MOUTH TWICE DAILY WITH MEALS 60 Tab 0    levothyroxine (SYNTHROID) 125 mcg tablet Take 1 Tab by mouth Daily (before breakfast). 30 Tab 2    fluticasone propionate (FLONASE) 50 mcg/actuation nasal spray SHAKE LQ AND U 1 SPR IEN QD FOR 10 DAYS UTD  0    mirtazapine (REMERON) 15 mg tablet TK 1 T PO QD BEFORE BEDTIME  2    methotrexate (TREXALL) 10 mg tablet Take 2 Tabs by mouth Every Saturday.  29 Tab 0    atorvastatin (LIPITOR) 40 mg tablet TAKE 1 TABLET BY MOUTH DAILY 30 Tab 0    cyanocobalamin (VITAMIN B-12) 500 mcg tablet Take 250 mcg by mouth daily.  abatacept (ORENCIA) 125 mg/mL syrg 1 mL by SubCUTAneous route every seven (7) days. 4 Syringe 11    folic acid (FOLVITE) 1 mg tablet TAKE 1 TABLET BY MOUTH DAILY 90 Tab 0    lancets (FREESTYLE LANCETS) 28 gauge misc CHECK BLOOD SUGAR ONCE DAILY 100 Lancet 3    HYDROcodone-acetaminophen (NORCO) 5-325 mg per tablet TK 1 T PO  UP TO tid PRN  0    Blood-Glucose Meter (BLOOD GLUCOSE MONITORING) monitoring kit E11.8  To check blood sugar daily 1 Kit 0        No Known Allergies    Social History     Tobacco Use    Smoking status: Former Smoker     Packs/day: 0.50     Years: 20.00     Pack years: 10.00     Quit date: 2013     Years since quittin.5    Smokeless tobacco: Never Used   Substance Use Topics    Alcohol use: No     Alcohol/week: 0.0 standard drinks    Drug use: No       Family History   Problem Relation Age of Onset    Diabetes Brother     Heart Disease Brother     Diabetes Mother     Heart Disease Father     Breast Cancer Other         Paternal [employer] skilled nursing Aunt       Review of Systems  Pertinent positives and negatives as noted. Examination  Visit Vitals  Ht 5' 4\" (1.626 m)   Wt 104.3 kg (230 lb)   BMI 39.48 kg/m²     Visit Vitals  /71 (BP 1 Location: Right arm, BP Patient Position: Sitting, BP Cuff Size: Large adult)   Pulse (!) 101   Resp 18   Ht 5' 4\" (1.626 m)   Wt 104.3 kg (230 lb)   SpO2 92%   BMI 39.48 kg/m²     She is awake alert oriented friendly conversant and interactive. Very nice lady. No icterus. No edema. Arthritic changes in the extremities. Normal speech and language. Normal cognition. Intact cranial nerves without nystagmus. No pronation drift or abnormal movement. Strength is full throughout. Reflexes are absent at the ankles and trace at the knees bilaterally and symmetrically.   No pathologic reflexes. Graded loss to pinprick to mid dorsum of the feet symmetrically. Impression/Plan  25-year-old lady with diabetes and symptoms that are suggestive of peripheral neuropathy although she has not had a EMG for formal diagnosis and to exclude other etiologies  EMG of the bilateral lower extremities  Neurontin refilled  Follow-up after    Yoel Glover MD          This note was created using voice recognition software. Despite editing, there may be syntax errors. 71 y/o female H/O vertigo, DM2, HTN C/C  she felt dizzy, light headed x few hours, was seen in urgent care and referred to the ED for work up. In the ED she is asymptomatic. no headache, no chest pain, no SOB, no palpitations, no syncope, no fever, no chills,  no n/v/d, no urinary symptoms, no bleeding. no neuro changes.

## 2024-07-15 RX ORDER — CARVEDILOL 25 MG/1
TABLET ORAL
Qty: 180 TABLET | Refills: 3 | OUTPATIENT
Start: 2024-07-15

## 2024-12-11 RX ORDER — CALCIUM CITRATE/VITAMIN D3 200MG-6.25
TABLET ORAL
Qty: 100 STRIP | Refills: 3 | OUTPATIENT
Start: 2024-12-11

## 2024-12-11 RX ORDER — GLUCOSAM/CHON-MSM1/C/MANG/BOSW 500-416.6
TABLET ORAL
Qty: 100 EACH | Refills: 3 | OUTPATIENT
Start: 2024-12-11

## 2025-01-20 NOTE — TELEPHONE ENCOUNTER
Medication: metoprolol succinate 100 mg daily  Last office visit date: 8/14/2024 with NEIL Calvert  Medication Refill Protocol Failed.  Protocol approved due to: data identified in chart review.       Beta Blocker Refill Protocol - 12 Month Protocol Juvgel5301/19/2025 03:12 PM   Protocol Details Seen by prescribing provider or same department within the last 12 months or has a future appt in 3 months - IF FAILED PLEASE LOOK AT CHART REVIEW FOR LAST VISIT AND PROCEED ACCORDINGLY    Last recorded pulse is greater than 49    Medication (including dose and sig) on current meds list    Request is NOT for Sotalol HCL    Current med list does not contain more than one Beta Blocker medication    Last BP was equal to or less than 150/100 -- IF CRITERIA FAILED REFER TO PROTOCOL DETAILS      Spoke with pt and advised of MD request, then contact our office back with that info and we will enter referral. Pt verbalized understanding and no further questions.